# Patient Record
Sex: MALE | Race: WHITE | Employment: UNEMPLOYED | URBAN - METROPOLITAN AREA
[De-identification: names, ages, dates, MRNs, and addresses within clinical notes are randomized per-mention and may not be internally consistent; named-entity substitution may affect disease eponyms.]

---

## 2017-01-28 DIAGNOSIS — E78.5 HYPERLIPIDEMIA LDL GOAL <70: Primary | ICD-10-CM

## 2017-01-28 NOTE — TELEPHONE ENCOUNTER
Atorvastatin    80  Last Written Prescription Date: 04/04/16  Last Fill Quantity: 90, # refills: 2  Last Office Visit with [facility], [facility] or [facility] prescribing provider: 06/11/15   Next 5 appointments (look out 90 days)     Feb 14, 2017  3:30 PM   Return Visit with Seth Gurrola MD   [facility] PHYSICIAN HEART AT [facility] ([facility])    [address] [address]   [phone]                   CHOL      173   12/1/2014  HDL       42   12/1/2014  LDL      100   12/1/2014  TRIG      155   12/1/2014  CHOLHDLRATIO      4.1   12/1/2014      Thank You!  Amy Benavidez  [facility]  P: [phone] F:[phone]

## 2017-01-31 RX ORDER — ATORVASTATIN CALCIUM 80 MG/1
80 TABLET, FILM COATED ORAL DAILY
Qty: 90 TABLET | Refills: 0 | Status: SHIPPED | OUTPATIENT
Start: 2017-01-31 | End: 2017-05-01

## 2017-02-28 DIAGNOSIS — I47.10 PAROXYSMAL SUPRAVENTRICULAR TACHYCARDIA (H): ICD-10-CM

## 2017-02-28 NOTE — TELEPHONE ENCOUNTER
Metoprolol er 25mg      Last Written Prescription Date: 5/27/16  Last Fill Quantity: 90, # refills: 2    Last Office Visit with [facility] or  Health prescribing provider:  6/11/15   Future Office Visit:    Next 5 appointments (look out 90 days)     Mar 13, 2017  3:00 PM CDT   Return Visit with Alvaro Caballero MD   [facility] PHYSICIAN HEART AT [facility] ([facility])    [address] [place] [address]   [phone]                    BP Readings from Last 3 Encounters:   12/08/16 110/68   12/10/15 125/85   11/09/15 115/63       Aspirin 81mg chew      Last Written Prescription Date: 12/21/16  Last Fill Quantity: 90,  # refills: 0   Last Office Visit with [facility], [facility] or  Health prescribing provider: 6/11/15                                         Next 5 appointments (look out 90 days)     Mar 13, 2017  3:00 PM CDT   Return Visit with Alvaro Caballero MD   [facility] PHYSICIAN HEART AT [facility] ([facility])    [address] [place] [address]   [phone]                    Maria Elena Duncan [facility]

## 2017-03-01 RX ORDER — ASPIRIN 81 MG/1
81 TABLET, CHEWABLE ORAL DAILY
Qty: 30 TABLET | Refills: 0 | Status: SHIPPED | OUTPATIENT
Start: 2017-03-01 | End: 2017-03-31

## 2017-03-01 RX ORDER — METOPROLOL SUCCINATE 25 MG/1
25 TABLET, EXTENDED RELEASE ORAL DAILY
Qty: 30 TABLET | Refills: 0 | Status: SHIPPED | OUTPATIENT
Start: 2017-03-01 | End: 2017-03-31

## 2017-03-13 ENCOUNTER — OFFICE VISIT (OUTPATIENT)
Dept: CARDIOLOGY | Facility: CLINIC | Age: 62
End: 2017-03-13
Attending: INTERNAL MEDICINE
Payer: COMMERCIAL

## 2017-03-13 VITALS
BODY MASS INDEX: 26.96 KG/M2 | HEART RATE: 76 BPM | DIASTOLIC BLOOD PRESSURE: 57 MMHG | OXYGEN SATURATION: 96 % | WEIGHT: 162 LBS | SYSTOLIC BLOOD PRESSURE: 104 MMHG

## 2017-03-13 DIAGNOSIS — E78.2 MIXED HYPERLIPIDEMIA: ICD-10-CM

## 2017-03-13 DIAGNOSIS — I25.10 CORONARY ARTERY DISEASE INVOLVING NATIVE HEART WITHOUT ANGINA PECTORIS, UNSPECIFIED VESSEL OR LESION TYPE: Primary | ICD-10-CM

## 2017-03-13 LAB
ALBUMIN SERPL-MCNC: 3.6 G/DL (ref 3.4–5)
ALP SERPL-CCNC: 57 U/L (ref 40–150)
ALT SERPL W P-5'-P-CCNC: 91 U/L (ref 0–70)
ANION GAP SERPL CALCULATED.3IONS-SCNC: 4 MMOL/L (ref 3–14)
AST SERPL W P-5'-P-CCNC: 43 U/L (ref 0–45)
BILIRUB SERPL-MCNC: 0.8 MG/DL (ref 0.2–1.3)
BUN SERPL-MCNC: 20 MG/DL (ref 7–30)
CALCIUM SERPL-MCNC: 8.4 MG/DL (ref 8.5–10.1)
CHLORIDE SERPL-SCNC: 103 MMOL/L (ref 94–109)
CHOLEST SERPL-MCNC: 141 MG/DL
CO2 SERPL-SCNC: 30 MMOL/L (ref 20–32)
CREAT SERPL-MCNC: 0.84 MG/DL (ref 0.66–1.25)
ERYTHROCYTE [DISTWIDTH] IN BLOOD BY AUTOMATED COUNT: 13.2 % (ref 10–15)
GFR SERPL CREATININE-BSD FRML MDRD: ABNORMAL ML/MIN/1.7M2
GLUCOSE SERPL-MCNC: 91 MG/DL (ref 70–99)
HBA1C MFR BLD: 5.8 % (ref 4.3–6)
HCT VFR BLD AUTO: 44.4 % (ref 40–53)
HDLC SERPL-MCNC: 44 MG/DL
HGB BLD-MCNC: 15.1 G/DL (ref 13.3–17.7)
LDLC SERPL CALC-MCNC: 64 MG/DL
MCH RBC QN AUTO: 27.8 PG (ref 26.5–33)
MCHC RBC AUTO-ENTMCNC: 34 G/DL (ref 31.5–36.5)
MCV RBC AUTO: 82 FL (ref 78–100)
NONHDLC SERPL-MCNC: 97 MG/DL
PLATELET # BLD AUTO: 213 10E9/L (ref 150–450)
POTASSIUM SERPL-SCNC: 4.7 MMOL/L (ref 3.4–5.3)
PROT SERPL-MCNC: 6.9 G/DL (ref 6.8–8.8)
RBC # BLD AUTO: 5.43 10E12/L (ref 4.4–5.9)
SODIUM SERPL-SCNC: 137 MMOL/L (ref 133–144)
TRIGL SERPL-MCNC: 164 MG/DL
WBC # BLD AUTO: 7 10E9/L (ref 4–11)

## 2017-03-13 PROCEDURE — 80053 COMPREHEN METABOLIC PANEL: CPT | Performed by: INTERNAL MEDICINE

## 2017-03-13 PROCEDURE — 99214 OFFICE O/P EST MOD 30 MIN: CPT | Performed by: INTERNAL MEDICINE

## 2017-03-13 PROCEDURE — 85027 COMPLETE CBC AUTOMATED: CPT | Performed by: INTERNAL MEDICINE

## 2017-03-13 PROCEDURE — 83036 HEMOGLOBIN GLYCOSYLATED A1C: CPT | Performed by: INTERNAL MEDICINE

## 2017-03-13 PROCEDURE — 36415 COLL VENOUS BLD VENIPUNCTURE: CPT | Performed by: INTERNAL MEDICINE

## 2017-03-13 PROCEDURE — 80061 LIPID PANEL: CPT | Performed by: INTERNAL MEDICINE

## 2017-03-13 NOTE — PROGRESS NOTES
CARDIOLOGY VISIT    REASON FOR VISIT: f/u CAD, SVT    SUBJECTIVE:  61-year-old male seen for follow-up of SVT and coronary artery disease.  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  He had a postop inferior STEMI with subsequent drug-eluting stent to the RCA and then staged intervention of the OM 2.  He was also noted to have atrial tachycardia originating near the his node and no ablation was pursued.  He has been on beta blocker therapy since then.     Echocardiogram April 2014 showed ejection fraction 50-55% with mild inferior and inferolateral hypokinesis, normal right ventricle, no significant valve disease. Exercise nuclear stress test November 2015 showed 12 minutes 30 seconds on the Roderick protocol, 13 mets, 99% maximum heart rate achieved, no chest pain, small fixed inferior wall defect  consistent with infarct, no ischemia, ejection fraction 51% with mild inferior hypokinesis.     Since last visit he has been doing well.  He has rare palpitations lasting a few seconds, but nothing sustained.  He will work out on an elliptical machine for 20-30 minutes, peak heart rate in the 130s.  He denies any exertional chest pain or shortness of breath.  He thinks resting heart rate runs in the 60s to 70s.    MEDICATIONS:  Current Outpatient Prescriptions   Medication     metoprolol (TOPROL-XL) 25 MG 24 hr tablet     aspirin 81 MG chewable tablet     atorvastatin (LIPITOR) 80 MG tablet     acetaminophen (TYLENOL) 325 MG tablet     nitroglycerin (NITROSTAT) 0.4 MG SL tablet     No current facility-administered medications for this visit.        ALLERGIES:  No Known Allergies    REVIEW OF SYSTEMS:  Constitutional:  No weight loss, fever, chills, weakness or fatigue.  HEENT:  Eyes:  No visual loss, blurred vision, double vision or yellow sclerae. No hearing loss, sneezing, congestion, runny nose or sore throat.  Skin:  No rash or itching.  Cardiovascular: per HPI  Respiratory: per HPI  GI:   No anorexia, nausea, vomiting or diarrhea. No abdominal pain or blood.  :  No dysurea, hematuria  Neurologic:  No headache, dizziness, syncope, paralysis, ataxia, numbness or tingling in the extremities. No change in bowel or bladder control.  Musculoskeletal:  No muscle, back pain, joint pain or stiffness.  Hematologic:  No anemia, bleeding or bruising.  Lymphatics:  No enlarged nodes. No history of splenectomy.  Psychiatric:  No history of depression or anxiety.  Endocrine:  No reports of sweating, cold or heat intolerance. No polyuria or polydipsia.  Allergies:  No history of asthma, hives, eczema or rhinitis.    PHYSICAL EXAM:      BP: 104/57 Pulse: 76     SpO2: 96 %      Vital Signs with Ranges  Pulse:  [76] 76  BP: (104)/(57) 104/57  SpO2:  [96 %] 96 %  162 lbs 0 oz    Constitutional: awake, alert, no distress  Eyes: PERRL, sclera nonicteric  ENT: trachea midline  Respiratory: Lungs clear bilaterally  Cardiovascular: Regular rate and rhythm, no murmurs  GI: nondistended, nontender, bowel sounds present  Lymph/Hematologic: no lymphadenopathy  Skin: dry, no rash  Musculoskeletal: good muscle tone, strength 5/5 in upper and lower extremities  Neurologic: no focal deficits  Neuropsychiatric: appropriate affact    ASSESSMENT:  61-year-old male seen for follow-up of SVT and coronary artery disease.  Lately he is doing well and has no concerning symptoms with a very good functional capacity.  He is due for lab work, no labs have been done in the past one year or more.  No cardiac testing otherwise is indicated.    RECOMMENDATIONS:  1.  Coronary artery disease with previous stents  - Continue current medications    2.  History of SVT ablation  - No symptomatically recurrence, he only has very brief rare palpitations    3.  Dyslipidemia  - Recheck lipids today    Patient is also due for CMP, CBC, and A1c.    Follow-up in 6 months with physician assistant.    Alvaro Caballero MD  Cardiology - [facility]  Pager:   [phone]  Text Page  March 13, 2017

## 2017-03-13 NOTE — MR AVS SNAPSHOT
After Visit Summary   3/13/2017    Jacques Sharp    MRN: [id number]           Patient Information     Date Of Birth          1955        Visit Information        Provider Department      3/13/2017 3:00 PM Alvaro Caballero MD [facility] PHYSICIAN HEART AT [facility]        Today's Diagnoses     Coronary artery disease involving native heart without angina pectoris, unspecified vessel or lesion type    -  1    Mixed hyperlipidemia           Follow-ups after your visit        Additional Services     Follow-Up with Cardiac Advanced Practice Provider                 Your next 10 appointments already scheduled     Mar 13, 2017  3:00 PM CDT   Return Visit with Alvaro Caballero MD   [facility] PHYSICIAN HEART AT [facility] ([facility])    [address] [address] [phone]              Future tests that were ordered for you today     Open Future Orders        Priority Expected Expires Ordered    Follow-Up with Cardiac Advanced Practice Provider Routine 9/9/2017 3/13/2018 3/13/2017            Who to contact     If you have questions or need follow up information about today's clinic visit or your schedule please contact [facility] PHYSICIAN HEART [facility] directly at [phone].  Normal or non-critical lab and imaging results will be communicated to you by MyChart, letter or phone within 4 business days after the clinic has received the results. If you do not hear from us within 7 days, please contact the clinic through MyChart or phone. If you have a critical or abnormal lab result, we will notify you by phone as soon as possible.  Submit refill requests through [employer] or call your pharmacy and they will forward the refill request to us. Please allow 3 business days for your refill to be completed.          Additional Information About Your Visit        MyChart Information     [employer] lets you  "send messages to your doctor, view your test results, renew your prescriptions, schedule appointments and more. To sign up, go to [email or web address] . Click on \"Log in\" on the left side of the screen, which will take you to the Welcome page. Then click on \"Sign up Now\" on the right side of the page.     You will be asked to enter the access code listed below, as well as some personal information. Please follow the directions to create your username and password.     Your access code is: [email or web address]  Expires: 2017 11:57 AM     Your access code will  in 90 days. If you need help or a new code, please call your [facility] clinic or [phone].        Care EveryWhere ID     This is your Care EveryWhere ID. This could be used by other organizations to access your [facility] medical records  [id number]        Your Vitals Were     Pulse Pulse Oximetry BMI (Body Mass Index)             76 96% 26.96 kg/m2          Blood Pressure from Last 3 Encounters:   17 104/57   16 110/68   12/10/15 125/85    Weight from Last 3 Encounters:   17 73.5 kg (162 lb)   16 68 kg (150 lb)   12/10/15 68 kg (150 lb)              We Performed the Following     CBC with platelets     Comprehensive metabolic panel     Follow-Up with Cardiologist     Hemoglobin A1c     Lipid Profile          Today's Medication Changes          These changes are accurate as of: 3/13/17 11:57 AM.  If you have any questions, ask your nurse or doctor.               Stop taking these medicines if you haven't already. Please contact your care team if you have questions.     IBUPROFEN PO   Stopped by:  Alvaro Caballero MD                    Primary Care Provider Office Phone # Fax #    Luis Alberto Mcgowan [phone]       [facility] [address] [place] [address]        Thank you!     Thank you for choosing [facility] PHYSICIAN HEART AT [facility]  for your care. Our goal " is always to provide you with excellent care. Hearing back from our patients is one way we can continue to improve our services. Please take a few minutes to complete the written survey that you may receive in the mail after your visit with us. Thank you!             Your Updated Medication List - Protect others around you: Learn how to safely use, store and throw away your medicines at www.disposemymeds.org.          This list is accurate as of: 3/13/17 11:57 AM.  Always use your most recent med list.                   Brand Name Dispense Instructions for use    acetaminophen 325 MG tablet    TYLENOL    100 tablet    Take 1-2 tablets (325-650 mg) by mouth every 4 hours as needed       aspirin 81 MG chewable tablet     30 tablet    Take 1 tablet (81 mg) by mouth daily No further refills until seen by cardiology       atorvastatin 80 MG tablet    LIPITOR    90 tablet    Take 1 tablet (80 mg) by mouth daily       metoprolol 25 MG 24 hr tablet    TOPROL-XL    30 tablet    Take 1 tablet (25 mg) by mouth daily       nitroglycerin 0.4 MG sublingual tablet    NITROSTAT    60 tablet    Place 1 tablet (0.4 mg) under the tongue every 5 minutes as needed for chest pain

## 2017-03-13 NOTE — LETTER
3/13/2017    Luis Alberto Mcgowan MD  [facility]   [address] [address]    RE: Jacques ARNOLD Sharp       Dear Colleague,    I had the pleasure of seeing Jacques BARRETT Aron in the [facility].    CARDIOLOGY VISIT    REASON FOR VISIT: f/u CAD, SVT    SUBJECTIVE:  61-year-old male seen for follow-up of SVT and coronary artery disease.  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  He had a postop inferior STEMI with subsequent drug-eluting stent to the RCA and then staged intervention of the OM 2.  He was also noted to have atrial tachycardia originating near the his node and no ablation was pursued.  He has been on beta blocker therapy since then.     Echocardiogram April 2014 showed ejection fraction 50-55% with mild inferior and inferolateral hypokinesis, normal right ventricle, no significant valve disease. Exercise nuclear stress test November 2015 showed 12 minutes 30 seconds on the Roderick protocol, 13 mets, 99% maximum heart rate achieved, no chest pain, small fixed inferior wall defect  consistent with infarct, no ischemia, ejection fraction 51% with mild inferior hypokinesis.     Since last visit he has been doing well.  He has rare palpitations lasting a few seconds, but nothing sustained.  He will work out on an elliptical machine for 20-30 minutes, peak heart rate in the 130s.  He denies any exertional chest pain or shortness of breath.  He thinks resting heart rate runs in the 60s to 70s.    MEDICATIONS:  Current Outpatient Prescriptions   Medication     metoprolol (TOPROL-XL) 25 MG 24 hr tablet     aspirin 81 MG chewable tablet     atorvastatin (LIPITOR) 80 MG tablet     acetaminophen (TYLENOL) 325 MG tablet     nitroglycerin (NITROSTAT) 0.4 MG SL tablet     No current facility-administered medications for this visit.        ALLERGIES:  No Known Allergies    REVIEW OF SYSTEMS:  Constitutional:  No weight loss, fever, chills, weakness or  fatigue.  HEENT:  Eyes:  No visual loss, blurred vision, double vision or yellow sclerae. No hearing loss, sneezing, congestion, runny nose or sore throat.  Skin:  No rash or itching.  Cardiovascular: per HPI  Respiratory: per HPI  GI:  No anorexia, nausea, vomiting or diarrhea. No abdominal pain or blood.  :  No dysurea, hematuria  Neurologic:  No headache, dizziness, syncope, paralysis, ataxia, numbness or tingling in the extremities. No change in bowel or bladder control.  Musculoskeletal:  No muscle, back pain, joint pain or stiffness.  Hematologic:  No anemia, bleeding or bruising.  Lymphatics:  No enlarged nodes. No history of splenectomy.  Psychiatric:  No history of depression or anxiety.  Endocrine:  No reports of sweating, cold or heat intolerance. No polyuria or polydipsia.  Allergies:  No history of asthma, hives, eczema or rhinitis.    PHYSICAL EXAM:      BP: 104/57 Pulse: 76     SpO2: 96 %      Vital Signs with Ranges  Pulse:  [76] 76  BP: (104)/(57) 104/57  SpO2:  [96 %] 96 %  162 lbs 0 oz    Constitutional: awake, alert, no distress  Eyes: PERRL, sclera nonicteric  ENT: trachea midline  Respiratory: Lungs clear bilaterally  Cardiovascular: Regular rate and rhythm, no murmurs  GI: nondistended, nontender, bowel sounds present  Lymph/Hematologic: no lymphadenopathy  Skin: dry, no rash  Musculoskeletal: good muscle tone, strength 5/5 in upper and lower extremities  Neurologic: no focal deficits  Neuropsychiatric: appropriate affact    ASSESSMENT:  61-year-old male seen for follow-up of SVT and coronary artery disease.  Lately he is doing well and has no concerning symptoms with a very good functional capacity.  He is due for lab work, no labs have been done in the past one year or more.  No cardiac testing otherwise is indicated.    RECOMMENDATIONS:  1.  Coronary artery disease with previous stents  - Continue current medications    2.  History of SVT ablation  - No symptomatically recurrence, he only  has very brief rare palpitations    3.  Dyslipidemia  - Recheck lipids today    Patient is also due for CMP, CBC, and A1c.    Follow-up in 6 months with physician assistant.    Alvaro Caballero MD  Cardiology - [facility]  Pager:  [phone]  Text Page  March 13, 2017    Thank you for allowing me to participate in the care of your patient.    Sincerely,     Alvaro Caballero MD     [facility]

## 2017-03-31 DIAGNOSIS — I47.10 PAROXYSMAL SUPRAVENTRICULAR TACHYCARDIA (H): ICD-10-CM

## 2017-03-31 RX ORDER — ASPIRIN 81 MG/1
81 TABLET, CHEWABLE ORAL DAILY
Qty: 90 TABLET | Refills: 3 | Status: SHIPPED | OUTPATIENT
Start: 2017-03-31 | End: 2018-02-08

## 2017-03-31 RX ORDER — ASPIRIN 81 MG/1
81 TABLET, CHEWABLE ORAL DAILY
Qty: 30 TABLET | Refills: 0 | Status: SHIPPED | OUTPATIENT
Start: 2017-03-31 | End: 2017-03-31

## 2017-03-31 RX ORDER — METOPROLOL SUCCINATE 25 MG/1
25 TABLET, EXTENDED RELEASE ORAL DAILY
Qty: 30 TABLET | Refills: 0 | Status: SHIPPED | OUTPATIENT
Start: 2017-03-31 | End: 2017-03-31

## 2017-03-31 RX ORDER — METOPROLOL SUCCINATE 25 MG/1
25 TABLET, EXTENDED RELEASE ORAL DAILY
Qty: 90 TABLET | Refills: 3 | Status: SHIPPED | OUTPATIENT
Start: 2017-03-31 | End: 2018-01-11

## 2017-03-31 NOTE — TELEPHONE ENCOUNTER
Aspirin 81mg chew      Last Written Prescription Date:  3/1/17  Last Fill Quantity: 30,   # refills: 0  Last Office Visit with [facility], [facility] or [facility] prescribing provider: 3/13/17  Future Office visit:       Routing refill request to provider for review/approval because:  Drug not on the [facility], [facility] or [facility] refill protocol or controlled substance    Metoprolol Succ ER 25mg       Last Written Prescription Date:  3/1/17  Last Fill Quantity: 30,   # refills: 0  Last Office Visit with [facility], [facility] or [facility] prescribing provider: 3/13/17  Future Office visit:       Routing refill request to provider for review/approval because:  Drug not on the [facility], [facility] or [facility] refill protocol or controlled substance    Thank you!  Sissy Alvarado   [facility]  P: [phone] F: [phone]

## 2017-05-01 DIAGNOSIS — E78.5 HYPERLIPIDEMIA LDL GOAL <70: ICD-10-CM

## 2017-05-01 RX ORDER — ATORVASTATIN CALCIUM 80 MG/1
80 TABLET, FILM COATED ORAL DAILY
Qty: 90 TABLET | Refills: 0 | Status: SHIPPED | OUTPATIENT
Start: 2017-05-01 | End: 2017-07-31

## 2017-05-01 NOTE — TELEPHONE ENCOUNTER
Atorvastatin 80mg      Last Written Prescription Date: 1/31/17  Last Fill Quantity: 90, # refills: 0  Last Office Visit with [facility], [facility] or [facility] prescribing provider: 6/11/15       Lab Results   Component Value Date    CHOL 141 03/13/2017     Lab Results   Component Value Date    HDL 44 03/13/2017     Lab Results   Component Value Date    LDL 64 03/13/2017     Lab Results   Component Value Date    TRIG 164 03/13/2017     Lab Results   Component Value Date    CHOLHDLRATIO 4.1 12/01/2014       Thank you!  Sissy Alvarado   [facility]  P: [phone] F: [phone]

## 2017-05-02 ENCOUNTER — RADIANT APPOINTMENT (OUTPATIENT)
Dept: GENERAL RADIOLOGY | Facility: CLINIC | Age: 62
End: 2017-05-02
Attending: FAMILY MEDICINE
Payer: COMMERCIAL

## 2017-05-02 ENCOUNTER — OFFICE VISIT (OUTPATIENT)
Dept: ORTHOPEDICS | Facility: CLINIC | Age: 62
End: 2017-05-02
Payer: COMMERCIAL

## 2017-05-02 VITALS
BODY MASS INDEX: 26.99 KG/M2 | HEIGHT: 65 IN | DIASTOLIC BLOOD PRESSURE: 65 MMHG | SYSTOLIC BLOOD PRESSURE: 122 MMHG | WEIGHT: 162 LBS

## 2017-05-02 DIAGNOSIS — M25.552 LEFT HIP PAIN: ICD-10-CM

## 2017-05-02 DIAGNOSIS — M25.552 LEFT HIP PAIN: Primary | ICD-10-CM

## 2017-05-02 DIAGNOSIS — M16.12 PRIMARY OSTEOARTHRITIS OF LEFT HIP: ICD-10-CM

## 2017-05-02 PROCEDURE — 99203 OFFICE O/P NEW LOW 30 MIN: CPT | Performed by: FAMILY MEDICINE

## 2017-05-02 PROCEDURE — 73502 X-RAY EXAM HIP UNI 2-3 VIEWS: CPT

## 2017-05-02 NOTE — PROGRESS NOTES
"Jacques Sharp  :  1955  DOS: 2017  MRN: [id number]    Sports Medicine Clinic Visit    PCP: Luis Alberto Mcgowan    Jacques Sharp is a 62 year old male who is seen as a self referral presenting with left hip pain.    Injury: Gradual onset of left hip, deep buttocks pain over last 5 - 6 months.  Pain located over left deep posterior hip, radiating to posterior thigh.  Reports intermittent radiating, pain to left posterior, lateral thigh.  Additional Features:  Positive: popping, grinding and weakness.  Symptoms are better with: Rest.  Symptoms are worse with: tying shoes, lying on left side, prolonged sitting/walking, hip IR.  Other evaluation and/or treatments so far consists of: Tylenol and Rest.  Recent imaging completed: No recent imaging completed.  Prior History of related problems: none, h/o intermittent bilateral low back pain with sciatica that has not required intervention in past.    Social History: retired    Review of Systems  Musculoskeletal: as above  Remainder of review of systems is negative including constitutional, CV, pulmonary, GI, Skin and Neurologic except as noted in HPI or medical history.    Past Medical History:   Diagnosis Date     Acute MI (H) 2013     Coronary artery disease      Hyperlipidaemia      Hyperlipidaemia      Paroxysmal supraventricular tachycardia (H)      Past Surgical History:   Procedure Laterality Date     CORONARY ANGIOGRAPHY ADULT ORDER  14, 13     EP ABLATION / EP STUDIES  13     H ABLATION SVT  13     LUNG SURGERY      benign cyst removed.      VASECTOMY         Objective  /65  Ht 5' 5\" (1.651 m)  Wt 162 lb (73.5 kg)  BMI 26.96 kg/m2    General: healthy, alert and in no distress    HEENT: no scleral icterus or conjunctival erythema   Skin: no suspicious lesions or rash. No jaundice.   CV: regular rhythm by palpation, 2+ distal pulses, no pedal edema    Resp: normal respiratory effort without conversational dyspnea   Psych: " normal mood and affect    Gait: antalgic, appropriate coordination and balance   Neuro: normal light touch sensory exam of the extremities. Motor strength as noted below     Left hip exam    Inspection:        no edema or ecchymosis in hip area    ROM:       Flexion 110       internal rotation 15      external rotation 30      Range of motion limited by pain    Strength:        flexion 4/5       extension 4/5       abduction 3/5       adduction 4/5    Tender:        greater trochanter       Anterior hip joint       Mild ipsilateral SI joint TTP    Non Tender:        remainder of hip area       illiac crest       ASIS       pubis    Sensation:        grossly intact in hip and thigh    Skin:       well perfused       capillary refill brisk    Special Tests:        neg (-) PROSPER       positive (+) FADIR       positive (+) scour       neg (-) Leola      Radiology  PELVIS AND HIP LEFT ONE VIEW 5/2/2017 11:23 AM      HISTORY: Pain in left hip.     COMPARISON: None.         IMPRESSION: There are moderate degenerative changes of the left hip  with superior joint space narrowing. There is a large bump along the  femoral head and neck junction likely causing femoral acetabular  impingement. No fracture or dislocation.       Assessment:  1. Left hip pain    2. Primary osteoarthritis of left hip        Plan:  Discussed the assessment with the patient.  Follow up: 1 week for US guided CSI  Will have good diagnostic and hopefully therapeutic value  XR images independently visualized and reviewed with patient today in clinic  Moderate OA in hip  If CSI not helpful, can consider further ortho referral for TRISHA discussion  PT and HEP options reviewed  OTC pain medication use reviewed  Low impact activity strategies reviewed  Home handouts provided and supportive care reviewed  All questions were answered today  Contact us with additional questions or concerns  Signs and sx of concern reviewed      Jordan Milner DO, CAQ  Primary Care  Sports Medicine  [facility]               Disclaimer: This note consists of symbols derived from keyboarding, dictation and/or voice recognition software. As a result, there may be errors in the script that have gone undetected. Please consider this when interpreting information found in this chart.

## 2017-05-02 NOTE — NURSING NOTE
"Chief Complaint   Patient presents with     Musculoskeletal Problem     left hip pain > 6 months       Initial /65  Ht 5' 5\" (1.651 m)  Wt 162 lb (73.5 kg)  BMI 26.96 kg/m2 Estimated body mass index is 26.96 kg/(m^2) as calculated from the following:    Height as of this encounter: 5' 5\" (1.651 m).    Weight as of this encounter: 162 lb (73.5 kg).  Medication Reconciliation: complete     Rambo Adams, ATC  "

## 2017-05-11 ENCOUNTER — OFFICE VISIT (OUTPATIENT)
Dept: ORTHOPEDICS | Facility: CLINIC | Age: 62
End: 2017-05-11
Payer: COMMERCIAL

## 2017-05-11 VITALS
WEIGHT: 162 LBS | SYSTOLIC BLOOD PRESSURE: 155 MMHG | DIASTOLIC BLOOD PRESSURE: 72 MMHG | BODY MASS INDEX: 26.99 KG/M2 | HEIGHT: 65 IN

## 2017-05-11 DIAGNOSIS — M16.12 PRIMARY OSTEOARTHRITIS OF LEFT HIP: ICD-10-CM

## 2017-05-11 DIAGNOSIS — M25.552 LEFT HIP PAIN: Primary | ICD-10-CM

## 2017-05-11 PROCEDURE — 20611 DRAIN/INJ JOINT/BURSA W/US: CPT | Mod: LT | Performed by: FAMILY MEDICINE

## 2017-05-11 RX ORDER — TRIAMCINOLONE ACETONIDE 40 MG/ML
40 INJECTION, SUSPENSION INTRA-ARTICULAR; INTRAMUSCULAR ONCE
Qty: 1 ML | Refills: 0 | OUTPATIENT
Start: 2017-05-11 | End: 2017-05-11

## 2017-05-11 NOTE — PROGRESS NOTES
Jacques Sharp  :  1955  DOS: 2017  MRN: [id number]    Sports Medicine Clinic Procedure    Ultrasound Guided Left Intra-Articular Hip Injection    Clinical History: left hip and groin pain    Diagnosis:   1. Left hip pain    2. Primary osteoarthritis of left hip        Technique: The risks of the procedure were explained to the patient.  A consent was signed for the intra-articular hip injection.  The patient was evaluated with a [employer] ultrasound machine using a 12 MHz linear probe.     4 ml of 1% lidocaine was used for local anesthesia. The Left hip was prepped and draped in a sterile manner.  Ultrasound identification of the acetabulum, femoral head, and femoral neck in both long and short axis.  The location of the femoral vessels were noted and marked.  The probe was placed in a oblique-sagittal axis to the Left femoral neck.  A 3.5 inch 22 gauge needle was placed under ultrasound guidance into the anterior hip capsule.  A mixture of 2 ml's 1% lidocaine, 2 ml's 0.5% marcaine and 1 ml kenalog (40mg/ml) was injected without difficulty on oblique-sagittal axis view.  The needle was removed and there was good hemostasis without complications.  There was ultrasound documentation of needle placement and injection.  Pre-procedural pain 6/10.  Post procedural pain 2/10.    Impression:  Successful Left intra-articular hip injection.    Plan:  Follow up prn based on results  Call if no benefit in 2 weeks, and otherwise to report progress  Expectations and goals of CSI reviewed  Often 2-3 days for steroid effect, and can take up to two weeks for maximum effect  We discussed modified progressive pain-free activity as tolerated  Do not overuse in first two weeks if feeling better due to concern for vulnerability while steroid is working  Supportive care reviewed  All questions were answered today  Contact us with additional questions or concerns  Signs and sx of concern reviewed      Jordan Milner DO,  [place]  Primary Care Sports Medicine  [facility]

## 2017-05-11 NOTE — MR AVS SNAPSHOT
"              After Visit Summary   2017    Jacques Sharp    MRN: [id number]           Patient Information     Date Of Birth          1955        Visit Information        Provider Department      2017 8:00 AM Jordan Milner,  [facility]        Today's Diagnoses     Left hip pain    -  1    Primary osteoarthritis of left hip           Follow-ups after your visit        Who to contact     If you have questions or need follow up information about today's clinic visit or your schedule please contact [facility] directly at [phone].  Normal or non-critical lab and imaging results will be communicated to you by HemoSonicshart, letter or phone within 4 business days after the clinic has received the results. If you do not hear from us within 7 days, please contact the clinic through [employer]Sonicshart or phone. If you have a critical or abnormal lab result, we will notify you by phone as soon as possible.  Submit refill requests through Signic[employer] or call your pharmacy and they will forward the refill request to us. Please allow 3 business days for your refill to be completed.          Additional Information About Your Visit        MyChart Information     Signicast lets you send messages to your doctor, view your test results, renew your prescriptions, schedule appointments and more. To sign up, go to [email or web address] . Click on \"Log in\" on the left side of the screen, which will take you to the Welcome page. Then click on \"Sign up Now\" on the right side of the page.     You will be asked to enter the access code listed below, as well as some personal information. Please follow the directions to create your username and password.     Your access code is: [email or web address]  Expires: 2017 11:57 AM     Your access code will  in 90 days. If you need help or a new code, please call your [facility] clinic or [phone].        Care EveryWhere ID  " "   This is your Care EveryWhere ID. This could be used by other organizations to access your [facility] medical records  [id number]        Your Vitals Were     Height BMI (Body Mass Index)                5' 5\" (1.651 m) 26.96 kg/m2           Blood Pressure from Last 3 Encounters:   05/11/17 155/72   05/02/17 122/65   03/13/17 104/57    Weight from Last 3 Encounters:   05/11/17 162 lb (73.5 kg)   05/02/17 162 lb (73.5 kg)   03/13/17 162 lb (73.5 kg)              We Performed the Following     HC ARTHROCENTESIS ASPIR&/INJ MAJOR JT/BURSA W/US     TRIAMCINOLONE ACET INJ NOS          Today's Medication Changes          These changes are accurate as of: 5/11/17 11:59 PM.  If you have any questions, ask your nurse or doctor.               Start taking these medicines.        Dose/Directions    triamcinolone acetonide 40 MG/ML injection   Commonly known as:  KENALOG   Used for:  Left hip pain, Primary osteoarthritis of left hip        Dose:  40 mg   1 mL (40 mg) by INTRA-ARTICULAR route once for 1 dose   Quantity:  1 mL   Refills:  0            Where to get your medicines      Some of these will need a paper prescription and others can be bought over the counter.  Ask your nurse if you have questions.     You don't need a prescription for these medications     triamcinolone acetonide 40 MG/ML injection                Primary Care Provider Office Phone # Fax #    Luis Alberto Mcgowan [phone]       [address] [address]        Thank you!     Thank you for choosing [facility]  for your care. Our goal is always to provide you with excellent care. Hearing back from our patients is one way we can continue to improve our services. Please take a few minutes to complete the written survey that you may receive in the mail after your visit with us. Thank you!             Your Updated Medication List - Protect others around you: Learn how to " safely use, store and throw away your medicines at www.disposemymeds.org.          This list is accurate as of: 5/11/17 11:59 PM.  Always use your most recent med list.                   Brand Name Dispense Instructions for use    acetaminophen 325 MG tablet    TYLENOL    100 tablet    Take 1-2 tablets (325-650 mg) by mouth every 4 hours as needed       aspirin 81 MG chewable tablet     90 tablet    Take 1 tablet (81 mg) by mouth daily No further refills until seen by cardiology       atorvastatin 80 MG tablet    LIPITOR    90 tablet    Take 1 tablet (80 mg) by mouth daily       metoprolol 25 MG 24 hr tablet    TOPROL-XL    90 tablet    Take 1 tablet (25 mg) by mouth daily       nitroglycerin 0.4 MG sublingual tablet    NITROSTAT    60 tablet    Place 1 tablet (0.4 mg) under the tongue every 5 minutes as needed for chest pain       triamcinolone acetonide 40 MG/ML injection    KENALOG    1 mL    1 mL (40 mg) by INTRA-ARTICULAR route once for 1 dose

## 2017-07-31 DIAGNOSIS — E78.5 HYPERLIPIDEMIA LDL GOAL <70: ICD-10-CM

## 2017-07-31 NOTE — TELEPHONE ENCOUNTER
Atorvastatin 80mg      Last Written Prescription Date: 5/1/17  Last Fill Quantity: 90, # refills: 0  Last Office Visit with G, P or [facility] prescribing provider: 6/11/15       Lab Results   Component Value Date    CHOL 141 03/13/2017     Lab Results   Component Value Date    HDL 44 03/13/2017     Lab Results   Component Value Date    LDL 64 03/13/2017     Lab Results   Component Value Date    TRIG 164 03/13/2017     Lab Results   Component Value Date    CHOLHDLRATIO 4.1 12/01/2014       Thank you!  Sissy Alvarado   [facility]  P: [phone] F: [phone]

## 2017-08-01 RX ORDER — ATORVASTATIN CALCIUM 80 MG/1
80 TABLET, FILM COATED ORAL DAILY
Qty: 90 TABLET | Refills: 0 | Status: SHIPPED | OUTPATIENT
Start: 2017-08-01 | End: 2017-11-02

## 2017-08-23 ENCOUNTER — OFFICE VISIT (OUTPATIENT)
Dept: FAMILY MEDICINE | Facility: CLINIC | Age: 62
End: 2017-08-23
Payer: COMMERCIAL

## 2017-08-23 ENCOUNTER — RADIANT APPOINTMENT (OUTPATIENT)
Dept: GENERAL RADIOLOGY | Facility: CLINIC | Age: 62
End: 2017-08-23
Attending: NURSE PRACTITIONER
Payer: COMMERCIAL

## 2017-08-23 VITALS
SYSTOLIC BLOOD PRESSURE: 126 MMHG | TEMPERATURE: 98 F | DIASTOLIC BLOOD PRESSURE: 70 MMHG | BODY MASS INDEX: 26.16 KG/M2 | HEART RATE: 84 BPM | HEIGHT: 65 IN | WEIGHT: 157 LBS

## 2017-08-23 DIAGNOSIS — S46.912A SHOULDER STRAIN, LEFT, INITIAL ENCOUNTER: ICD-10-CM

## 2017-08-23 DIAGNOSIS — M25.512 ACUTE PAIN OF LEFT SHOULDER: ICD-10-CM

## 2017-08-23 DIAGNOSIS — Z01.818 PREOP GENERAL PHYSICAL EXAM: Primary | ICD-10-CM

## 2017-08-23 LAB
BASOPHILS # BLD AUTO: 0 10E9/L (ref 0–0.2)
BASOPHILS NFR BLD AUTO: 0.3 %
DIFFERENTIAL METHOD BLD: NORMAL
EOSINOPHIL # BLD AUTO: 0.1 10E9/L (ref 0–0.7)
EOSINOPHIL NFR BLD AUTO: 1.7 %
ERYTHROCYTE [DISTWIDTH] IN BLOOD BY AUTOMATED COUNT: 13.3 % (ref 10–15)
HCT VFR BLD AUTO: 44.1 % (ref 40–53)
HGB BLD-MCNC: 14.9 G/DL (ref 13.3–17.7)
LYMPHOCYTES # BLD AUTO: 1.1 10E9/L (ref 0.8–5.3)
LYMPHOCYTES NFR BLD AUTO: 14.5 %
MCH RBC QN AUTO: 27.2 PG (ref 26.5–33)
MCHC RBC AUTO-ENTMCNC: 33.8 G/DL (ref 31.5–36.5)
MCV RBC AUTO: 81 FL (ref 78–100)
MONOCYTES # BLD AUTO: 1.1 10E9/L (ref 0–1.3)
MONOCYTES NFR BLD AUTO: 13.9 %
NEUTROPHILS # BLD AUTO: 5.4 10E9/L (ref 1.6–8.3)
NEUTROPHILS NFR BLD AUTO: 69.6 %
PLATELET # BLD AUTO: 219 10E9/L (ref 150–450)
RBC # BLD AUTO: 5.47 10E12/L (ref 4.4–5.9)
WBC # BLD AUTO: 7.7 10E9/L (ref 4–11)

## 2017-08-23 PROCEDURE — 85025 COMPLETE CBC W/AUTO DIFF WBC: CPT | Performed by: NURSE PRACTITIONER

## 2017-08-23 PROCEDURE — 73030 X-RAY EXAM OF SHOULDER: CPT | Mod: LT

## 2017-08-23 PROCEDURE — 80048 BASIC METABOLIC PNL TOTAL CA: CPT | Performed by: NURSE PRACTITIONER

## 2017-08-23 PROCEDURE — 93000 ELECTROCARDIOGRAM COMPLETE: CPT | Performed by: NURSE PRACTITIONER

## 2017-08-23 PROCEDURE — 36415 COLL VENOUS BLD VENIPUNCTURE: CPT | Performed by: NURSE PRACTITIONER

## 2017-08-23 PROCEDURE — 99214 OFFICE O/P EST MOD 30 MIN: CPT | Performed by: NURSE PRACTITIONER

## 2017-08-23 RX ORDER — HYDROCODONE BITARTRATE AND ACETAMINOPHEN 5; 325 MG/1; MG/1
1 TABLET ORAL EVERY 6 HOURS PRN
Qty: 12 TABLET | Refills: 0 | Status: SHIPPED | OUTPATIENT
Start: 2017-08-23 | End: 2017-11-09

## 2017-08-23 NOTE — PROGRESS NOTES
"SUBJECTIVE:  Jacques Sharp is a 62 year old male  who is seen for  left shoulder injury that occurred  1 days ago.   Onset of injury: Following acute injury.  Mechanism of injury:  Fall.  Immediate symptoms: delayed pain, immediate swelling, was able to use arm directly after injury, no deformity was noted by the patient.  4/10  Relieving Factors:  Ice   Symptoms have been gradual since that time.  Prior history of related problems: no prior problems with this area in the past.    Past Medical History:   Diagnosis Date     Acute MI (H) 12/24/2013     Coronary artery disease      Hyperlipidaemia      Hyperlipidaemia      Paroxysmal supraventricular tachycardia (H)        Social History   Substance Use Topics     Smoking status: Former Smoker     Smokeless tobacco: Never Used      Comment: quit 30+ years ago     Alcohol use No         ROS:  CONSTITUTIONAL:NEGATIVE for fever, chills, change in weight  INTEGUMENTARY/SKIN: NEGATIVE for worrisome rashes, moles or lesions  EYES: NEGATIVE for vision changes or irritation  ENT/MOUTH: NEGATIVE for ear, mouth and throat problems  RESP:NEGATIVE for significant cough or SOB  CV: NEGATIVE for chest pain, palpitations or peripheral edema  MUSCULOSKELETAL: See above   NEURO: NEGATIVE for weakness, dizziness or paresthesias    OBJECTIVE:  Blood pressure 126/70, pulse 84, temperature 98  F (36.7  C), temperature source Tympanic, height 5' 5\" (1.651 m), weight 157 lb (71.2 kg).   GENERAL: healthy, alert and no distress  SKIN: no suspicious lesions or rashes  NEURO: Normal strength and tone, mentation intact and speech normal    Cardiovascular: negative, PMI normal. No lifts, heaves, or thrills. RRR. No murmurs, clicks gallops or rub  Respiratory: negative, Percussion normal. Good diaphragmatic excursion. Lungs clear    Inspection: no swelling, bruising, discoloration, or obvious deformity or asymmetry  Tender: proximal-mid clavicle, mid-distal clavicle, AC joint and " acromion  Non-tender: proximal bicep tendon, greater tuberosity, proximal humerus, supraspinatus , infraspinatus, upper trapezius muscle and rhomboids  Range of Motion  Active:forward flexion 80 degrees, abduction  80 degrees, external rotation  80 degrees, internal rotation  normal  Passive: all normal  Strength: rotator cuff strength full  Special tests:  Positive: Taisha      X-RAY INTERPRETATION  XR SHOULDER LT G/E 3 VW 8/23/2017 12:06 PM     HISTORY: Pain.     COMPARISON: None.         IMPRESSION: 4 views of the left shoulder show no significant osseous  or joint abnormality.      ARSH LAWRENCE MD    ASSESSMENT    ICD-10-CM    1. Preop general physical exam Z01.818 EKG 12-lead complete w/read - Clinics     CBC with platelets differential     Basic metabolic panel   2. Acute pain of left shoulder M25.512 XR Shoulder Left G/E 3 Views     OFFICE/OUTPT VISIT,EST,LEVL III   3. Shoulder strain, left, initial encounter S46.912A HYDROcodone-acetaminophen (NORCO) 5-325 MG per tablet     OFFICE/OUTPT VISIT,EST,LEVL III           PLAN:   Patient Instructions     Before Your Surgery      Call your surgeon if there is any change in your health. This includes signs of a cold or flu (such as a sore throat, runny nose, cough, rash or fever).    Do not smoke, drink alcohol or take over the counter medicine (unless your surgeon or primary care doctor tells you to) for the 24 hours before and after surgery.    If you take prescribed drugs: Follow your doctor s orders about which medicines to take and which to stop until after surgery.    Eating and drinking prior to surgery: follow the instructions from your surgeon    Take a shower or bath the night before surgery. Use the soap your surgeon gave you to gently clean your skin. If you do not have soap from your surgeon, use your regular soap. Do not shave or scrub the surgery site.  Wear clean pajamas and have clean sheets on your bed.       Shoulder Sprain  A sprain is a stretching  or tearing of the ligaments that hold a joint together. A sprain may take up to 8 weeks to fully heal, depending on how severe it is. Moderate to severe shoulder sprains are treated with a sling or shoulder immobilizer. Minor sprains can be treated without any special support.  Home care  The following guidelines will help you care for your injury at home:    If a sling was given to you, leave it in place for the time advised by your healthcare provider. If you aren t sure how long to wear it, ask for advice. If the sling becomes loose, adjust it so that your forearm is level with the ground. Your shoulder should feel well supported.    Put an ice pack on the injured area for 20 minutes every 1 to 2 hours the first day. You can make your own ice pack by putting ice cubes in a plastic bag. A bag of frozen peas or something similar works well too. Wrap the bag in a thin towel. Continue with ice packs 3 to 4 times a day for the next 2 to 3 days. Then use the pack as needed to ease pain and swelling.    You may use acetaminophen or ibuprofen to control pain, unless another pain medicine was prescribed. If you have chronic liver or kidney disease, talk with your healthcare provider before using these medicines. Also talk with your provider if you ve had a stomach ulcer or gastrointestinal bleeding.    Shoulder joints become stiff if left in a sling for too long. You should start range of motion exercises about 7 to 10 days after the injury. Talk with your provider to find out what type of exercises to do and how soon to start.  Follow-up care  Follow up with your healthcare provider, or as advised.  Any X-rays you had today don t show any broken bones, breaks, or fractures. Sometimes fractures don t show up on the first X-ray. Bruises and sprains can sometimes hurt as much as a fracture. These injuries can take time to heal completely. If your symptoms don t improve or they get worse, talk with your provider. You may  need a repeat X-ray or other treatments.  When to seek medical advice  Call your healthcare provider right away if any of these occur:    Shoulder pain or swelling in your arm that gets worse    Fingers become cold, blue, numb, or tingly    Large amount of bruising of the shoulder or upper arm    Fever or chills  Date Last Reviewed: 8/1/2016 2000-2017 [employer]. [address] [place] [address]. All rights reserved. This information is not intended as a substitute for professional medical care. Always follow your healthcare professional's instructions.                JOSE JUAN Velasquez CNP

## 2017-08-23 NOTE — PATIENT INSTRUCTIONS
Before Your Surgery      Call your surgeon if there is any change in your health. This includes signs of a cold or flu (such as a sore throat, runny nose, cough, rash or fever).    Do not smoke, drink alcohol or take over the counter medicine (unless your surgeon or primary care doctor tells you to) for the 24 hours before and after surgery.    If you take prescribed drugs: Follow your doctor s orders about which medicines to take and which to stop until after surgery.    Eating and drinking prior to surgery: follow the instructions from your surgeon    Take a shower or bath the night before surgery. Use the soap your surgeon gave you to gently clean your skin. If you do not have soap from your surgeon, use your regular soap. Do not shave or scrub the surgery site.  Wear clean pajamas and have clean sheets on your bed.       Shoulder Sprain  A sprain is a stretching or tearing of the ligaments that hold a joint together. A sprain may take up to 8 weeks to fully heal, depending on how severe it is. Moderate to severe shoulder sprains are treated with a sling or shoulder immobilizer. Minor sprains can be treated without any special support.  Home care  The following guidelines will help you care for your injury at home:    If a sling was given to you, leave it in place for the time advised by your healthcare provider. If you aren t sure how long to wear it, ask for advice. If the sling becomes loose, adjust it so that your forearm is level with the ground. Your shoulder should feel well supported.    Put an ice pack on the injured area for 20 minutes every 1 to 2 hours the first day. You can make your own ice pack by putting ice cubes in a plastic bag. A bag of frozen peas or something similar works well too. Wrap the bag in a thin towel. Continue with ice packs 3 to 4 times a day for the next 2 to 3 days. Then use the pack as needed to ease pain and swelling.    You may use acetaminophen or ibuprofen to control  pain, unless another pain medicine was prescribed. If you have chronic liver or kidney disease, talk with your healthcare provider before using these medicines. Also talk with your provider if you ve had a stomach ulcer or gastrointestinal bleeding.    Shoulder joints become stiff if left in a sling for too long. You should start range of motion exercises about 7 to 10 days after the injury. Talk with your provider to find out what type of exercises to do and how soon to start.  Follow-up care  Follow up with your healthcare provider, or as advised.  Any X-rays you had today don t show any broken bones, breaks, or fractures. Sometimes fractures don t show up on the first X-ray. Bruises and sprains can sometimes hurt as much as a fracture. These injuries can take time to heal completely. If your symptoms don t improve or they get worse, talk with your provider. You may need a repeat X-ray or other treatments.  When to seek medical advice  Call your healthcare provider right away if any of these occur:    Shoulder pain or swelling in your arm that gets worse    Fingers become cold, blue, numb, or tingly    Large amount of bruising of the shoulder or upper arm    Fever or chills  Date Last Reviewed: 8/1/2016 2000-2017 [employer]. [address] [place] [address], [place] [address]. All rights reserved. This information is not intended as a substitute for professional medical care. Always follow your healthcare professional's instructions.

## 2017-08-23 NOTE — MR AVS SNAPSHOT
After Visit Summary   8/23/2017    Jacques Sharp    MRN: [id number]           Patient Information     Date Of Birth          1955        Visit Information        Provider Department      8/23/2017 11:00 AM Barbara Mulligan APRN CNP [facility]        Today's Diagnoses     Preop general physical exam    -  1    Acute pain of left shoulder        Shoulder strain, left, initial encounter          Care Instructions      Before Your Surgery      Call your surgeon if there is any change in your health. This includes signs of a cold or flu (such as a sore throat, runny nose, cough, rash or fever).    Do not smoke, drink alcohol or take over the counter medicine (unless your surgeon or primary care doctor tells you to) for the 24 hours before and after surgery.    If you take prescribed drugs: Follow your doctor s orders about which medicines to take and which to stop until after surgery.    Eating and drinking prior to surgery: follow the instructions from your surgeon    Take a shower or bath the night before surgery. Use the soap your surgeon gave you to gently clean your skin. If you do not have soap from your surgeon, use your regular soap. Do not shave or scrub the surgery site.  Wear clean pajamas and have clean sheets on your bed.       Shoulder Sprain  A sprain is a stretching or tearing of the ligaments that hold a joint together. A sprain may take up to 8 weeks to fully heal, depending on how severe it is. Moderate to severe shoulder sprains are treated with a sling or shoulder immobilizer. Minor sprains can be treated without any special support.  Home care  The following guidelines will help you care for your injury at home:    If a sling was given to you, leave it in place for the time advised by your healthcare provider. If you aren t sure how long to wear it, ask for advice. If the sling becomes loose, adjust it so that your forearm is level with the ground. Your shoulder  should feel well supported.    Put an ice pack on the injured area for 20 minutes every 1 to 2 hours the first day. You can make your own ice pack by putting ice cubes in a plastic bag. A bag of frozen peas or something similar works well too. Wrap the bag in a thin towel. Continue with ice packs 3 to 4 times a day for the next 2 to 3 days. Then use the pack as needed to ease pain and swelling.    You may use acetaminophen or ibuprofen to control pain, unless another pain medicine was prescribed. If you have chronic liver or kidney disease, talk with your healthcare provider before using these medicines. Also talk with your provider if you ve had a stomach ulcer or gastrointestinal bleeding.    Shoulder joints become stiff if left in a sling for too long. You should start range of motion exercises about 7 to 10 days after the injury. Talk with your provider to find out what type of exercises to do and how soon to start.  Follow-up care  Follow up with your healthcare provider, or as advised.  Any X-rays you had today don t show any broken bones, breaks, or fractures. Sometimes fractures don t show up on the first X-ray. Bruises and sprains can sometimes hurt as much as a fracture. These injuries can take time to heal completely. If your symptoms don t improve or they get worse, talk with your provider. You may need a repeat X-ray or other treatments.  When to seek medical advice  Call your healthcare provider right away if any of these occur:    Shoulder pain or swelling in your arm that gets worse    Fingers become cold, blue, numb, or tingly    Large amount of bruising of the shoulder or upper arm    Fever or chills  Date Last Reviewed: 8/1/2016 2000-2017 [employer]. [address] [place] [address]. All rights reserved. This information is not intended as a substitute for professional medical care. Always follow your healthcare professional's instructions.                Follow-ups  "after your visit        Who to contact     If you have questions or need follow up information about today's clinic visit or your schedule please contact [facility] directly at [phone].  Normal or non-critical lab and imaging results will be communicated to you by MyChart, letter or phone within 4 business days after the clinic has received the results. If you do not hear from us within 7 days, please contact the clinic through MyChart or phone. If you have a critical or abnormal lab result, we will notify you by phone as soon as possible.  Submit refill requests through [employer] or call your pharmacy and they will forward the refill request to us. Please allow 3 business days for your refill to be completed.          Additional Information About Your Visit        [facility] Information     [employer] lets you send messages to your doctor, view your test results, renew your prescriptions, schedule appointments and more. To sign up, go to [email or web address] . Click on \"Log in\" on the left side of the screen, which will take you to the Welcome page. Then click on \"Sign up Now\" on the right side of the page.     You will be asked to enter the access code listed below, as well as some personal information. Please follow the directions to create your username and password.     Your access code is: [email or web address]  Expires: 2017 11:38 AM     Your access code will  in 90 days. If you need help or a new code, please call your [facility] clinic or [phone].        Care EveryWhere ID     This is your Care EveryWhere ID. This could be used by other organizations to access your [facility] medical records  [id number]        Your Vitals Were     Pulse Temperature Height BMI (Body Mass Index)          84 98  F (36.7  C) (Tympanic) 5' 5\" (1.651 m) 26.13 kg/m2         Blood Pressure from Last 3 Encounters:   17 126/70   17 155/72   17 122/65    Weight from Last 3 Encounters: "   08/23/17 157 lb (71.2 kg)   05/11/17 162 lb (73.5 kg)   05/02/17 162 lb (73.5 kg)              We Performed the Following     Basic metabolic panel     CBC with platelets differential     EKG 12-lead complete w/read - Clinics          Today's Medication Changes          These changes are accurate as of: 8/23/17 12:10 PM.  If you have any questions, ask your nurse or doctor.               Start taking these medicines.        Dose/Directions    HYDROcodone-acetaminophen 5-325 MG per tablet   Commonly known as:  NORCO   Used for:  Shoulder strain, left, initial encounter   Started by:  Barbara Mulligan APRN CNP        Dose:  1 tablet   Take 1 tablet by mouth every 6 hours as needed for moderate to severe pain   Quantity:  12 tablet   Refills:  0            Where to get your medicines      Some of these will need a paper prescription and others can be bought over the counter.  Ask your nurse if you have questions.     Bring a paper prescription for each of these medications     HYDROcodone-acetaminophen 5-325 MG per tablet                Primary Care Provider Office Phone # Fax #    Luis Alberto Mcgowan [phone] [address] [place], [place] [address]        Equal Access to Services     [facility] : Hadii javed sotoo Soruthy, waaxda luqadaha, qaybta kaalmada renzo, sudhakar astudillo . So [facility] [phone].    ATENCIÓN: Si habla español, tiene a nuñez disposición servicios gratuitos de asistencia lingüística. Llame al [phone].    We comply with applicable federal civil rights laws and [place] laws. We do not discriminate on the basis of race, color, national origin, age, disability sex, sexual orientation or gender identity.            Thank you!     Thank you for choosing [facility]  for your care. Our goal is always to provide you with excellent care. Hearing back from our patients is one way we can continue to improve our services. Please  take a few minutes to complete the written survey that you may receive in the mail after your visit with us. Thank you!             Your Updated Medication List - Protect others around you: Learn how to safely use, store and throw away your medicines at www.disposemymeds.org.          This list is accurate as of: 8/23/17 12:10 PM.  Always use your most recent med list.                   Brand Name Dispense Instructions for use Diagnosis    acetaminophen 325 MG tablet    TYLENOL    100 tablet    Take 1-2 tablets (325-650 mg) by mouth every 4 hours as needed    S/P coronary angiogram       aspirin 81 MG chewable tablet     90 tablet    Take 1 tablet (81 mg) by mouth daily No further refills until seen by cardiology    Paroxysmal supraventricular tachycardia (H)       atorvastatin 80 MG tablet    LIPITOR    90 tablet    Take 1 tablet (80 mg) by mouth daily    Hyperlipidemia LDL goal <70       HYDROcodone-acetaminophen 5-325 MG per tablet    NORCO    12 tablet    Take 1 tablet by mouth every 6 hours as needed for moderate to severe pain    Shoulder strain, left, initial encounter       metoprolol 25 MG 24 hr tablet    TOPROL-XL    90 tablet    Take 1 tablet (25 mg) by mouth daily    Paroxysmal supraventricular tachycardia (H)       nitroGLYcerin 0.4 MG sublingual tablet    NITROSTAT    60 tablet    Place 1 tablet (0.4 mg) under the tongue every 5 minutes as needed for chest pain    Acute MI (H)

## 2017-08-23 NOTE — PROGRESS NOTES
[facility]  [address] [place] [address]  [phone]  Dept: [phone]    PRE-OP EVALUATION:  Today's date: 2017    Jacques Sharp (: 1955) presents for pre-operative evaluation assessment as requested by Dr. Yost.  He requires evaluation and anesthesia risk assessment prior to undergoing surgery/procedure for treatment of left hip .  Proposed procedure: left hip replacement    Date of Surgery/ Procedure: 17  Time of Surgery/ Procedure: [facility]/Surgical Facility: [facility]  Fax number for surgical facility: [phone]  Primary Physician: Luis Alberto Mcgowan  Type of Anesthesia Anticipated: Spinal    Patient has a Health Care Directive or Living Will:  NO    1. YES, had stents placed in  - Do you have a history of heart attack, stroke, stent, bypass or surgery on an artery in the head, neck, heart or legs?  2. NO - Do you ever have any pain or discomfort in your chest?  3. NO - Do you have a history of  Heart Failure?  4. NO - Are you troubled by shortness of breath when: walking on the level, up a slight hill or at night?  5. NO - Do you currently have a cold, bronchitis or other respiratory infection?  6. NO - Do you have a cough, shortness of breath or wheezing?  7. NO - Do you sometimes get pains in the calves of your legs when you walk?  8. NO - Do you or anyone in your family have previous history of blood clots?  9. NO - Do you or does anyone in your family have a serious bleeding problem such as prolonged bleeding following surgeries or cuts?  10. NO - Have you ever had problems with anemia or been told to take iron pills?  11. NO - Have you had any abnormal blood loss such as black, tarry or bloody stools, or abnormal vaginal bleeding?  12. NO - Have you ever had a blood transfusion?  13. NO - Have you or any of your relatives ever had problems with anesthesia?  14. NO - Do you have sleep apnea, excessive snoring or daytime  drowsiness?  15. NO - Do you have any prosthetic heart valves?  16. NO - Do you have prosthetic joints?  17. NO - Is there any chance that you may be pregnant?        HPI:                                                      Brief HPI related to upcoming procedure: left hip replacement       See problem list for active medical problems.  Problems all longstanding and stable, except as noted/documented.  See ROS for pertinent symptoms related to these conditions.                                                                                                  .    MEDICAL HISTORY:                                                    Patient Active Problem List    Diagnosis Date Noted     Paroxysmal supraventricular tachycardia (H) 02/19/2014     Priority: Medium     12/26/13:SVT RFA performed. Two foci were noted during his EP study, however only one was ablated as the second site was very close to the HIS bundle.  Complicated by MI after the procedure.          Acute MI (H) 12/24/2013     Priority: Medium     12/26/13:admitted initially for SVT ablation. He was kept overnight due to recurrent SVT and subsequently suffered an IWMI. He was taken emergently to the cath lab where he underwent thrombectomy and stent placement of the RCA. He has residual disease of the Cx system in need of staged intervention in about 4 weeks  1/30/14:Another angiogram with another stent placed.        Hyperlipidemia LDL goal <70 02/26/2013     Priority: Medium      Past Medical History:   Diagnosis Date     Acute MI (H) 12/24/2013     Coronary artery disease      Hyperlipidaemia      Hyperlipidaemia      Paroxysmal supraventricular tachycardia (H)      Past Surgical History:   Procedure Laterality Date     CORONARY ANGIOGRAPHY ADULT ORDER  1/30/14, 12/24/13     EP ABLATION / EP STUDIES  12/23/13     H ABLATION SVT  12/23/13     LUNG SURGERY      benign cyst removed.      VASECTOMY       Current Outpatient Prescriptions   Medication Sig  "Dispense Refill     atorvastatin (LIPITOR) 80 MG tablet Take 1 tablet (80 mg) by mouth daily 90 tablet 0     aspirin 81 MG chewable tablet Take 1 tablet (81 mg) by mouth daily No further refills until seen by cardiology 90 tablet 3     metoprolol (TOPROL-XL) 25 MG 24 hr tablet Take 1 tablet (25 mg) by mouth daily 90 tablet 3     acetaminophen (TYLENOL) 325 MG tablet Take 1-2 tablets (325-650 mg) by mouth every 4 hours as needed 100 tablet      nitroglycerin (NITROSTAT) 0.4 MG SL tablet Place 1 tablet (0.4 mg) under the tongue every 5 minutes as needed for chest pain (Patient not taking: Reported on 8/23/2017) 60 tablet 3     OTC products: None, except as noted above    No Known Allergies   Latex Allergy: NO    Social History   Substance Use Topics     Smoking status: Former Smoker     Smokeless tobacco: Never Used      Comment: quit 30+ years ago     Alcohol use No     History   Drug Use No       REVIEW OF SYSTEMS:                                                    C: NEGATIVE for fever, chills, change in weight  I: NEGATIVE for worrisome rashes, moles or lesions  E: NEGATIVE for vision changes or irritation  E/M: NEGATIVE for ear, mouth and throat problems  R: NEGATIVE for significant cough or SOB  B: NEGATIVE for masses, tenderness or discharge  CV: NEGATIVE for chest pain, palpitations or peripheral edema  GI: NEGATIVE for nausea, abdominal pain, heartburn, or change in bowel habits  : NEGATIVE for frequency, dysuria, or hematuria  M: NEGATIVE for significant arthralgias or myalgia  N: NEGATIVE for weakness, dizziness or paresthesias  E: NEGATIVE for temperature intolerance, skin/hair changes  H: NEGATIVE for bleeding problems  P: NEGATIVE for changes in mood or affect    EXAM:                                                    /70 (BP Location: Right arm, Cuff Size: Adult Large)  Pulse 84  Temp 98  F (36.7  C) (Tympanic)  Ht 5' 5\" (1.651 m)  Wt 157 lb (71.2 kg)  BMI 26.13 kg/m2    GENERAL " APPEARANCE: healthy, alert and no distress     EYES: EOMI,  PERRL     HENT: ear canals and TM's normal and nose and mouth without ulcers or lesions     NECK: no adenopathy, no asymmetry, masses, or scars and thyroid normal to palpation     RESP: lungs clear to auscultation - no rales, rhonchi or wheezes     CV: regular rates and rhythm, normal S1 S2, no S3 or S4 and no murmur, click or rub     ABDOMEN:  soft, nontender, no HSM or masses and bowel sounds normal     MS: extremities normal- no gross deformities noted, no evidence of inflammation in joints, FROM in all extremities.     SKIN: no suspicious lesions or rashes     NEURO: Normal strength and tone, sensory exam grossly normal, mentation intact and speech normal     PSYCH: mentation appears normal. and affect normal/bright     LYMPHATICS: No axillary, cervical, or supraclavicular nodes    DIAGNOSTICS:                                                        ICD-10-CM    1. Preop general physical exam Z01.818 EKG 12-lead complete w/read - Clinics     CBC with platelets differential     Basic metabolic panel   2. Acute pain of left shoulder M25.512 XR Shoulder Left G/E 3 Views     OFFICE/OUTPT VISIT,EST,LEVL III   3. Shoulder strain, left, initial encounter S46.912A HYDROcodone-acetaminophen (NORCO) 5-325 MG per tablet     OFFICE/OUTPT VISIT,EST,LEVL III         Recent Labs   Lab Test  03/13/17   1141  01/30/14   0725   08/14/13 2000   HGB  15.1  13.3   < >  14.9   PLT  213  180   < >  260   INR   --   1.11   --   0.96   NA  137  139   < >  140   POTASSIUM  4.7  4.4   < >  4.2   CR  0.84  0.89   < >  1.18   A1C  5.8   --    --    --     < > = values in this interval not displayed.      Results for orders placed or performed in visit on 08/23/17   CBC with platelets differential   Result Value Ref Range    WBC 7.7 4.0 - 11.0 10e9/L    RBC Count 5.47 4.4 - 5.9 10e12/L    Hemoglobin 14.9 13.3 - 17.7 g/dL    Hematocrit 44.1 40.0 - 53.0 %    MCV 81 78 - 100 fl    MCH  27.2 26.5 - 33.0 pg    MCHC 33.8 31.5 - 36.5 g/dL    RDW 13.3 10.0 - 15.0 %    Platelet Count 219 150 - 450 10e9/L    Diff Method Automated Method     % Neutrophils 69.6 %    % Lymphocytes 14.5 %    % Monocytes 13.9 %    % Eosinophils 1.7 %    % Basophils 0.3 %    Absolute Neutrophil 5.4 1.6 - 8.3 10e9/L    Absolute Lymphocytes 1.1 0.8 - 5.3 10e9/L    Absolute Monocytes 1.1 0.0 - 1.3 10e9/L    Absolute Eosinophils 0.1 0.0 - 0.7 10e9/L    Absolute Basophils 0.0 0.0 - 0.2 10e9/L   Basic metabolic panel   Result Value Ref Range    Sodium 136 133 - 144 mmol/L    Potassium 4.1 3.4 - 5.3 mmol/L    Chloride 102 94 - 109 mmol/L    Carbon Dioxide 30 20 - 32 mmol/L    Anion Gap 4 3 - 14 mmol/L    Glucose 92 70 - 99 mg/dL    Urea Nitrogen 16 7 - 30 mg/dL    Creatinine 0.75 0.66 - 1.25 mg/dL    GFR Estimate >90 >60 mL/min/1.7m2    GFR Estimate If Black >90 >60 mL/min/1.7m2    Calcium 8.8 8.5 - 10.1 mg/dL     EKG: Sinus with Right Bundle Branch Block.  Unchanged from 2013.    IMPRESSION:                                                    Reason for surgery/procedure: hip replacement.     The proposed surgical procedure is considered INTERMEDIATE risk.    REVISED CARDIAC RISK INDEX  The patient has the following serious cardiovascular risks for perioperative complications such as (MI, PE, VFib and 3  AV Block):  No serious cardiac risks  INTERPRETATION: 1 risks: Class II (low risk - 0.9% complication rate) History of dent placement. No chest pain or nitroglycerin use since 2014.    The patient has the following additional risks for perioperative complications:  No identified additional risks      ICD-10-CM    1. Preop general physical exam Z01.818 EKG 12-lead complete w/read - Clinics     CBC with platelets differential     Basic metabolic panel       RECOMMENDATIONS:                                                      --Consult hospital rounder / IM to assist post-op medical management    --Patient is to take all scheduled  medications on the day of surgery EXCEPT for modifications listed below.    APPROVAL GIVEN to proceed with proposed procedure, without further diagnostic evaluation       Signed Electronically by: JOSE JUAN Velasquez CNP    Copy of this evaluation report is provided to requesting physician.    [facility] Preop Guidelines

## 2017-08-24 LAB
ANION GAP SERPL CALCULATED.3IONS-SCNC: 4 MMOL/L (ref 3–14)
BUN SERPL-MCNC: 16 MG/DL (ref 7–30)
CALCIUM SERPL-MCNC: 8.8 MG/DL (ref 8.5–10.1)
CHLORIDE SERPL-SCNC: 102 MMOL/L (ref 94–109)
CO2 SERPL-SCNC: 30 MMOL/L (ref 20–32)
CREAT SERPL-MCNC: 0.75 MG/DL (ref 0.66–1.25)
GFR SERPL CREATININE-BSD FRML MDRD: >90 ML/MIN/1.7M2
GLUCOSE SERPL-MCNC: 92 MG/DL (ref 70–99)
POTASSIUM SERPL-SCNC: 4.1 MMOL/L (ref 3.4–5.3)
SODIUM SERPL-SCNC: 136 MMOL/L (ref 133–144)

## 2017-09-11 ENCOUNTER — HOSPITAL ENCOUNTER (OUTPATIENT)
Dept: PHYSICAL THERAPY | Facility: CLINIC | Age: 62
Setting detail: THERAPIES SERIES
End: 2017-09-11
Attending: ORTHOPAEDIC SURGERY
Payer: COMMERCIAL

## 2017-09-11 PROCEDURE — 97161 PT EVAL LOW COMPLEX 20 MIN: CPT | Mod: GP | Performed by: PHYSICAL THERAPIST

## 2017-09-11 PROCEDURE — 40000718 ZZHC STATISTIC PT DEPARTMENT ORTHO VISIT: Performed by: PHYSICAL THERAPIST

## 2017-09-11 PROCEDURE — 97110 THERAPEUTIC EXERCISES: CPT | Mod: GP | Performed by: PHYSICAL THERAPIST

## 2017-09-11 NOTE — PROGRESS NOTES
09/11/17 1300   General Information   Type of Visit Initial OP Ortho PT Evaluation   Start of Care Date 09/11/17   Referring Physician Cal Quezada PA-C   Patient/Family Goals Statement To get back to hunting; get back as a /painting   Orders Evaluate and Treat   Date of Order 08/31/17   Insurance Type Blue Cross   Insurance Comments/Visits Authorized Blue Plus >40 visits requires pre auth   Medical Diagnosis Total hip joint replacement (left)   Surgical/Medical history reviewed Yes   Precautions/Limitations no known precautions/limitations       Present No   Body Part(s)   Body Part(s) Hip   Presentation and Etiology   Pertinent history of current problem (include personal factors and/or comorbidities that impact the POC) Patient reports: had left hip replacement on 8/29/17 due to arthritis.  Pain is well controlled with Tylenol.  Able to drive now.  Does report a history of low back pain in the past 2-3 years and heart attach ~4 years ago.    Impairments A. Pain;B. Decreased WB tolerance;D. Decreased ROM;H. Impaired gait   Functional Limitations perform activities of daily living;perform desired leisure / sports activities   Symptom Location Left lateral hip   How/Where did it occur From Degenerative Joint Disease   Onset date of current episode/exacerbation 08/29/17   Chronicity New   Pain rating (0-10 point scale) Best (/10);Worst (/10)   Best (/10) 2   Worst (/10) 5   Pain quality B. Dull;C. Aching   Frequency of pain/symptoms C. With activity   Pain/symptoms are: The same all the time   Pain/symptoms exacerbated by B. Walking;G. Certain positions   Pain/symptoms eased by H. Cold;C. Rest   Progression of symptoms since onset: Improved   Prior Level of Function   Prior Level of Function-Mobility Independent   Prior Level of Function-ADLs Independent   Current Level of Function   Current Community Support Family/friend caregiver   Patient role/employment history F. Retired    [facility]   Home/community accessibility No stairs at home   Current equipment-Gait/Locomotion Standard cane   Fall Risk Screen   Fall screen completed by PT   Per patient - Fall 2 or more times in past year? No   Per patient - Fall with injury in past year? No   Is patient a fall risk? No   Functional Scales   Functional Scales Other   Other Scales  LEFS   Hip Objective Findings   Side (if bilateral, select both right and left) Left   Integumentary  Surgical bandage still on; did not remove.  No weeping through bandage; no signs of infection   Gait/Locomotion With standard cane right UE   Balance/Proprioception (Single Leg Stance) Right=5 sec; Left=1 sec   Left Hip Flexion Strength Right=5/5; Left=4+/5   Left Hip Abduction Strength Right=4/5; Left=3+/5   Left Hip Extension Strength Right=5/5; Left=4/5   Left Knee Flexion Strength Right=5/5; Left=5-/5   Left Knee Extension Strength Deep=5/5   Planned Therapy Interventions   Planned Therapy Interventions ADL retraining;balance training;gait training;manual therapy;motor coordination training;neuromuscular re-education;ROM;strengthening;stretching   Planned Modality Interventions   Planned Modality Interventions Cryotherapy   Clinical Impression   Criteria for Skilled Therapeutic Interventions Met yes, treatment indicated   PT Diagnosis s/p left TRISHA 8/29/17   Influenced by the following impairments Pain; weakness; impaired gait; impaired ROM; impaired proprioception   Functional limitations due to impairments Pain and difficulty walking any distance, climbing stairs, sitting, and sleeping.  Unable to work at this time   Clinical Presentation Stable/Uncomplicated   Clinical Presentation Rationale (+) minimal pain currently; good health/no comorbidities; good baseline strength   Clinical Decision Making (Complexity) Low complexity   Therapy Frequency 2 times/Week  (2x/week for 3 weeks; 1x/week for 5 weeks)   Predicted Duration of Therapy  Intervention (days/wks) 8 weeks   Risk & Benefits of therapy have been explained Yes   Patient, Family & other staff in agreement with plan of care Yes   Clinical Impression Comments Jacques is a pleasant 63 yo male who presents today 2 weeks s/p left TRISHA (posterolateral approach) performed by Saw Chery MD.  He will benefit from skilled PT to address impairments and functional limitations.   Education Assessment   Preferred Learning Style Listening;Demonstration;Pictures/video   Barriers to Learning No barriers   ORTHO GOALS   PT Ortho Eval Goals 1;2;3   Ortho Goal 1   Goal Description Following PT interventions, patient will be able to walk >=2 blocks without an assistive device to allow for normalized community mobility.   Target Date 10/23/17   Ortho Goal 2   Goal Description Following PT interventions, patient will be able to walk in the woods for ~100 yards without difficulty to allow for pt to hunt.   Target Date 11/06/17   Ortho Goal 3   Goal Description Following PT interventions, patient will be independent with his HEP to reduce future occurrence of pain and disability.   Target Date 09/25/17   Total Evaluation Time   Total Evaluation Time 15 min       Nargis Covington PT, DPT  Doctor of Physical Therapy #[id number]  [facility]  [phone]  [email or web address]

## 2017-09-13 ENCOUNTER — HOSPITAL ENCOUNTER (OUTPATIENT)
Dept: PHYSICAL THERAPY | Facility: CLINIC | Age: 62
Setting detail: THERAPIES SERIES
End: 2017-09-13
Attending: ORTHOPAEDIC SURGERY
Payer: COMMERCIAL

## 2017-09-13 PROCEDURE — 97110 THERAPEUTIC EXERCISES: CPT | Mod: GP | Performed by: PHYSICAL THERAPIST

## 2017-09-13 PROCEDURE — 40000718 ZZHC STATISTIC PT DEPARTMENT ORTHO VISIT: Performed by: PHYSICAL THERAPIST

## 2017-09-18 ENCOUNTER — HOSPITAL ENCOUNTER (OUTPATIENT)
Dept: PHYSICAL THERAPY | Facility: CLINIC | Age: 62
Setting detail: THERAPIES SERIES
End: 2017-09-18
Attending: ORTHOPAEDIC SURGERY
Payer: COMMERCIAL

## 2017-09-18 PROCEDURE — 97110 THERAPEUTIC EXERCISES: CPT | Mod: GP | Performed by: PHYSICAL THERAPIST

## 2017-09-18 PROCEDURE — 40000718 ZZHC STATISTIC PT DEPARTMENT ORTHO VISIT: Performed by: PHYSICAL THERAPIST

## 2017-09-20 ENCOUNTER — HOSPITAL ENCOUNTER (OUTPATIENT)
Dept: PHYSICAL THERAPY | Facility: CLINIC | Age: 62
Setting detail: THERAPIES SERIES
End: 2017-09-20
Attending: ORTHOPAEDIC SURGERY
Payer: COMMERCIAL

## 2017-09-20 PROCEDURE — 97110 THERAPEUTIC EXERCISES: CPT | Mod: GP | Performed by: PHYSICAL THERAPIST

## 2017-09-20 PROCEDURE — 40000718 ZZHC STATISTIC PT DEPARTMENT ORTHO VISIT: Performed by: PHYSICAL THERAPIST

## 2017-09-25 ENCOUNTER — HOSPITAL ENCOUNTER (OUTPATIENT)
Dept: PHYSICAL THERAPY | Facility: CLINIC | Age: 62
Setting detail: THERAPIES SERIES
End: 2017-09-25
Attending: ORTHOPAEDIC SURGERY
Payer: COMMERCIAL

## 2017-09-25 PROCEDURE — 40000718 ZZHC STATISTIC PT DEPARTMENT ORTHO VISIT: Performed by: PHYSICAL THERAPIST

## 2017-09-25 PROCEDURE — 97112 NEUROMUSCULAR REEDUCATION: CPT | Mod: GP | Performed by: PHYSICAL THERAPIST

## 2017-09-25 PROCEDURE — 97110 THERAPEUTIC EXERCISES: CPT | Mod: GP | Performed by: PHYSICAL THERAPIST

## 2017-10-09 ENCOUNTER — HOSPITAL ENCOUNTER (OUTPATIENT)
Dept: PHYSICAL THERAPY | Facility: CLINIC | Age: 62
Setting detail: THERAPIES SERIES
End: 2017-10-09
Attending: ORTHOPAEDIC SURGERY
Payer: COMMERCIAL

## 2017-10-09 PROCEDURE — 40000718 ZZHC STATISTIC PT DEPARTMENT ORTHO VISIT: Performed by: PHYSICAL THERAPIST

## 2017-10-09 PROCEDURE — 97110 THERAPEUTIC EXERCISES: CPT | Mod: GP | Performed by: PHYSICAL THERAPIST

## 2017-10-09 PROCEDURE — 97112 NEUROMUSCULAR REEDUCATION: CPT | Mod: GP | Performed by: PHYSICAL THERAPIST

## 2017-10-09 NOTE — PROGRESS NOTES
"Outpatient Physical Therapy Discharge Note     Patient: Jacques Sharp  : 1955    Beginning/End Dates of Reporting Period:  17 to 10/9/2017    Referring Provider: Cal Quezada PA-C    Therapy Diagnosis: Pain; weakness; impaired gait; impaired ROM; impaired proprioception s/p left TKA performed 17 by Dr. Miri MD.     Client Self Report: The hip is feeling really good.  Patient reports he does not feel pain or difficulty with ADL's.  Some soreness in the glutes after the strengthening exercises.  Able to ice on and off for pain control.      Objective Measurements:  Objective Measure: LEFS  Details: 56/80 (30% disability) (60% improvement from initial evaluation)  Objective Measure: Step up height  Details: 12\" without difficulty  Objective Measure: Gait  Details: No gross deviations        Goals:  Goal Identifier     Goal Description Following PT interventions, patient will be able to walk >=2 blocks without an assistive device to allow for normalized community mobility.   Target Date 10/23/17   Date Met  17   Progress:     Goal Identifier     Goal Description Following PT interventions, patient will be able to walk in the woods for ~100 yards without difficulty to allow for pt to hunt.   Target Date 17   Date Met  10/09/17   Progress:     Goal Identifier     Goal Description Following PT interventions, patient will be independent with his HEP to reduce future occurrence of pain and disability.   Target Date 17   Date Met  17   Progress:     Progress Toward Goals:   Progress this reporting period: Jacques attended 6 physical therapy sessions to s/p his     Plan:  Discharge from therapy.    Discharge:    Reason for Discharge: Patient has met all goals.    Equipment Issued: Theraband    Discharge Plan: Patient to continue home program.    Nargis Covington, PT, DPT  Doctor of Physical Therapy #[id number]  [facility] " [facility]  [phone]  [email or web address]

## 2017-11-02 DIAGNOSIS — E78.5 HYPERLIPIDEMIA LDL GOAL <70: ICD-10-CM

## 2017-11-02 RX ORDER — ATORVASTATIN CALCIUM 80 MG/1
80 TABLET, FILM COATED ORAL DAILY
Qty: 90 TABLET | Refills: 0 | Status: SHIPPED | OUTPATIENT
Start: 2017-11-02 | End: 2018-01-11

## 2017-11-09 ENCOUNTER — OFFICE VISIT (OUTPATIENT)
Dept: FAMILY MEDICINE | Facility: CLINIC | Age: 62
End: 2017-11-09
Payer: COMMERCIAL

## 2017-11-09 VITALS
SYSTOLIC BLOOD PRESSURE: 139 MMHG | DIASTOLIC BLOOD PRESSURE: 76 MMHG | WEIGHT: 165 LBS | HEART RATE: 67 BPM | TEMPERATURE: 97.5 F | BODY MASS INDEX: 27.46 KG/M2

## 2017-11-09 DIAGNOSIS — Z12.11 SPECIAL SCREENING FOR MALIGNANT NEOPLASMS, COLON: Primary | ICD-10-CM

## 2017-11-09 DIAGNOSIS — N45.1 EPIDIDYMITIS: ICD-10-CM

## 2017-11-09 PROCEDURE — 99213 OFFICE O/P EST LOW 20 MIN: CPT | Performed by: PHYSICIAN ASSISTANT

## 2017-11-09 ASSESSMENT — ENCOUNTER SYMPTOMS
EYE PAIN: 0
FREQUENCY: 0
SHORTNESS OF BREATH: 0
WHEEZING: 0
NAUSEA: 0
HEADACHES: 0
WEAKNESS: 0
SEIZURES: 0
PALPITATIONS: 0
EYE REDNESS: 0
DIZZINESS: 0
WEIGHT LOSS: 0
ABDOMINAL PAIN: 0
HEARTBURN: 0
NECK PAIN: 0
DOUBLE VISION: 0
TINGLING: 0
EYE DISCHARGE: 0
VOMITING: 0
FOCAL WEAKNESS: 0
MYALGIAS: 0
SENSORY CHANGE: 0
BLURRED VISION: 0
ORTHOPNEA: 0
SPUTUM PRODUCTION: 0
NERVOUS/ANXIOUS: 0
DIARRHEA: 0
DEPRESSION: 0
BLOOD IN STOOL: 0
PHOTOPHOBIA: 0
INSOMNIA: 0
HALLUCINATIONS: 0
DYSURIA: 0
COUGH: 0
HEMOPTYSIS: 0
LOSS OF CONSCIOUSNESS: 0
SORE THROAT: 0
CONSTIPATION: 0
DIAPHORESIS: 0
BACK PAIN: 0
NEUROLOGICAL NEGATIVE: 1
FEVER: 0

## 2017-11-09 ASSESSMENT — LIFESTYLE VARIABLES: SUBSTANCE_ABUSE: 0

## 2017-11-09 NOTE — MR AVS SNAPSHOT
After Visit Summary   11/9/2017    Jacques Sharp    MRN: [id number]           Patient Information     Date Of Birth          1955        Visit Information        Provider Department      11/9/2017 4:00 PM Jasper Garcia PA-C [facility]        Today's Diagnoses     Special screening for malignant neoplasms, colon    -  1    Epididymitis          Care Instructions      Epididymitis  Inflammation of the epididymis can cause pain and swelling in your scrotum. The epididymis is a small tube next to the testicle that stores sperm. Epididymitis is usually caused by an infection. In sexually active men, it is often caused by a sexually transmitted disease (STD) such as chlamydia or gonorrhea. In boys and in men over 40, it can be from bacteria from other parts of the urinary tract (not an STD infection).  Symptoms may begin with pain in the lower belly (abdomen) or low back. The pain then spreads down into the scrotum. Usually only one side is affected. The testicle and scrotum swell and become very painful and red. You may have fever and a burning when passing urine. Sometimes you may have a discharge from the penis.  Treatment is with antibiotics, and anti-inflammatory and pain medicines. The condition should get better over the first few days of treatment. But it will take several weeks for all the swelling and discomfort to go away. If your healthcare provider suspects that an STD is the cause, your sexual partners may need to be treated.  Home care  The following will help you care for yourself at home:    Support the scrotum. When lying down, place a rolled towel under the scrotum. When walking, use an athletic supporter or 2 pairs of jockey-style underwear.    To relieve pain, put ice packs on the inflamed area. You can make your own ice pack by putting ice cubes in a sealed plastic bag wrapped in a thin towel.    You may use over-the-counter medicines to control pain, unless  another medicine was given. If you have chronic liver or kidney disease, talk with your healthcare provider before taking these medicines. Also talk with your provider if you've ever had a stomach ulcer or GI bleeding.    Rest in bed for the first few days until the fever, pain, and swelling get better. It may take several weeks for all of the swelling to go away.    Constipation can make you strain. This makes the pain worse. Avoid constipation by eating natural laxatives such as prunes, fresh fruits, and whole-grain cereals. If necessary, use a mild over-the-counter laxative for constipation. Mineral oil can be used to keep the stools soft.    Do not have sex until you have finished all treatment and all symptoms have cleared.    Take all medicine as directed. Do not miss any doses and do not stop early even if you feel better.  Follow-up care  Follow up with your healthcare provider, or as advised, to be sure you are responding properly to treatment. If a culture was taken, you may call for the result as directed. A culture test can ensure that you are on the correct antibiotic.   When to seek medical advice  Call your healthcare provider right away if any of these occur:    Fever of 100.4 F (38 C), or as directed by your healthcare provider    Increasing pain or swelling of the testicle after starting treatment    Pressure or pain in your bladder that gets worse    Unable to pass urine for 8 hours  Date Last Reviewed: 9/1/2016 2000-2017 [employer]. [address] [place] [address]. All rights reserved. This information is not intended as a substitute for professional medical care. Always follow your healthcare professional's instructions.                Follow-ups after your visit        Additional Services     GASTROENTEROLOGY ADULT REF PROCEDURE ONLY       Last Lab Result: Creatinine (mg/dL)       Date                     Value                 08/23/2017               0.75              ----------  Body mass index is 27.46 kg/(m^2).     Needed:  No  Language:  English    Patient will be contacted to schedule procedure.     Please be aware that coverage of these services is subject to the terms and limitations of your health insurance plan.  Call member services at your health plan with any benefit or coverage questions.  Any procedures must be performed at a [place] facility OR coordinated by your clinic's referral office.    Please bring the following with you to your appointment:    (1) Any X-Rays, CTs or MRIs which have been performed.  Contact the facility where they were done to arrange for  prior to your scheduled appointment.    (2) List of current medications   (3) This referral request   (4) Any documents/labs given to you for this referral                  Your next 10 appointments already scheduled     Dec 07, 2017  9:00 AM CST   Return Visit with Niurka Frederick PA-C   [facility] ([facility])    [address] [address] [phone]              Future tests that were ordered for you today     Open Future Orders        Priority Expected Expires Ordered    US Testicular and Scrotum Routine  11/9/2018 11/9/2017            Who to contact     If you have questions or need follow up information about today's clinic visit or your schedule please contact [facility] directly at [phone].  Normal or non-critical lab and imaging results will be communicated to you by MyChart, letter or phone within 4 business days after the clinic has received the results. If you do not hear from us within 7 days, please contact the clinic through MyChart or phone. If you have a critical or abnormal lab result, we will notify you by phone as soon as possible.  Submit refill requests through [employer] or call your pharmacy and they will forward the refill request to us. Please allow 3 business  "days for your refill to be completed.          Additional Information About Your Visit        Functional Neuromodulationhart Information     [employer] lets you send messages to your doctor, view your test results, renew your prescriptions, schedule appointments and more. To sign up, go to [email or web address] . Click on \"Log in\" on the left side of the screen, which will take you to the Welcome page. Then click on \"Sign up Now\" on the right side of the page.     You will be asked to enter the access code listed below, as well as some personal information. Please follow the directions to create your username and password.     Your access code is: [email or web address]  Expires: 2017 10:38 AM     Your access code will  in 90 days. If you need help or a new code, please call your [place] clinic or [phone].        Care EveryWhere ID     This is your Care EveryWhere ID. This could be used by other organizations to access your [place] medical records  [id number]        Your Vitals Were     Pulse Temperature BMI (Body Mass Index)             67 97.5  F (36.4  C) (Tympanic) 27.46 kg/m2          Blood Pressure from Last 3 Encounters:   17 139/76   17 126/70   17 155/72    Weight from Last 3 Encounters:   17 165 lb (74.8 kg)   17 157 lb (71.2 kg)   17 162 lb (73.5 kg)              We Performed the Following     GASTROENTEROLOGY ADULT REF PROCEDURE ONLY        Primary Care Provider Office Phone # Fax #    Luis Alberto Mcgowan [phone] [address] [place] [address]        Equal Access to Services     [facility] : Hadii javed [place]ston, douglas izquierdo, qagomez pereiraalsudhakar mo. So [facility] [phone].    ATENCIÓN: Si habla español, tiene a nuñez disposición servicios gratuitos de asistencia lingüística. Llame al [phone].    We comply with applicable federal civil rights laws and [place] laws. We do not discriminate on " the basis of race, color, national origin, age, disability, sex, sexual orientation, or gender identity.            Thank you!     Thank you for choosing [facility]  for your care. Our goal is always to provide you with excellent care. Hearing back from our patients is one way we can continue to improve our services. Please take a few minutes to complete the written survey that you may receive in the mail after your visit with us. Thank you!             Your Updated Medication List - Protect others around you: Learn how to safely use, store and throw away your medicines at www.disposemymeds.org.          This list is accurate as of: 11/9/17  4:26 PM.  Always use your most recent med list.                   Brand Name Dispense Instructions for use Diagnosis    acetaminophen 325 MG tablet    TYLENOL    100 tablet    Take 1-2 tablets (325-650 mg) by mouth every 4 hours as needed    S/P coronary angiogram       aspirin 81 MG chewable tablet     90 tablet    Take 1 tablet (81 mg) by mouth daily No further refills until seen by cardiology    Paroxysmal supraventricular tachycardia (H)       atorvastatin 80 MG tablet    LIPITOR    90 tablet    Take 1 tablet (80 mg) by mouth daily    Hyperlipidemia LDL goal <70       metoprolol 25 MG 24 hr tablet    TOPROL-XL    90 tablet    Take 1 tablet (25 mg) by mouth daily    Paroxysmal supraventricular tachycardia (H)       nitroGLYcerin 0.4 MG sublingual tablet    NITROSTAT    60 tablet    Place 1 tablet (0.4 mg) under the tongue every 5 minutes as needed for chest pain    Acute MI

## 2017-11-09 NOTE — NURSING NOTE
"Chief Complaint   Patient presents with     Penis/Scrotum Problem       Initial /76 (BP Location: Right arm, Patient Position: Chair, Cuff Size: Adult Large)  Pulse 67  Temp 97.5  F (36.4  C) (Tympanic)  Wt 165 lb (74.8 kg)  BMI 27.46 kg/m2 Estimated body mass index is 27.46 kg/(m^2) as calculated from the following:    Height as of 8/23/17: 5' 5\" (1.651 m).    Weight as of this encounter: 165 lb (74.8 kg).  Medication Reconciliation: complete    Health Maintenance that is potentially due pending provider review:  Colonoscopy/FIT    Gave pt phone number/pended order to schedule mammo and/or colonoscopy(or FIT)    Is there anyone who you would like to be able to receive your results? No  If yes have patient fill out AYAKA    Sweetie HANNA CMA    "

## 2017-11-09 NOTE — PATIENT INSTRUCTIONS
Epididymitis  Inflammation of the epididymis can cause pain and swelling in your scrotum. The epididymis is a small tube next to the testicle that stores sperm. Epididymitis is usually caused by an infection. In sexually active men, it is often caused by a sexually transmitted disease (STD) such as chlamydia or gonorrhea. In boys and in men over 40, it can be from bacteria from other parts of the urinary tract (not an STD infection).  Symptoms may begin with pain in the lower belly (abdomen) or low back. The pain then spreads down into the scrotum. Usually only one side is affected. The testicle and scrotum swell and become very painful and red. You may have fever and a burning when passing urine. Sometimes you may have a discharge from the penis.  Treatment is with antibiotics, and anti-inflammatory and pain medicines. The condition should get better over the first few days of treatment. But it will take several weeks for all the swelling and discomfort to go away. If your healthcare provider suspects that an STD is the cause, your sexual partners may need to be treated.  Home care  The following will help you care for yourself at home:    Support the scrotum. When lying down, place a rolled towel under the scrotum. When walking, use an athletic supporter or 2 pairs of jockey-style underwear.    To relieve pain, put ice packs on the inflamed area. You can make your own ice pack by putting ice cubes in a sealed plastic bag wrapped in a thin towel.    You may use over-the-counter medicines to control pain, unless another medicine was given. If you have chronic liver or kidney disease, talk with your healthcare provider before taking these medicines. Also talk with your provider if you've ever had a stomach ulcer or GI bleeding.    Rest in bed for the first few days until the fever, pain, and swelling get better. It may take several weeks for all of the swelling to go away.    Constipation can make you strain. This  makes the pain worse. Avoid constipation by eating natural laxatives such as prunes, fresh fruits, and whole-grain cereals. If necessary, use a mild over-the-counter laxative for constipation. Mineral oil can be used to keep the stools soft.    Do not have sex until you have finished all treatment and all symptoms have cleared.    Take all medicine as directed. Do not miss any doses and do not stop early even if you feel better.  Follow-up care  Follow up with your healthcare provider, or as advised, to be sure you are responding properly to treatment. If a culture was taken, you may call for the result as directed. A culture test can ensure that you are on the correct antibiotic.   When to seek medical advice  Call your healthcare provider right away if any of these occur:    Fever of 100.4 F (38 C), or as directed by your healthcare provider    Increasing pain or swelling of the testicle after starting treatment    Pressure or pain in your bladder that gets worse    Unable to pass urine for 8 hours  Date Last Reviewed: 9/1/2016 2000-2017 [employer]. [address] [place] [address], [place] [address]. All rights reserved. This information is not intended as a substitute for professional medical care. Always follow your healthcare professional's instructions.

## 2017-11-09 NOTE — PROGRESS NOTES
HPI    SUBJECTIVE:   Jacques Sharp is a 62 year old male who presents to clinic today for a lump in the right scrotal area. He states this is tender. He's had symptoms of tenderness for the past couple of months but noticed lump last week. He has had a previous vasectomy.          Testicle issue       Duration: Couple months     Description (location/character/radiation): Patient states that a couple months ago he noticed some irritation. Last week he noticed a lump     Intensity:  Mild-moderate    Accompanying signs and symptoms: pain     History (similar episodes/previous evaluation): None    Precipitating or alleviating factors: None    Therapies tried and outcome: None     Problem list and histories reviewed & adjusted, as indicated.  Additional history: as documented    Patient Active Problem List   Diagnosis     Hyperlipidemia LDL goal <70     Acute MI     Paroxysmal supraventricular tachycardia (H)     Past Surgical History:   Procedure Laterality Date     CORONARY ANGIOGRAPHY ADULT ORDER  1/30/14, 12/24/13     EP ABLATION / EP STUDIES  12/23/13     H ABLATION SVT  12/23/13     LUNG SURGERY      benign cyst removed.      VASECTOMY         Social History   Substance Use Topics     Smoking status: Former Smoker     Smokeless tobacco: Never Used      Comment: quit 30+ years ago     Alcohol use No     Family History   Problem Relation Age of Onset     Hypertension Mother      CANCER Father      lung     Cancer - colorectal No family hx of      Prostate Cancer No family hx of          Current Outpatient Prescriptions   Medication Sig Dispense Refill     atorvastatin (LIPITOR) 80 MG tablet Take 1 tablet (80 mg) by mouth daily 90 tablet 0     aspirin 81 MG chewable tablet Take 1 tablet (81 mg) by mouth daily No further refills until seen by cardiology 90 tablet 3     metoprolol (TOPROL-XL) 25 MG 24 hr tablet Take 1 tablet (25 mg) by mouth daily 90 tablet 3     acetaminophen (TYLENOL) 325 MG tablet Take 1-2 tablets  (325-650 mg) by mouth every 4 hours as needed 100 tablet      nitroglycerin (NITROSTAT) 0.4 MG SL tablet Place 1 tablet (0.4 mg) under the tongue every 5 minutes as needed for chest pain 60 tablet 3     No Known Allergies  Labs reviewed in EPIC      Reviewed and updated as needed this visit by clinical staff     Reviewed and updated as needed this visit by Provider       Review of Systems   Constitutional: Negative for diaphoresis, fever, malaise/fatigue and weight loss.   HENT: Negative for congestion, ear discharge, ear pain, hearing loss, nosebleeds and sore throat.    Eyes: Negative for blurred vision, double vision, photophobia, pain, discharge and redness.   Respiratory: Negative for cough, hemoptysis, sputum production, shortness of breath and wheezing.    Cardiovascular: Negative for chest pain, palpitations, orthopnea and leg swelling.   Gastrointestinal: Negative for abdominal pain, blood in stool, constipation, diarrhea, heartburn, melena, nausea and vomiting.   Genitourinary: Negative.  Negative for dysuria, frequency and urgency.   Musculoskeletal: Negative for back pain, joint pain, myalgias and neck pain.   Skin: Negative for itching and rash.   Neurological: Negative.  Negative for dizziness, tingling, sensory change, focal weakness, seizures, loss of consciousness, weakness and headaches.   Endo/Heme/Allergies: Negative.    Psychiatric/Behavioral: Negative for depression, hallucinations, substance abuse and suicidal ideas. The patient is not nervous/anxious and does not have insomnia.          Physical Exam   Constitutional: He is oriented to person, place, and time and well-developed, well-nourished, and in no distress.   HENT:   Head: Normocephalic and atraumatic.   Right Ear: External ear normal.   Left Ear: External ear normal.   Nose: Nose normal.   Mouth/Throat: Oropharynx is clear and moist.   Eyes: Conjunctivae and EOM are normal. Pupils are equal, round, and reactive to light. Right eye  exhibits no discharge. Left eye exhibits no discharge. No scleral icterus.   Neck: Normal range of motion. Neck supple. No thyromegaly present.   Cardiovascular: Normal rate, regular rhythm, normal heart sounds and intact distal pulses.  Exam reveals no gallop and no friction rub.    No murmur heard.  Pulmonary/Chest: Effort normal and breath sounds normal. No respiratory distress. He has no wheezes. He has no rales. He exhibits no tenderness.   Abdominal: Soft. Bowel sounds are normal. He exhibits no distension and no mass. There is no tenderness. There is no rebound and no guarding.   Genitourinary: He exhibits abnormal scrotal mass, scrotal tenderness and epididymal tenderness.   Genitourinary Comments: There is a small lump above the right testicle and I do not believe this is attached to the epididymis.   Musculoskeletal: Normal range of motion. He exhibits no edema or tenderness.   Lymphadenopathy:     He has no cervical adenopathy.   Neurological: He is alert and oriented to person, place, and time. He has normal reflexes. No cranial nerve deficit. He exhibits normal muscle tone. Gait normal. Coordination normal.   Skin: Skin is warm and dry. No rash noted. No erythema.   Psychiatric: Mood, memory, affect and judgment normal.         (Z12.11) Special screening for malignant neoplasms, colon  (primary encounter diagnosis)  Comment:   Plan: GASTROENTEROLOGY ADULT REF PROCEDURE ONLY           (N45.1) Epididymitis  Comment:   Plan: US Testicular and Scrotum          Ultrasound was obtained to further evaluate this mass

## 2017-11-14 ENCOUNTER — HOSPITAL ENCOUNTER (OUTPATIENT)
Dept: ULTRASOUND IMAGING | Facility: CLINIC | Age: 62
Discharge: HOME | End: 2017-11-14
Attending: PHYSICIAN ASSISTANT | Admitting: PHYSICIAN ASSISTANT
Payer: COMMERCIAL

## 2017-11-14 DIAGNOSIS — N45.1 EPIDIDYMITIS: ICD-10-CM

## 2017-11-14 PROCEDURE — 93976 VASCULAR STUDY: CPT

## 2017-11-14 RX ORDER — LEVOFLOXACIN 500 MG/1
500 TABLET, FILM COATED ORAL DAILY
Qty: 10 TABLET | Refills: 0 | Status: SHIPPED | OUTPATIENT
Start: 2017-11-14 | End: 2018-01-08

## 2017-12-07 ENCOUNTER — OFFICE VISIT (OUTPATIENT)
Dept: ORTHOPEDICS | Facility: CLINIC | Age: 62
End: 2017-12-07
Payer: COMMERCIAL

## 2017-12-07 VITALS — DIASTOLIC BLOOD PRESSURE: 66 MMHG | SYSTOLIC BLOOD PRESSURE: 109 MMHG

## 2017-12-07 DIAGNOSIS — M19.041 PRIMARY OSTEOARTHRITIS OF RIGHT HAND: ICD-10-CM

## 2017-12-07 DIAGNOSIS — M25.541 ARTHRALGIA OF METACARPOPHALANGEAL JOINT, RIGHT: Primary | ICD-10-CM

## 2017-12-07 PROCEDURE — 20606 DRAIN/INJ JOINT/BURSA W/US: CPT | Mod: RT | Performed by: FAMILY MEDICINE

## 2017-12-07 PROCEDURE — 99213 OFFICE O/P EST LOW 20 MIN: CPT | Mod: 25 | Performed by: FAMILY MEDICINE

## 2017-12-07 RX ORDER — TRIAMCINOLONE ACETONIDE 40 MG/ML
40 INJECTION, SUSPENSION INTRA-ARTICULAR; INTRAMUSCULAR ONCE
Qty: 1 ML | Refills: 0 | OUTPATIENT
Start: 2017-12-07 | End: 2017-12-07

## 2017-12-07 NOTE — PROGRESS NOTES
Jacques Sharp  :  1955  DOS: 2017  MRN: [id number]    Sports Medicine Clinic Visit    PCP: Luis Alberto Mcgowan    Jacques Sharp is a 60 year old Right hand dominant male who is seen in consultation at the request of  Celina Kemp NP presenting with right hand pain.    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  Pain located over right 3rd MCP joint, nonradiating.  Additional Features:  Positive: swelling, grinding and pain in other joints.  Symptoms are better with Ibuprofen and Rest.  Symptoms are worse with: repetitive grasping/gripping activities.  Other evaluation and/or treatments so far consists of: Ice, Ibuprofen and Rest.  Recent imaging completed: X-rays completed 6/11/15.  Prior History of related problems: none     Social History: currently unemployed, former     Interim History - 2016  Since last visit on 12/10/15 patient has moderate right hand pain over last 5 months.  Right 3rd MCP joint steroid injection completed on 12/10/15 provided excellent relief for ~ 6 - 7 months.  Pain is worse with grasping and finger opposition motions.  No new injury in the interim.    Interim History - 2017  Since last visit on 16 patient has mild-moderate right third MCP joint pain.  Right hand 3rd MCP joint injection completed on  provided good relief for ~ 9 months.  Patient reports worsening pain over the last ~ 3 months mostly with grasping/gripping.  No new injury in the interim.      Review of Systems  Musculoskeletal: as above  Remainder of review of systems is negative including constitutional, CV, pulmonary, GI, Skin and Neurologic except as noted in HPI or medical history.    Past Medical History:   Diagnosis Date     Acute MI 2013     Coronary artery disease      Hyperlipidaemia      Hyperlipidaemia      Paroxysmal supraventricular tachycardia  "(H)      Past Surgical History:   Procedure Laterality Date     CORONARY ANGIOGRAPHY ADULT ORDER  1/30/14, 12/24/13     EP ABLATION / EP STUDIES  12/23/13     H ABLATION SVT  12/23/13     LUNG SURGERY      benign cyst removed.      VASECTOMY       Objective  /66  Ht (P) 5' 5\" (1.651 m)  Wt (P) 165 lb (74.8 kg)  BMI (P) 27.46 kg/m2    GENERAL APPEARANCE: healthy, alert and no distress   GAIT: NORMAL  SKIN: no suspicious lesions or rashes  NEURO: Normal strength and tone, mentation intact and speech normal  PSYCH:  mentation appears normal and affect normal/bright    Right Wrist and Hand exam    Inspection:       No swelling, bruising      Mild prominence of the third MCP joint      Bilateral ulnar deviation/angulation of the third digit at the MCP joint, right much greater than left    Tender:  Third MCP joint    Non Tender:       Remainder of the Wrist and Hand right    ROM:       Full and symmetric active and passive range of motion of the forearm, wrist and digits bilateral exception of terminal flexion and extension of the third MCP, which is limited somewhat by pain    Strength:       5/5 strength in the muscles of the hand, wrist and forearm bilateral    Neurovascular:       2+ radial pulses bilaterally with brisk capillary refill and      normal sensation to light touch in the radial, median and ulnar nerve distributions    Procedure: Ultrasound Guided right 3rd MCP Joint Injection  Consent given, and signed on chart.  Sterile prepping.    Ultrasound identification of the right side 3rd MCP Joint on both long and short axis.    The probe was placed in long axis view to the right side 3rd MCP joint.  A 1 inch 25 gauge needle was placed under ultrasound guidance inside the joint capsule. A mixture of 1.0 ml 1% lidocaine and 1.0 ml kenalog (40mg/ml) was injected without difficulty on the long axis view.    Good hemostasis and no complications.  Ultrasound documentation of needle placement and " injection.  Pre-procedure pain: 6/10.  Post-procedure pain: 2/10.      Radiology:  HAND RIGHT THREE OR MORE VIEWS June 11, 2015 8:57 AM   HISTORY: Swelling, pain, decreased range of motion of middle finger MP  joint. Effusion of hand joint.  COMPARISON: None.  IMPRESSION  IMPRESSION: No acute appearing abnormalities. There is joint space  narrowing at the third metacarpophalangeal joint. There is also  minimal degenerative-type marginal bony spurring related to the third  metacarpal head. No erosions are seen and the findings most likely are  related to simple degenerative change alone. No other bony  abnormalities.    Assessment:  1. Arthralgia of metacarpophalangeal joint, right    2. Primary osteoarthritis of right hand        Plan:  Discussed the assessment with the patient.  Follow up: prn  Repeat cortisone injection today  Consider surgical referral for consideration of arthroplasty or other procedure based on clinical response  Expectations and goals of CSI reviewed  Often 2-3 days for steroid effect, and can take up to two weeks for maximum effect  We discussed modified progressive pain-free activity as tolerated  Do not overuse in first two weeks if feeling better due to concern for vulnerability while steroid is working  Supportive care reviewed  All questions were answered today  Contact us with additional questions or concerns  Signs and sx of concern reviewed      Jordan Milner DO, KATIE  Primary Care Sports Medicine  [facility]       Disclaimer: This note consists of symbols derived from keyboarding, dictation and/or voice recognition software. As a result, there may be errors in the script that have gone undetected. Please consider this when interpreting information found in this chart.

## 2017-12-07 NOTE — Clinical Note
2017         RE: Jacques Sharp  [address] [address] [facility] [place] [address]        Dear Colleague,    Thank you for referring your patient, Jacques Sharp, to the [facility]. Please see a copy of my visit note below.    Jacques Sharp  :  1955  DOS: 2017  MRN: [id number]    Sports Medicine Clinic Visit    PCP: Luis Alberto Mcgowan    Jacques Sharp is a 60 year old Right hand dominant male who is seen in consultation at the request of  Celina Kemp NP presenting with right hand pain.    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  Pain located over right 3rd MCP joint, nonradiating.  Additional Features:  Positive: swelling, grinding and pain in other joints.  Symptoms are better with Ibuprofen and Rest.  Symptoms are worse with: repetitive grasping/gripping activities.  Other evaluation and/or treatments so far consists of: Ice, Ibuprofen and Rest.  Recent imaging completed: X-rays completed 6/11/15.  Prior History of related problems: none     Social History: currently unemployed, former     Interim History - 2016  Since last visit on 12/10/15 patient has moderate right hand pain over last 5 months.  Right 3rd MCP joint steroid injection completed on 12/10/15 provided excellent relief for ~ 6 - 7 months.  Pain is worse with grasping and finger opposition motions.  No new injury in the interim.    Interim History - 2017  Since last visit on 16 patient has mild-moderate right third MCP joint pain.  Right hand 3rd MCP joint injection completed on  provided good relief for ~ 9 months.  Patient reports worsening pain over the last ~ 3 months mostly with grasping/gripping.  No new injury in the interim.      Review of Systems  Musculoskeletal: as above  Remainder of review of systems is negative including constitutional, CV,  "pulmonary, GI, Skin and Neurologic except as noted in HPI or medical history.    Past Medical History:   Diagnosis Date     Acute MI 12/24/2013     Coronary artery disease      Hyperlipidaemia      Hyperlipidaemia      Paroxysmal supraventricular tachycardia (H)      Past Surgical History:   Procedure Laterality Date     CORONARY ANGIOGRAPHY ADULT ORDER  1/30/14, 12/24/13     EP ABLATION / EP STUDIES  12/23/13     H ABLATION SVT  12/23/13     LUNG SURGERY      benign cyst removed.      VASECTOMY       Objective  /66  Ht (P) 5' 5\" (1.651 m)  Wt (P) 165 lb (74.8 kg)  BMI (P) 27.46 kg/m2    GENERAL APPEARANCE: healthy, alert and no distress   GAIT: NORMAL  SKIN: no suspicious lesions or rashes  NEURO: Normal strength and tone, mentation intact and speech normal  PSYCH:  mentation appears normal and affect normal/bright    Right Wrist and Hand exam    Inspection:       No swelling, bruising      Mild prominence of the third MCP joint      Bilateral ulnar deviation/angulation of the third digit at the MCP joint, right much greater than left    Tender:  Third MCP joint    Non Tender:       Remainder of the Wrist and Hand right    ROM:       Full and symmetric active and passive range of motion of the forearm, wrist and digits bilateral exception of terminal flexion and extension of the third MCP, which is limited somewhat by pain    Strength:       5/5 strength in the muscles of the hand, wrist and forearm bilateral    Neurovascular:       2+ radial pulses bilaterally with brisk capillary refill and      normal sensation to light touch in the radial, median and ulnar nerve distributions    Procedure: Ultrasound Guided right 3rd MCP Joint Injection  Consent given, and signed on chart.  Sterile prepping.    Ultrasound identification of the right side 3rd MCP Joint on both long and short axis.    The probe was placed in long axis view to the right side 3rd MCP joint.  A 1 inch 25 gauge needle was placed under " ultrasound guidance inside the joint capsule. A mixture of 1.0 ml 1% lidocaine and 1.0 ml kenalog (40mg/ml) was injected without difficulty on the long axis view.    Good hemostasis and no complications.  Ultrasound documentation of needle placement and injection.  Pre-procedure pain: 6/10.  Post-procedure pain: 2/10.      Radiology:  HAND RIGHT THREE OR MORE VIEWS June 11, 2015 8:57 AM   HISTORY: Swelling, pain, decreased range of motion of middle finger MP  joint. Effusion of hand joint.  COMPARISON: None.  IMPRESSION  IMPRESSION: No acute appearing abnormalities. There is joint space  narrowing at the third metacarpophalangeal joint. There is also  minimal degenerative-type marginal bony spurring related to the third  metacarpal head. No erosions are seen and the findings most likely are  related to simple degenerative change alone. No other bony  abnormalities.    Assessment:  1. Arthralgia of metacarpophalangeal joint, right    2. Primary osteoarthritis of right hand        Plan:  Discussed the assessment with the patient.  Follow up: prn  Repeat cortisone injection today  Consider surgical referral for consideration of arthroplasty or other procedure based on clinical response  Expectations and goals of CSI reviewed  Often 2-3 days for steroid effect, and can take up to two weeks for maximum effect  We discussed modified progressive pain-free activity as tolerated  Do not overuse in first two weeks if feeling better due to concern for vulnerability while steroid is working  Supportive care reviewed  All questions were answered today  Contact us with additional questions or concerns  Signs and sx of concern reviewed      Jordan Milner DO, KATIE  Primary Care Sports Medicine  [facility]       Disclaimer: This note consists of symbols derived from keyboarding, dictation and/or voice recognition software. As a result, there may be errors in the script that have gone undetected. Please consider  this when interpreting information found in this chart.      Again, thank you for allowing me to participate in the care of your patient.        Sincerely,        Jordan Milner, DO

## 2017-12-07 NOTE — MR AVS SNAPSHOT
"              After Visit Summary   2017    Jacques Sharp    MRN: [id number]           Patient Information     Date Of Birth          1955        Visit Information        Provider Department      2017 1:40 PM Jordan Milner,  [facility]        Today's Diagnoses     Arthralgia of metacarpophalangeal joint, right    -  1    Primary osteoarthritis of right hand           Follow-ups after your visit        Who to contact     If you have questions or need follow up information about today's clinic visit or your schedule please contact [facility] directly at [phone].  Normal or non-critical lab and imaging results will be communicated to you by [employer], letter or phone within 4 business days after the clinic has received the results. If you do not hear from us within 7 days, please contact the clinic through [employer] or phone. If you have a critical or abnormal lab result, we will notify you by phone as soon as possible.  Submit refill requests through [facility] or call your pharmacy and they will forward the refill request to us. Please allow 3 business days for your refill to be completed.          Additional Information About Your Visit        MyChart Information     [facility] lets you send messages to your doctor, view your test results, renew your prescriptions, schedule appointments and more. To sign up, go to [email or web address] . Click on \"Log in\" on the left side of the screen, which will take you to the Welcome page. Then click on \"Sign up Now\" on the right side of the page.     You will be asked to enter the access code listed below, as well as some personal information. Please follow the directions to create your username and password.     Your access code is: [email or web address]  Expires: 3/7/2018  2:14 PM     Your access code will  in 90 days. If you need help or a new code, please call your [place] clinic or " [phone].        Care EveryWhere ID     This is your Care EveryWhere ID. This could be used by other organizations to access your [facility] medical records  [id number]         Blood Pressure from Last 3 Encounters:   12/07/17 109/66   11/09/17 139/76   08/23/17 126/70    Weight from Last 3 Encounters:   12/07/17 (P) 165 lb (74.8 kg)   11/09/17 165 lb (74.8 kg)   08/23/17 157 lb (71.2 kg)              We Performed the Following     HC ARTHROCENTESIS ASPIR&/INJ INTERM JT/BURS W/US     TRIAMCINOLONE ACET INJ NOS          Today's Medication Changes          These changes are accurate as of: 12/7/17  2:14 PM.  If you have any questions, ask your nurse or doctor.               Start taking these medicines.        Dose/Directions    triamcinolone acetonide 40 MG/ML injection   Commonly known as:  KENALOG-40   Used for:  Arthralgia of metacarpophalangeal joint, right, Primary osteoarthritis of right hand   Started by:  Jordan Milner DO        Dose:  40 mg   1 mL (40 mg) by INTRA-ARTICULAR route once for 1 dose   Quantity:  1 mL   Refills:  0            Where to get your medicines      Some of these will need a paper prescription and others can be bought over the counter.  Ask your nurse if you have questions.     You don't need a prescription for these medications     triamcinolone acetonide 40 MG/ML injection                Primary Care Provider Office Phone # Fax #    Luis Alberto Mcgowan [phone]       [address] [place] [address]        Equal Access to Services     NOE PEÑA : Hadii javed sotoo Soruthy, waaxda luqadaha, qaybta kaalmaelizabeth denisluis alberto [facility] swift. So [facility] [phone].    ATENCIÓN: Si habla español, tiene a nuñez disposición servicios gratuitos de asistencia lingüística. Llame al [phone].    We comply with applicable federal civil rights laws and [place] laws. We do not discriminate on the basis of race, color, national origin,  age, disability, sex, sexual orientation, or gender identity.            Thank you!     Thank you for choosing [facility]  for your care. Our goal is always to provide you with excellent care. Hearing back from our patients is one way we can continue to improve our services. Please take a few minutes to complete the written survey that you may receive in the mail after your visit with us. Thank you!             Your Updated Medication List - Protect others around you: Learn how to safely use, store and throw away your medicines at www.disposemymeds.org.          This list is accurate as of: 12/7/17  2:14 PM.  Always use your most recent med list.                   Brand Name Dispense Instructions for use Diagnosis    acetaminophen 325 MG tablet    TYLENOL    100 tablet    Take 1-2 tablets (325-650 mg) by mouth every 4 hours as needed    S/P coronary angiogram       aspirin 81 MG chewable tablet     90 tablet    Take 1 tablet (81 mg) by mouth daily No further refills until seen by cardiology    Paroxysmal supraventricular tachycardia (H)       atorvastatin 80 MG tablet    LIPITOR    90 tablet    Take 1 tablet (80 mg) by mouth daily    Hyperlipidemia LDL goal <70       levofloxacin 500 MG tablet    LEVAQUIN    10 tablet    Take 1 tablet (500 mg) by mouth daily    Epididymitis       metoprolol 25 MG 24 hr tablet    TOPROL-XL    90 tablet    Take 1 tablet (25 mg) by mouth daily    Paroxysmal supraventricular tachycardia (H)       nitroGLYcerin 0.4 MG sublingual tablet    NITROSTAT    60 tablet    Place 1 tablet (0.4 mg) under the tongue every 5 minutes as needed for chest pain    Acute MI       triamcinolone acetonide 40 MG/ML injection    KENALOG-40    1 mL    1 mL (40 mg) by INTRA-ARTICULAR route once for 1 dose    Arthralgia of metacarpophalangeal joint, right, Primary osteoarthritis of right hand

## 2018-01-08 ENCOUNTER — OFFICE VISIT (OUTPATIENT)
Dept: FAMILY MEDICINE | Facility: CLINIC | Age: 63
End: 2018-01-08
Payer: COMMERCIAL

## 2018-01-08 VITALS
HEIGHT: 65 IN | TEMPERATURE: 98.3 F | WEIGHT: 165 LBS | HEART RATE: 79 BPM | DIASTOLIC BLOOD PRESSURE: 63 MMHG | BODY MASS INDEX: 27.49 KG/M2 | SYSTOLIC BLOOD PRESSURE: 109 MMHG

## 2018-01-08 DIAGNOSIS — L57.0 ACTINIC KERATOSIS: ICD-10-CM
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
## 2018-01-08 PROCEDURE — 99213 OFFICE O/P EST LOW 20 MIN: CPT | Mod: 25 | Performed by: PHYSICIAN ASSISTANT

## 2018-01-08 PROCEDURE — 17000 DESTRUCT PREMALG LESION: CPT | Performed by: PHYSICIAN ASSISTANT

## 2018-01-08 NOTE — PROGRESS NOTES
"  SUBJECTIVE:   Jacques Sharp is a 62 year old male who presents to clinic today for the following health issues:      Musculoskeletal problem/pain      Duration: 4 days    Description  Location: Left wrist/forearm    Intensity:  Moderate, 8-9/10 when it is really bothering him    Accompanying signs and symptoms: radiation of pain to elbow, sometimes some throbbing.  Sometimes when he holds his arm still or in a certain position, pain will subside    History  Previous similar problem: no   Previous evaluation:  none    Precipitating or alleviating factors:  Trauma or overuse: YES- Thinks it may be from the exercise machine at [employer]  Aggravating factors include: lifting, exercise and overuse    Therapies tried and outcome: ice, tylenol    Started bothering on Fri, then hard to sleep last 2 nights.  R handed.    Sore on R thigh near knee.  X 1 yr.  Doesn't hurt.    Problem list and histories reviewed & adjusted, as indicated.  Additional history: as documented    Reviewed and updated as needed this visit by clinical staffTobacco  Allergies  Meds  Problems  Med Hx  Surg Hx  Fam Hx  Soc Hx        Reviewed and updated as needed this visit by Provider  Tobacco  Allergies  Meds  Problems  Med Hx  Surg Hx  Fam Hx  Soc Hx          ROS:  Constitutional, musculoskeletal, skin systems are negative, except as otherwise noted.    OBJECTIVE:   /63 (BP Location: Right arm, Patient Position: Chair, Cuff Size: Adult Large)  Pulse 79  Temp 98.3  F (36.8  C) (Tympanic)  Ht 5' 5\" (1.651 m)  Wt 165 lb (74.8 kg)  BMI 27.46 kg/m2  Body mass index is 27.46 kg/(m^2).  GENERAL: healthy, alert and no distress  SKIN: R dorsal distal thigh with 0.8 cm dry rough pale lesion  MS: L wrist with normal ROM, no swelling or atrophy, less pain with opposed flexion than opposed extension, minimal tenderness to palpation        ASSESSMENT/PLAN:       ICD-10-CM    1. Overuse injury wrist T14.8XXA PHYSICAL THERAPY REFERRAL     " order for DME   2. Actinic keratosis L57.0 DESTRUCT PREMALIGNANT LESION, FIRST   AK frozen with LN x 2    Patient Instructions   Stop doing the arm exercises at [facility]   Try wrist splint   If not improving, Physical Therapy      tylenol, max 1000 mg (3 tabs normal strength or 2 extra strength), can repeat in 4 hrs but max 4 doses within a day        Celia Lu PA-C  [facility]

## 2018-01-08 NOTE — MR AVS SNAPSHOT
"              After Visit Summary   1/8/2018    Jacques Sharp    MRN: [id number]           Patient Information     Date Of Birth          1955        Visit Information        Provider Department      1/8/2018 3:00 PM Celia Lu PA-C [facility]        Today's Diagnoses     Overuse injury wrist    -  1      Care Instructions    Stop doing the arm exercises at [facility]   Try wrist splint   If not improving, Physical Therapy      tylenol, max 1000 mg (3 tabs normal strength or 2 extra strength), can repeat in 4 hrs but max 4 doses within a day            Follow-ups after your visit        Additional Services     PHYSICAL THERAPY REFERRAL       *This therapy referral will be filtered to a centralized scheduling office at [facility] and the patient will receive a call to schedule an appointment at a [facility] location most convenient for them. *     [facility] provides Physical Therapy evaluation and treatment and many specialty services across the [facility] system.  If requesting a specialty program, please choose from the list below.    If you have not heard from the scheduling office within 2 business days, please call [phone] for all locations, with the exception of [place], please call [phone].  Treatment: Evaluation & Treatment  Special Instructions/Modalities: eval and treat  Special Programs: None    Please be aware that coverage of these services is subject to the terms and limitations of your health insurance plan.  Call member services at your health plan with any benefit or coverage questions.      **Note to Provider:  If you are referring outside of [facility] for the therapy appointment, please list the name of the location in the \"special instructions\" above, print the referral and give to the patient to schedule the appointment.                  Your next 10 appointments already scheduled     Jan 11, 2018  9:00 AM CST   Return " "Visit with Niurak Frederick PA-C   [facility] ([facility])    [address] [address] [phone]              Who to contact     If you have questions or need follow up information about today's clinic visit or your schedule please contact [facility] directly at [phone].  Normal or non-critical lab and imaging results will be communicated to you by InferXhart, letter or phone within 4 business days after the clinic has received the results. If you do not hear from us within 7 days, please contact the clinic through InferXhart or phone. If you have a critical or abnormal lab result, we will notify you by phone as soon as possible.  Submit refill requests through [facility] or call your pharmacy and they will forward the refill request to us. Please allow 3 business days for your refill to be completed.          Additional Information About Your Visit        MyChart Information     [facility] lets you send messages to your doctor, view your test results, renew your prescriptions, schedule appointments and more. To sign up, go to [email or web address] . Click on \"Log in\" on the left side of the screen, which will take you to the Welcome page. Then click on \"Sign up Now\" on the right side of the page.     You will be asked to enter the access code listed below, as well as some personal information. Please follow the directions to create your username and password.     Your access code is: [email or web address]  Expires: 3/7/2018  2:14 PM     Your access code will  in 90 days. If you need help or a new code, please call your [place] clinic or [phone].        Care EveryWhere ID     This is your Care EveryWhere ID. This could be used by other organizations to access your [place] medical records  [id number]        Your Vitals Were     Pulse Temperature Height BMI (Body Mass Index)          79 98.3  F (36.8  C) (Tympanic) 5' 5\" " (1.651 m) 27.46 kg/m2         Blood Pressure from Last 3 Encounters:   01/08/18 109/63   12/07/17 109/66   11/09/17 139/76    Weight from Last 3 Encounters:   01/08/18 165 lb (74.8 kg)   12/07/17 (P) 165 lb (74.8 kg)   11/09/17 165 lb (74.8 kg)              We Performed the Following     PHYSICAL THERAPY REFERRAL          Today's Medication Changes          These changes are accurate as of: 1/8/18  3:30 PM.  If you have any questions, ask your nurse or doctor.               Stop taking these medicines if you haven't already. Please contact your care team if you have questions.     levofloxacin 500 MG tablet   Commonly known as:  LEVAQUIN   Stopped by:  Celia Lu PA-C                    Primary Care Provider Office Phone # Fax #    Luis Alberto Frank Mcgowan [phone] [address] [place] [address]        Equal Access to Services     [facility]: Hadii javed horta hadasho Soomaali, waaxda luqadaha, qaybta kaalmada adeegyada, waxay trevor astudillo . So [facility] [phone].    ATENCIÓN: Si habla español, tiene a nuñez disposición servicios gratuitos de asistencia lingüística. Llame al [phone].    We comply with applicable federal civil rights laws and [place] laws. We do not discriminate on the basis of race, color, national origin, age, disability, sex, sexual orientation, or gender identity.            Thank you!     Thank you for choosing [facility]  for your care. Our goal is always to provide you with excellent care. Hearing back from our patients is one way we can continue to improve our services. Please take a few minutes to complete the written survey that you may receive in the mail after your visit with us. Thank you!             Your Updated Medication List - Protect others around you: Learn how to safely use, store and throw away your medicines at www.disposemymeds.org.          This list is accurate as of: 1/8/18  3:30 PM.  Always use  your most recent med list.                   Brand Name Dispense Instructions for use Diagnosis    acetaminophen 325 MG tablet    TYLENOL    100 tablet    Take 1-2 tablets (325-650 mg) by mouth every 4 hours as needed    S/P coronary angiogram       aspirin 81 MG chewable tablet     90 tablet    Take 1 tablet (81 mg) by mouth daily No further refills until seen by cardiology    Paroxysmal supraventricular tachycardia (H)       atorvastatin 80 MG tablet    LIPITOR    90 tablet    Take 1 tablet (80 mg) by mouth daily    Hyperlipidemia LDL goal <70       metoprolol 25 MG 24 hr tablet    TOPROL-XL    90 tablet    Take 1 tablet (25 mg) by mouth daily    Paroxysmal supraventricular tachycardia (H)       nitroGLYcerin 0.4 MG sublingual tablet    NITROSTAT    60 tablet    Place 1 tablet (0.4 mg) under the tongue every 5 minutes as needed for chest pain    Acute MI

## 2018-01-08 NOTE — PATIENT INSTRUCTIONS
Stop doing the arm exercises at [facility]   Try wrist splint   If not improving, Physical Therapy      tylenol, max 1000 mg (3 tabs normal strength or 2 extra strength), can repeat in 4 hrs but max 4 doses within a day

## 2018-01-08 NOTE — NURSING NOTE
"Chief Complaint   Patient presents with     Musculoskeletal Problem       Initial /63 (BP Location: Right arm, Patient Position: Chair, Cuff Size: Adult Large)  Pulse 79  Temp 98.3  F (36.8  C) (Tympanic)  Ht 5' 5\" (1.651 m)  Wt 165 lb (74.8 kg)  BMI 27.46 kg/m2 Estimated body mass index is 27.46 kg/(m^2) as calculated from the following:    Height as of this encounter: 5' 5\" (1.651 m).    Weight as of this encounter: 165 lb (74.8 kg).  Medication Reconciliation: complete    Health Maintenance that is potentially due pending provider review:  Colonoscopy/FIT    Gave pt phone number/pended order to schedule mammo and/or colonoscopy(or FIT)    Is there anyone who you would like to be able to receive your results? No  If yes have patient fill out AYAKA      "

## 2018-01-11 ENCOUNTER — OFFICE VISIT (OUTPATIENT)
Dept: CARDIOLOGY | Facility: CLINIC | Age: 63
End: 2018-01-11
Attending: INTERNAL MEDICINE
Payer: COMMERCIAL

## 2018-01-11 VITALS
OXYGEN SATURATION: 97 % | BODY MASS INDEX: 28.12 KG/M2 | HEART RATE: 65 BPM | WEIGHT: 169 LBS | DIASTOLIC BLOOD PRESSURE: 69 MMHG | SYSTOLIC BLOOD PRESSURE: 121 MMHG

## 2018-01-11 DIAGNOSIS — E78.5 HYPERLIPIDEMIA LDL GOAL <70: ICD-10-CM

## 2018-01-11 DIAGNOSIS — I47.10 PAROXYSMAL SUPRAVENTRICULAR TACHYCARDIA (H): ICD-10-CM

## 2018-01-11 DIAGNOSIS — R53.83 FATIGUE, UNSPECIFIED TYPE: ICD-10-CM

## 2018-01-11 DIAGNOSIS — I25.10 CORONARY ARTERY DISEASE INVOLVING NATIVE CORONARY ARTERY OF NATIVE HEART WITHOUT ANGINA PECTORIS: Primary | ICD-10-CM

## 2018-01-11 PROCEDURE — 99214 OFFICE O/P EST MOD 30 MIN: CPT | Mod: 24 | Performed by: PHYSICIAN ASSISTANT

## 2018-01-11 RX ORDER — ATORVASTATIN CALCIUM 80 MG/1
80 TABLET, FILM COATED ORAL DAILY
Qty: 90 TABLET | Refills: 3 | Status: SHIPPED | OUTPATIENT
Start: 2018-01-11 | End: 2019-01-21

## 2018-01-11 RX ORDER — METOPROLOL SUCCINATE 25 MG/1
25 TABLET, EXTENDED RELEASE ORAL DAILY
Qty: 90 TABLET | Refills: 3 | Status: SHIPPED | OUTPATIENT
Start: 2018-01-11 | End: 2019-05-08

## 2018-01-11 NOTE — LETTER
"1/11/2018      Luis Alberto Mcgowan MD  [address] [place] [address]      RE: Jacques Sharp       Dear Colleague,    [facility] the [facility]    HISTORY OF PRESENT ILLNESS:  Mr. Sharp is a pleasant 62-year-old gentleman who presents to Cardiology office today for routine followup.      His cardiac history includes SVT and coronary artery disease.  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  Therefore, it was not able to be ablated.  Unfortunately, later that night he developed the sudden onset of severe substernal chest discomfort and was found to have a STEMI.  He underwent coronary angiography and was found to have a thrombotic lesion in the distal RCA.  The RPDA and RPLP did have collaterals from the left system.  He underwent stent placement to the distal RCA at that time.  He was also noted to have a high-grade OM2 lesion.  He did undergo staged PCI with a drug-eluting stent in 01/2014 to this lesion.  Overall, he has done quite well since that time.  He did have a nuclear stress test in 11/2015, which showed a small fixed inferior wall defect.  Gated images showed mild inferior wall hypokinesis.  His EF was calculated at 51% on that test.      Overall, the patient states he has been doing well.  He has been exercising intermittently at [employer] and has not had any cardiovascular limitations.  He does occasionally get short-lasting palpitations but nothing like he previously experienced.  These are quite rare.  The patient does state that when he has occasionally missed his medications, he notices that he is not quite as fatigued during the day.  He does state that he has been known to snore in the past.  His prior significant other did state that he snored frequently and occasionally would \"hold his breath while " "sleeping.\"  He does note that he does not feel particularly rested when he wakes up.  Several years ago an order for a sleep study was placed, but the patient never followed through on this.      CURRENT CARDIAC MEDICATIONS:   1.  Toprol-XL 25 mg daily.   2.  Atorvastatin 80 mg daily.   3.  Aspirin 81 mg daily.   4.  Sublingual nitro p.r.n.      The remainder of his medications, allergies and review of systems were reviewed and as are documented separately.      PHYSICAL EXAMINATION:   GENERAL:  The patient is a pleasant 62-year-old gentleman who appears his stated age.  He is in no apparent distress.   VITAL SIGNS:  His blood pressure is 121/69, pulse 65, weight is 169 pounds.   LUNGS:  Clear to auscultation bilaterally.   CARDIAC:  Reveals a regular rate and rhythm, no murmurs appreciated.   ABDOMEN:  Soft, nontender, nondistended.   EXTREMITIES:  Lower extremities show no evidence of edema.   NEUROLOGIC:  Alert and oriented.      ASSESSMENT AND PLAN:  The patient is a pleasant 62-year-old gentleman with a history of SVT with prior AVNRT ablation and known atrial tachycardia that was not able to be ablated.  He also has coronary artery disease with a prior inferior STEMI with stenting to the RCA and subsequent stenting to an OM2 branch of the circumflex.  He overall appears to be doing well from a cardiac standpoint.  He does have some fatigue and as I mentioned above noted that if he forgets to take his morning medications, he thinks he has felt somewhat better.  I did discuss that he could change his metoprolol and atorvastatin to nighttime and see if this helps his symptoms.  Additionally, he mentions that he does not wake up feeling refreshed and has been a heavy snorer with possible apnea in the past.  I did suggest that he be evaluated for sleep apnea and replaced a sleep medicine evaluation order.      I provided refills of his Toprol-XL and atorvastatin to his pharmacy.  Again, he will continue on aspirin " 81 mg lifelong.  It has been almost a year since his last lipid profile and I did suggest that he repeat a fasting lipid profile in the next 1-2 months.  We will recheck an ALT at that time as well.      Thank you for allowing us to participate in the care of this pleasant patient.       Outpatient Encounter Prescriptions as of 1/11/2018   Medication Sig Dispense Refill     metoprolol (TOPROL-XL) 25 MG 24 hr tablet Take 1 tablet (25 mg) by mouth daily 90 tablet 3     atorvastatin (LIPITOR) 80 MG tablet Take 1 tablet (80 mg) by mouth daily 90 tablet 3     aspirin 81 MG chewable tablet Take 1 tablet (81 mg) by mouth daily No further refills until seen by cardiology 90 tablet 3     acetaminophen (TYLENOL) 325 MG tablet Take 1-2 tablets (325-650 mg) by mouth every 4 hours as needed 100 tablet      nitroglycerin (NITROSTAT) 0.4 MG SL tablet Place 1 tablet (0.4 mg) under the tongue every 5 minutes as needed for chest pain 60 tablet 3     [DISCONTINUED] order for DME Equipment being ordered: Wrist Splint 1 Device 0     [DISCONTINUED] atorvastatin (LIPITOR) 80 MG tablet Take 1 tablet (80 mg) by mouth daily 90 tablet 0     [DISCONTINUED] metoprolol (TOPROL-XL) 25 MG 24 hr tablet Take 1 tablet (25 mg) by mouth daily 90 tablet 3     No facility-administered encounter medications on file as of 1/11/2018.      Again, thank you for allowing me to participate in the care of your patient.      Sincerely,    Niurka Frederick PA-C     [facility]

## 2018-01-11 NOTE — MR AVS SNAPSHOT
After Visit Summary   1/11/2018    Jacques Sharp    MRN: [id number]           Patient Information     Date Of Birth          1955        Visit Information        Provider Department      1/11/2018 9:00 AM Niurka Frederick PA-C [facility]        Today's Diagnoses     Fatigue, unspecified type    -  1    Coronary artery disease involving native heart without angina pectoris, unspecified vessel or lesion type        Paroxysmal supraventricular tachycardia (H)        Hyperlipidemia LDL goal <70          Care Instructions    Thank you for your  Heart Care visit today. Your provider has recommended the following:  Medication Changes:  No changes. I sent refills to your pharmacy, but told them to hold them until you need refills.  Recommendations:  Please have fasting labs (fast for ~10 hours prior) at your local [facility] lab at your convenience in the next 1-2 months  I did place an order for a sleep study.   Follow-up:  See Dr Caballero for cardiology follow up in 1 years.  We kindly ask that you call cardiology scheduling at [phone] three months prior to requested revisit date to schedule future cardiology appointments.  Reminder:  1. Please bring in your current medication list or your medication, over the counter supplements and vitamin bottles as we will review these at each office visit.               [facility]~[address]~[place], [place]~[address]  Questions about your visit today?  Call your Cardiology Clinic RN's-Danna Galindo and/or Coral Wallace at [phone].                Follow-ups after your visit        Additional Services     SLEEP EVALUATION & MANAGEMENT REFERRAL - [facility]  [phone] (Age 2 and up)       Please be aware that coverage of these services is subject to the terms and limitations of your health insurance plan.  Call member services  at your health plan with any benefit or coverage questions.      Please bring the following to your appointment:    >>   List of current medications   >>   This referral request   >>   Any documents/labs given to you for this referral                Follow-Up with Cardiologist                 Your next 10 appointments already scheduled     Yogesh 15, 2018  8:00 AM CST   Ortho Eval with Martha Gallego, PT   [facility] ([facility])    [address] [place] [id number]   [phone]              Future tests that were ordered for you today     Open Future Orders        Priority Expected Expires Ordered    Lipid Profile Routine 1/11/2019 1/11/2019 1/11/2018    ALT Routine 1/11/2019 1/11/2019 1/11/2018    Follow-Up with Cardiologist Routine 1/11/2019 1/12/2019 1/11/2018    SLEEP EVALUATION & MANAGEMENT REFERRAL - ADULT -[facility]  [phone] (Age 2 and up) Routine 1/11/2018 1/11/2020 1/11/2018    Lipid panel reflex to direct LDL Fasting Routine 1/11/2018 1/11/2019 1/11/2018    ALT Routine 1/11/2018 1/11/2019 1/11/2018            Who to contact     If you have questions or need follow up information about today's clinic visit or your schedule please contact [facility] directly at [phone].  Normal or non-critical lab and imaging results will be communicated to you by MyChart, letter or phone within 4 business days after the clinic has received the results. If you do not hear from us within 7 days, please contact the clinic through MyChart or phone. If you have a critical or abnormal lab result, we will notify you by phone as soon as possible.  Submit refill requests through Callision or call your pharmacy and they will forward the refill request to us. Please allow 3 business days for your refill to be completed.          Additional Information About Your Visit        MyChart Information     [facility] lets  "you send messages to your doctor, view your test results, renew your prescriptions, schedule appointments and more. To sign up, go to [email or web address] . Click on \"Log in\" on the left side of the screen, which will take you to the Welcome page. Then click on \"Sign up Now\" on the right side of the page.     You will be asked to enter the access code listed below, as well as some personal information. Please follow the directions to create your username and password.     Your access code is: [email or web address]  Expires: 3/7/2018  2:14 PM     Your access code will  in 90 days. If you need help or a new code, please call your [facility] clinic or [phone].        Care EveryWhere ID     This is your Care EveryWhere ID. This could be used by other organizations to access your [facility] medical records  [id number]        Your Vitals Were     Pulse Pulse Oximetry BMI (Body Mass Index)             65 97% 28.12 kg/m2          Blood Pressure from Last 3 Encounters:   18 121/69   18 109/63   17 109/66    Weight from Last 3 Encounters:   18 76.7 kg (169 lb)   18 74.8 kg (165 lb)   17 (P) 74.8 kg (165 lb)              We Performed the Following     Follow-Up with Cardiac Advanced Practice Provider          Today's Medication Changes          These changes are accurate as of: 18  9:41 AM.  If you have any questions, ask your nurse or doctor.               Stop taking these medicines if you haven't already. Please contact your care team if you have questions.     order for DME   Stopped by:  Niurka Frederick PA-C                Where to get your medicines      These medications were sent to [facility] [address] [address]     Phone:  [phone]     atorvastatin 80 MG tablet    metoprolol 25 MG 24 hr tablet                Primary Care Provider Office Phone # Fax #    Luis Alberto Mcgowan MD " [phone]       [address] [facility] [address]        Equal Access to Services     NOE PEÑA : Hadii aad ku hadnicollephyllis Hardyali, lizethda yameljoseha, carimango pereirahilariagloria greenshannangloria, sudhakar sophiein hayaajonn greenalina sands laJose Luismajor swift. So [facility] [phone].    ATENCIÓN: Si habla español, tiene a nuñez disposición servicios gratuitos de asistencia lingüística. Timothy al [phone].    We comply with applicable federal civil rights laws and [place] laws. We do not discriminate on the basis of race, color, national origin, age, disability, sex, sexual orientation, or gender identity.            Thank you!     Thank you for choosing [facility]  for your care. Our goal is always to provide you with excellent care. Hearing back from our patients is one way we can continue to improve our services. Please take a few minutes to complete the written survey that you may receive in the mail after your visit with us. Thank you!             Your Updated Medication List - Protect others around you: Learn how to safely use, store and throw away your medicines at www.disposemymeds.org.          This list is accurate as of: 1/11/18  9:41 AM.  Always use your most recent med list.                   Brand Name Dispense Instructions for use Diagnosis    acetaminophen 325 MG tablet    TYLENOL    100 tablet    Take 1-2 tablets (325-650 mg) by mouth every 4 hours as needed    S/P coronary angiogram       aspirin 81 MG chewable tablet     90 tablet    Take 1 tablet (81 mg) by mouth daily No further refills until seen by cardiology    Paroxysmal supraventricular tachycardia (H)       atorvastatin 80 MG tablet    LIPITOR    90 tablet    Take 1 tablet (80 mg) by mouth daily    Hyperlipidemia LDL goal <70       metoprolol 25 MG 24 hr tablet    TOPROL-XL    90 tablet    Take 1 tablet (25 mg) by mouth daily    Paroxysmal supraventricular tachycardia (H)       nitroGLYcerin 0.4 MG sublingual tablet     NITROSTAT    60 tablet    Place 1 tablet (0.4 mg) under the tongue every 5 minutes as needed for chest pain    Acute MI

## 2018-01-11 NOTE — PROGRESS NOTES
Please see separate dictation for HPI, PHYSICAL EXAM AND IMPRESSION/PLAN.    CURRENT MEDICATIONS:  Current Outpatient Prescriptions   Medication Sig Dispense Refill     atorvastatin (LIPITOR) 80 MG tablet Take 1 tablet (80 mg) by mouth daily 90 tablet 0     aspirin 81 MG chewable tablet Take 1 tablet (81 mg) by mouth daily No further refills until seen by cardiology 90 tablet 3     metoprolol (TOPROL-XL) 25 MG 24 hr tablet Take 1 tablet (25 mg) by mouth daily 90 tablet 3     acetaminophen (TYLENOL) 325 MG tablet Take 1-2 tablets (325-650 mg) by mouth every 4 hours as needed 100 tablet      nitroglycerin (NITROSTAT) 0.4 MG SL tablet Place 1 tablet (0.4 mg) under the tongue every 5 minutes as needed for chest pain 60 tablet 3       ALLERGIES:   No Known Allergies    PAST MEDICAL HISTORY:  Past Medical History:   Diagnosis Date     Acute MI 12/24/2013     Coronary artery disease      Hyperlipidaemia      Hyperlipidaemia      Paroxysmal supraventricular tachycardia (H)        PAST SURGICAL HISTORY:  Past Surgical History:   Procedure Laterality Date     CORONARY ANGIOGRAPHY ADULT ORDER  1/30/14, 12/24/13     EP ABLATION / EP STUDIES  12/23/13     H ABLATION SVT  12/23/13     LUNG SURGERY      benign cyst removed.      VASECTOMY         SOCIAL HISTORY:  Social History     Social History     Marital status: Single     Spouse name: N/A     Number of children: N/A     Years of education: N/A     Social History Main Topics     Smoking status: Former Smoker     Smokeless tobacco: Never Used      Comment: quit 30+ years ago     Alcohol use No     Drug use: No     Sexual activity: Yes     Partners: Female     Other Topics Concern     None     Social History Narrative       FAMILY HISTORY:  Family History   Problem Relation Age of Onset     Hypertension Mother      CANCER Father      lung     Cancer - colorectal No family hx of      Prostate Cancer No family hx of        Review of Systems:  Skin:  Negative bruising     Eyes:   Positive for glasses    ENT:  Negative      Respiratory:  Negative for shortness of breath;cough     Cardiovascular:  chest pain;Negative for;palpitations;edema;syncope or near-syncope;exercise intolerance fatigue;Positive for;lightheadedness;dizziness    Gastroenterology: Negative for nausea;vomiting;heartburn    Genitourinary:  Negative      Musculoskeletal:  Positive for joint pain    Neurologic:  Negative for headaches;memory problems;stroke;seizures;numbness or tingling of hands;numbness or tingling of feet    Psychiatric:  Negative for anxiety;depression    Heme/Lymph/Imm:  Positive for easy bruising    Endocrine:  Negative for thyroid disorder;diabetes       Reviewed. Remainder of the note dictated.    Niurka Frederick PA-C

## 2018-01-11 NOTE — PROGRESS NOTES
"HISTORY OF PRESENT ILLNESS:  Mr. Sharp is a pleasant 62-year-old gentleman who presents to Cardiology office today for routine followup.      His cardiac history includes SVT and coronary artery disease.  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  Therefore, it was not able to be ablated.  Unfortunately, later that night he developed the sudden onset of severe substernal chest discomfort and was found to have a STEMI.  He underwent coronary angiography and was found to have a thrombotic lesion in the distal RCA.  The RPDA and RPLP did have collaterals from the left system.  He underwent stent placement to the distal RCA at that time.  He was also noted to have a high-grade OM2 lesion.  He did undergo staged PCI with a drug-eluting stent in 01/2014 to this lesion.  Overall, he has done quite well since that time.  He did have a nuclear stress test in 11/2015, which showed a small fixed inferior wall defect.  Gated images showed mild inferior wall hypokinesis.  His EF was calculated at 51% on that test.      Overall, the patient states he has been doing well.  He has been exercising intermittently at [employer] and has not had any cardiovascular limitations.  He does occasionally get short-lasting palpitations but nothing like he previously experienced.  These are quite rare.  The patient does state that when he has occasionally missed his medications, he notices that he is not quite as fatigued during the day.  He does state that he has been known to snore in the past.  His prior significant other did state that he snored frequently and occasionally would \"hold his breath while sleeping.\"  He does note that he does not feel particularly rested when he wakes up.  Several years ago an order for a sleep study was placed, but the patient never followed through on this.      CURRENT CARDIAC " MEDICATIONS:   1.  Toprol-XL 25 mg daily.   2.  Atorvastatin 80 mg daily.   3.  Aspirin 81 mg daily.   4.  Sublingual nitro p.r.n.      The remainder of his medications, allergies and review of systems were reviewed and as are documented separately.      PHYSICAL EXAMINATION:   GENERAL:  The patient is a pleasant 62-year-old gentleman who appears his stated age.  He is in no apparent distress.   VITAL SIGNS:  His blood pressure is 121/69, pulse 65, weight is 169 pounds.   LUNGS:  Clear to auscultation bilaterally.   CARDIAC:  Reveals a regular rate and rhythm, no murmurs appreciated.   ABDOMEN:  Soft, nontender, nondistended.   EXTREMITIES:  Lower extremities show no evidence of edema.   NEUROLOGIC:  Alert and oriented.      ASSESSMENT AND PLAN:  The patient is a pleasant 62-year-old gentleman with a history of SVT with prior AVNRT ablation and known atrial tachycardia that was not able to be ablated.  He also has coronary artery disease with a prior inferior STEMI with stenting to the RCA and subsequent stenting to an OM2 branch of the circumflex.  He overall appears to be doing well from a cardiac standpoint.  He does have some fatigue and as I mentioned above noted that if he forgets to take his morning medications, he thinks he has felt somewhat better.  I did discuss that he could change his metoprolol and atorvastatin to nighttime and see if this helps his symptoms.  Additionally, he mentions that he does not wake up feeling refreshed and has been a heavy snorer with possible apnea in the past.  I did suggest that he be evaluated for sleep apnea and replaced a sleep medicine evaluation order.      I provided refills of his Toprol-XL and atorvastatin to his pharmacy.  Again, he will continue on aspirin 81 mg lifelong.  It has been almost a year since his last lipid profile and I did suggest that he repeat a fasting lipid profile in the next 1-2 months.  We will recheck an ALT at that time as well.      Thank  you for allowing us to participate in the care of this pleasant patient.         GEORGE ASCENCIO PA-C             D: 2018 10:20   T: 2018 11:10   MT: al      Name:     FARHAT HALL   MRN:      [id number]        Account:      [id number]   :      1955           Service Date: 2018      Document: [id number]

## 2018-01-11 NOTE — PATIENT INSTRUCTIONS
Thank you for your [facility] visit today. Your provider has recommended the following:  Medication Changes:  No changes. I sent refills to your pharmacy, but told them to hold them until you need refills.  Recommendations:  Please have fasting labs (fast for ~10 hours prior) at your local [facility] lab at your convenience in the next 1-2 months  I did place an order for a sleep study.   Follow-up:  See Dr Caballero for cardiology follow up in 1 years.  We kindly ask that you call cardiology scheduling at [phone] three months prior to requested revisit date to schedule future cardiology appointments.  Reminder:  1. Please bring in your current medication list or your medication, over the counter supplements and vitamin bottles as we will review these at each office visit.               [facility]  [facility]~[address]~[place], [place]~[address]  Questions about your visit today?  Call your Cardiology Clinic RN's-Danna Galindo and/or Coral Wallace at [phone].

## 2018-01-11 NOTE — LETTER
[facility]  [address] [address]  [phone]        January 17, 2019    Jacques Sharp  [address] [place] [address]              Dear Jacques Sharp    This is to remind you that your Fasting Lipid profile and Liver Enzyme is due.    You may call our office at [phone] to schedule an appointment.    Please disregard this notice if you have already had your labs drawn or made an appointment.        Sincerely,        Niurka Frederick PA-C

## 2018-01-16 ENCOUNTER — TELEPHONE (OUTPATIENT)
Dept: FAMILY MEDICINE | Facility: CLINIC | Age: 63
End: 2018-01-16

## 2018-01-16 DIAGNOSIS — M25.532 LEFT WRIST PAIN: Primary | ICD-10-CM

## 2018-01-16 NOTE — TELEPHONE ENCOUNTER
Received call from PT that the order needs to be changed to OT and needs to be signed by Celia Calle RN

## 2018-01-22 ENCOUNTER — HOSPITAL ENCOUNTER (OUTPATIENT)
Dept: OCCUPATIONAL THERAPY | Facility: CLINIC | Age: 63
Setting detail: THERAPIES SERIES
End: 2018-01-22
Attending: PHYSICIAN ASSISTANT
Payer: COMMERCIAL

## 2018-01-22 PROCEDURE — 40000839 ZZH STATISTIC HAND THERAPY VISIT: Performed by: OCCUPATIONAL THERAPIST

## 2018-01-22 PROCEDURE — 97535 SELF CARE MNGMENT TRAINING: CPT | Mod: GO | Performed by: OCCUPATIONAL THERAPIST

## 2018-01-22 PROCEDURE — 97165 OT EVAL LOW COMPLEX 30 MIN: CPT | Mod: GO | Performed by: OCCUPATIONAL THERAPIST

## 2018-01-22 NOTE — PROGRESS NOTES
01/22/18   Hand Therapy Evaluation   Quick Adds   Quick Adds Fall Risk Screen   Splint Fabrication   Minutes of treatment 15 min for self care home management to educate in findings and activity modification relating to - sleeping, using at gym and what activities to avoid. Patient to wear splint full time for 3  weeks and then gradually wean pain free for the next week and gradually add in activity as tolerated.   General Information/History   Start Of Care Date 01/22/18   Referring Physician Celia Lu PA-C   Orders Evaluate And Treat As Indicated   Orders Date 01/17/18   Medical Diagnosis Left Wrist Pain   Additional Occupational Profile Info/Pertinent history of current problem Patient relates that his wrist  has been bothering him since her was lifting with a machine at [employer] and a few days later he has a lot of ulnar sides wrist pain. He had pain at night. He tired ice but that did not help. He says the pain has gotten better. 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   Fall Risk Screen   Fall screen completed by OT   Have you fallen 2 or more times in the past year? No   Have you fallen and had an injury in the past year? No   Is patient a fall risk? No   Pain   Pain Primary Pain Report   Primary Pain Report    Location ulnar wrist   Pain Quality Sharp;Shooting;Aching   Frequency Intermittent   Scale 2/10;8/10   Pain Is Worse At Night   Pain Is Exacerbated By Twisting , Pulling   Pain Is Relieved By Nothing   Progression Since Onset Gradually Improving   Edema   Edema Distal Wrist Crease   Edema Comments no apparent edema   Distal Wrist Crease (measured in cm)   Distal Wrist Crease - - Left 17   Distal Wrist Crease - - Right 17   ROM   ROM AROM   AROM   Comments WNL- pain with supination , wrist flexion and ulnar deviation   Sensation Findings   Sensation Findings Other (see comments)   Sensation Comments No sensory complaints   Strength   Strength Strength;Other (see comments)    Avg - Left 50#- no pain    Avg - Right 58#   Strength Comments No pain with MMT - all planes of wrist and forearm 5/5   Education Assessment   Preferred Learning Style Listening;Reading;Demonstration;Pictures/video   Barriers to Learning No barriers   Therapy Interventions   Planned Therapy Interventions Splinting;Home Program   Clinical Impression   Criteria for Skilled Therapeutic Interventions Met yes   OT Diagnosis Decreased ADL's   Influenced by the following impairments Pain   Assessment of Occupational Performance 1-3 Performance Deficits   Identified Performance Deficits shoveling snow, working out   Clinical Decision Making (Complexity) Low complexity   Therapy Frequency 1x   Predicted Duration of Therapy Intervention (days/wks) 1 week   Risks and Benefits of Treatment have been explained. Yes   Patient, Family & other staff in agreement with plan of care Yes   Clinical Impression Comments Patient fairly asymptomatic today and could not reproduce symptoms on exam. Anticipate patient will benefit from full time splinting, contrast baths and activity modification. This has all been explained in detail to patient . Plan to hold chart open if he is still symptomatic in 3 weeks and reassess at that time.   Hand Goals   Hand Goals  1x/Visit   1x/Visit   Current Functional Task Managing symptoms   Current Performance Level Limited   Goal Target Task Decrease pain to allow increased function  (be independent with home program.)   Goal Target Performance Level No difficulty   Due Date 01/22/18   Date Goal Met 01/22/18   Total Evaluation Time   Kayla Ortega OTR/L CHT  Occupational Therapist, Certified Hand Therapist

## 2018-02-08 DIAGNOSIS — I47.10 PAROXYSMAL SUPRAVENTRICULAR TACHYCARDIA (H): ICD-10-CM

## 2018-02-08 RX ORDER — ASPIRIN 81 MG/1
81 TABLET, CHEWABLE ORAL DAILY
Qty: 90 TABLET | Refills: 3 | Status: SHIPPED | OUTPATIENT
Start: 2018-02-08 | End: 2019-03-27

## 2018-06-26 ENCOUNTER — TELEPHONE (OUTPATIENT)
Dept: FAMILY MEDICINE | Facility: CLINIC | Age: 63
End: 2018-06-26

## 2019-01-19 DIAGNOSIS — E78.5 HYPERLIPIDEMIA LDL GOAL <70: ICD-10-CM

## 2019-01-20 NOTE — TELEPHONE ENCOUNTER
"Requested Prescriptions   Pending Prescriptions Disp Refills     atorvastatin (LIPITOR) 80 MG tablet  Last Written Prescription Date:  1/11/18  Last Fill Quantity: 90,  # refills: 3   Last office visit: 1/11/2018 with prescribing provider:  ABDIRAHMAN Frederick   Future Office Visit:     90 tablet 3     Sig: Take 1 tablet (80 mg) by mouth daily    Statins Protocol Failed - 1/19/2019 11:10 AM       Failed - LDL on file in past 12 months    Recent Labs   Lab Test 03/13/17  1141   LDL 64            Failed - Recent (12 mo) or future (30 days) visit within the authorizing provider's specialty    Patient had office visit in the last 12 months or has a visit in the next 30 days with authorizing provider or within the authorizing provider's specialty.  See \"Patient Info\" tab in inbasket, or \"Choose Columns\" in Meds & Orders section of the refill encounter.             Passed - No abnormal creatine kinase in past 12 months    No lab results found.            Passed - Medication is active on med list       Passed - Patient is age 18 or older          "

## 2019-01-21 RX ORDER — ATORVASTATIN CALCIUM 80 MG/1
80 TABLET, FILM COATED ORAL DAILY
Qty: 90 TABLET | Refills: 0 | Status: SHIPPED | OUTPATIENT
Start: 2019-01-21 | End: 2019-04-23

## 2019-03-27 DIAGNOSIS — I47.10 PAROXYSMAL SUPRAVENTRICULAR TACHYCARDIA (H): ICD-10-CM

## 2019-03-27 RX ORDER — ASPIRIN 81 MG/1
81 TABLET, CHEWABLE ORAL DAILY
Qty: 90 TABLET | Refills: 0 | Status: SHIPPED | OUTPATIENT
Start: 2019-03-27 | End: 2019-07-03

## 2019-04-22 DIAGNOSIS — E78.5 HYPERLIPIDEMIA LDL GOAL <70: ICD-10-CM

## 2019-04-22 NOTE — TELEPHONE ENCOUNTER
"Requested Prescriptions   Pending Prescriptions Disp Refills     atorvastatin (LIPITOR) 80 MG tablet 90 tablet 0     Sig: Take 1 tablet (80 mg) by mouth daily       Statins Protocol Failed - 4/22/2019  3:39 PM        Failed - LDL on file in past 12 months     Recent Labs   Lab Test 03/13/17  1141   LDL 64             Failed - Recent (12 mo) or future (30 days) visit within the authorizing provider's specialty     Patient had office visit in the last 12 months or has a visit in the next 30 days with authorizing provider or within the authorizing provider's specialty.  See \"Patient Info\" tab in inbasket, or \"Choose Columns\" in Meds & Orders section of the refill encounter.              Passed - No abnormal creatine kinase in past 12 months     No lab results found.             Passed - Medication is active on med list        Passed - Patient is age 18 or older        Last Written Prescription Date:  01/21/19  Last Fill Quantity: 90,  # refills: 0   Last office visit: 1/8/2018 with prescribing provider:  01/08/18   Future Office Visit:   Next 5 appointments (look out 90 days)    May 08, 2019  2:00 PM CDT  Return Visit with Alvaro Caballero MD  [facility] ([facility]) [address] [place] [id number] [phone]           "

## 2019-04-23 RX ORDER — ATORVASTATIN CALCIUM 80 MG/1
80 TABLET, FILM COATED ORAL DAILY
Qty: 30 TABLET | Refills: 0 | Status: SHIPPED | OUTPATIENT
Start: 2019-04-23 | End: 2019-05-31

## 2019-05-06 DIAGNOSIS — I25.10 CORONARY ARTERY DISEASE INVOLVING NATIVE CORONARY ARTERY OF NATIVE HEART WITHOUT ANGINA PECTORIS: ICD-10-CM

## 2019-05-06 LAB
ALT SERPL W P-5'-P-CCNC: 33 U/L (ref 0–70)
CHOLEST SERPL-MCNC: 150 MG/DL
HDLC SERPL-MCNC: 47 MG/DL
LDLC SERPL CALC-MCNC: 84 MG/DL
NONHDLC SERPL-MCNC: 103 MG/DL
TRIGL SERPL-MCNC: 94 MG/DL

## 2019-05-06 PROCEDURE — 84460 ALANINE AMINO (ALT) (SGPT): CPT | Performed by: PHYSICIAN ASSISTANT

## 2019-05-06 PROCEDURE — 80061 LIPID PANEL: CPT | Performed by: PHYSICIAN ASSISTANT

## 2019-05-06 PROCEDURE — 36415 COLL VENOUS BLD VENIPUNCTURE: CPT | Performed by: PHYSICIAN ASSISTANT

## 2019-05-08 ENCOUNTER — OFFICE VISIT (OUTPATIENT)
Dept: CARDIOLOGY | Facility: CLINIC | Age: 64
End: 2019-05-08
Payer: COMMERCIAL

## 2019-05-08 ENCOUNTER — HOSPITAL ENCOUNTER (OUTPATIENT)
Dept: CARDIOLOGY | Facility: CLINIC | Age: 64
Discharge: HOME | End: 2019-05-08
Attending: INTERNAL MEDICINE | Admitting: INTERNAL MEDICINE
Payer: COMMERCIAL

## 2019-05-08 VITALS
BODY MASS INDEX: 28.46 KG/M2 | WEIGHT: 171 LBS | HEART RATE: 64 BPM | DIASTOLIC BLOOD PRESSURE: 73 MMHG | SYSTOLIC BLOOD PRESSURE: 113 MMHG | OXYGEN SATURATION: 97 %

## 2019-05-08 DIAGNOSIS — I21.9 ACUTE MI (H): ICD-10-CM

## 2019-05-08 DIAGNOSIS — N52.9 ERECTILE DYSFUNCTION, UNSPECIFIED ERECTILE DYSFUNCTION TYPE: ICD-10-CM

## 2019-05-08 DIAGNOSIS — E78.5 HYPERLIPIDEMIA LDL GOAL <70: Primary | ICD-10-CM

## 2019-05-08 DIAGNOSIS — I47.10 PAROXYSMAL SUPRAVENTRICULAR TACHYCARDIA (H): ICD-10-CM

## 2019-05-08 DIAGNOSIS — I25.10 CORONARY ARTERY DISEASE INVOLVING NATIVE CORONARY ARTERY OF NATIVE HEART WITHOUT ANGINA PECTORIS: Primary | ICD-10-CM

## 2019-05-08 DIAGNOSIS — E78.5 HYPERLIPIDEMIA LDL GOAL <70: ICD-10-CM

## 2019-05-08 PROCEDURE — 93005 ELECTROCARDIOGRAM TRACING: CPT

## 2019-05-08 PROCEDURE — 99214 OFFICE O/P EST MOD 30 MIN: CPT | Performed by: INTERNAL MEDICINE

## 2019-05-08 PROCEDURE — 93010 ELECTROCARDIOGRAM REPORT: CPT | Performed by: INTERNAL MEDICINE

## 2019-05-08 RX ORDER — NEBIVOLOL 2.5 MG/1
2.5 TABLET ORAL DAILY
Qty: 30 TABLET | Refills: 6 | Status: SHIPPED | OUTPATIENT
Start: 2019-05-08 | End: 2019-05-21

## 2019-05-08 RX ORDER — SILDENAFIL 50 MG/1
50 TABLET, FILM COATED ORAL DAILY PRN
Qty: 30 TABLET | Refills: 1 | Status: SHIPPED | OUTPATIENT
Start: 2019-05-08 | End: 2019-05-26 | Stop reason: DRUGHIGH

## 2019-05-08 NOTE — PROGRESS NOTES
CARDIOLOGY VISIT    REASON FOR VISIT: SVT, CAD    SUBJECTIVE:  61-year-old male seen for follow-up of SVT and coronary artery disease.  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  He had a postop inferior STEMI with subsequent drug-eluting stent to the RCA and then staged intervention of the OM 2.  He was also noted to have atrial tachycardia originating near the his node and no ablation was pursued.  He has been on beta blocker therapy since then.      Echocardiogram April 2014 showed ejection fraction 50-55% with mild inferior and inferolateral hypokinesis, normal right ventricle, no significant valve disease. Exercise nuclear stress test November 2015 showed 12 minutes 30 seconds on the Roderick protocol, 13 mets, 99% maximum heart rate achieved, no chest pain, small fixed inferior wall defect consistent with infarct, no ischemia, ejection fraction 51% with mild inferior hypokinesis.     Overall he has been doing well recently.  He has not been exercising as much, but denies any exertional shortness of breath or chest pain.  He has not really checked his blood pressure much at home.  He has questions about erectile dysfunction.    MEDICATIONS:  Current Outpatient Medications   Medication     acetaminophen (TYLENOL) 325 MG tablet     aspirin (ASA) 81 MG chewable tablet     atorvastatin (LIPITOR) 80 MG tablet     metoprolol (TOPROL-XL) 25 MG 24 hr tablet     nitroglycerin (NITROSTAT) 0.4 MG SL tablet     No current facility-administered medications for this visit.        ALLERGIES:  No Known Allergies    REVIEW OF SYSTEMS:  Constitutional:  No weight loss, fever, chills, weakness or fatigue.  HEENT:  Eyes:  No visual loss, blurred vision, double vision or yellow sclerae. No hearing loss, sneezing, congestion, runny nose or sore throat.  Skin:  No rash or itching.  Cardiovascular: per HPI  Respiratory: per HPI  GI:  No anorexia, nausea, vomiting or diarrhea. No abdominal pain or blood.  :   No dysurea, hematuria  Neurologic:  No headache, dizziness, syncope, paralysis, ataxia, numbness or tingling in the extremities. No change in bowel or bladder control.  Musculoskeletal:  No muscle, back pain, joint pain or stiffness.  Hematologic:  No anemia, bleeding or bruising.  Lymphatics:  No enlarged nodes. No history of splenectomy.  Psychiatric:  No history of depression or anxiety.  Endocrine:  No reports of sweating, cold or heat intolerance. No polyuria or polydipsia.  Allergies:  No history of asthma, hives, eczema or rhinitis.    PHYSICAL EXAM:  /73   Pulse 64   Wt 77.6 kg (171 lb)   SpO2 97%   BMI 28.46 kg/m     Constitutional: awake, alert, no distress  Eyes: PERRL, sclera nonicteric  ENT: trachea midline  Respiratory: Lungs clear  Cardiovascular: Regular rate and rhythm, no murmurs  GI: nondistended, nontender, bowel sounds present  Lymph/Hematologic: no lymphadenopathy  Skin: dry, no rash  Musculoskeletal: good muscle tone, strength 5/5 in upper and lower extremities  Neurologic: no focal deficits  Neuropsychiatric: appropriate affact    DATA:  Lab: May 2019: Cholesterol 150, HDL 47, LDL 84, 2 glycerides 94     EKG: May 8, 2019 sinus rhythm, rate 65, right bundle branch block    ASSESSMENT:  64-year-old male seen for follow-up of SVT and CAD.  He has no recurrent SVT symptoms.  He has no anginal type symptoms.  Lipids and blood pressure are well controlled.    He could be having some erectile dysfunction from the metoprolol.  He will try changing to Bystolic for a few months to see if this makes a difference.  He was also given a prescription for Viagra.  He was told this should not be used within 24 hours of using nitroglycerin.    RECOMMENDATIONS:  1.  History of SVT  -No symptomatic recurrence  -Discontinue metoprolol, start Bystolic 2.5 mg daily    2.  Coronary artery disease  -Continue aspirin statin, change beta-blocker as above  -Encouraged regular exercise  -Lipids at goal    3.   Erectile dysfunction  -Change to Bystolic as above  -Viagra 50 mg as needed    Follow-up in 1 year with FLORENCIA.    Alvaro Caballero MD  Cardiology - [facility]  Pager:  [phone]  Text Page  May 8, 2019

## 2019-05-08 NOTE — LETTER
5/8/2019    Luis Alberto Mcgowan MD  [address] [facility] [address]    RE: Jacques BARRETT Aron       Dear Colleague,    I had the pleasure of seeing Jacques ARNOLD Aron in the [facility].    CARDIOLOGY VISIT    REASON FOR VISIT: SVT, CAD    SUBJECTIVE:  61-year-old male seen for follow-up of SVT and coronary artery disease.  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  He had a postop inferior STEMI with subsequent drug-eluting stent to the RCA and then staged intervention of the OM 2.  He was also noted to have atrial tachycardia originating near the his node and no ablation was pursued.  He has been on beta blocker therapy since then.      Echocardiogram April 2014 showed ejection fraction 50-55% with mild inferior and inferolateral hypokinesis, normal right ventricle, no significant valve disease. Exercise nuclear stress test November 2015 showed 12 minutes 30 seconds on the Roderick protocol, 13 mets, 99% maximum heart rate achieved, no chest pain, small fixed inferior wall defect consistent with infarct, no ischemia, ejection fraction 51% with mild inferior hypokinesis.     Overall he has been doing well recently.  He has not been exercising as much, but denies any exertional shortness of breath or chest pain.  He has not really checked his blood pressure much at home.  He has questions about erectile dysfunction.    MEDICATIONS:  Current Outpatient Medications   Medication     acetaminophen (TYLENOL) 325 MG tablet     aspirin (ASA) 81 MG chewable tablet     atorvastatin (LIPITOR) 80 MG tablet     metoprolol (TOPROL-XL) 25 MG 24 hr tablet     nitroglycerin (NITROSTAT) 0.4 MG SL tablet     No current facility-administered medications for this visit.        ALLERGIES:  No Known Allergies    REVIEW OF SYSTEMS:  Constitutional:  No weight loss, fever, chills, weakness or fatigue.  HEENT:  Eyes:  No visual loss, blurred vision, double vision or yellow sclerae. No hearing  loss, sneezing, congestion, runny nose or sore throat.  Skin:  No rash or itching.  Cardiovascular: per HPI  Respiratory: per HPI  GI:  No anorexia, nausea, vomiting or diarrhea. No abdominal pain or blood.  :  No dysurea, hematuria  Neurologic:  No headache, dizziness, syncope, paralysis, ataxia, numbness or tingling in the extremities. No change in bowel or bladder control.  Musculoskeletal:  No muscle, back pain, joint pain or stiffness.  Hematologic:  No anemia, bleeding or bruising.  Lymphatics:  No enlarged nodes. No history of splenectomy.  Psychiatric:  No history of depression or anxiety.  Endocrine:  No reports of sweating, cold or heat intolerance. No polyuria or polydipsia.  Allergies:  No history of asthma, hives, eczema or rhinitis.    PHYSICAL EXAM:  /73   Pulse 64   Wt 77.6 kg (171 lb)   SpO2 97%   BMI 28.46 kg/m      Constitutional: awake, alert, no distress  Eyes: PERRL, sclera nonicteric  ENT: trachea midline  Respiratory: Lungs clear  Cardiovascular: Regular rate and rhythm, no murmurs  GI: nondistended, nontender, bowel sounds present  Lymph/Hematologic: no lymphadenopathy  Skin: dry, no rash  Musculoskeletal: good muscle tone, strength 5/5 in upper and lower extremities  Neurologic: no focal deficits  Neuropsychiatric: appropriate affact    DATA:  Lab: May 2019: Cholesterol 150, HDL 47, LDL 84, 2 glycerides 94     EKG: May 8, 2019 sinus rhythm, rate 65, right bundle branch block    ASSESSMENT:  64-year-old male seen for follow-up of SVT and CAD.  He has no recurrent SVT symptoms.  He has no anginal type symptoms.  Lipids and blood pressure are well controlled.    He could be having some erectile dysfunction from the metoprolol.  He will try changing to Bystolic for a few months to see if this makes a difference.  He was also given a prescription for Viagra.  He was told this should not be used within 24 hours of using nitroglycerin.    RECOMMENDATIONS:  1.  History of SVT  -No  symptomatic recurrence  -Discontinue metoprolol, start Bystolic 2.5 mg daily    2.  Coronary artery disease  -Continue aspirin statin, change beta-blocker as above  -Encouraged regular exercise  -Lipids at goal    3.  Erectile dysfunction  -Change to Bystolic as above  -Viagra 50 mg as needed    Follow-up in 1 year with FLORENCIA.    Alvaro Caballero MD  Cardiology - [facility]  Pager:  [phone]  Text Page  May 8, 2019      Thank you for allowing me to participate in the care of your patient.    Sincerely,     Alvaro Caballero MD     [facility]

## 2019-05-08 NOTE — PATIENT INSTRUCTIONS
"1. Stop metoprolol    2. Start Bystolic 5mg once daily.    3. Ordered sildenafil (Viagra) 50mg tabs to be taken hour hour before needed.  One refill sent to pharmacy.    [facility]  [facility]~[address]~[place], [place]~[address]  Thank you for your [facility] visit today. If you have questions regarding your visit, please contact your cardiology RN's, Danna Galindo or Coral Wallace, at [phone].      To schedule a future appointment, we kindly ask that you call cardiology scheduling at [phone] three months prior to requested revisit date.  [facility] cardiology clinic is staffed with \"Advance Practice Providers\". These are our cardiology Physician Assistants and Nurse Practitioners.   Please call cardiology scheduling if you feel you need clinical evaluation with them at any time for any cardiac reason.   Reminder:  For your safety, we ask that you bring in your current medication(s) or an updated list of your medications with you to EACH office visit. Include the medication name, dose of pill on bottle and how you are taking it. Include over-the-counter medications or supplements. Your provider will review this at each visit and plan your care based on your current information.   ~~~~~~~~~~~~~~~~~~~~~~~~~~~~~~~~~~~~~~~  \"[facility]\" campus telephone numbers for reference:  Cardiology Scheduling~[phone]  Diagnostic Imaging Scheduling~[phone]  Lab Scheduling~[phone]  Anticoagulation Clinic~[phone]  Cardiac Rehabilitation~[phone]  CORE Clinic RN's~[phone] (at [facility])  [facility] Team [phone]  Cardiology Clinic RN's~[phone] (Coral Wallace, RN & Danna Galindo, RN)  ~~~~~~~~~~~~~~~~~~~~~~~~~~~~~~~~~~~~~~~~        "

## 2019-05-09 ENCOUNTER — TELEPHONE (OUTPATIENT)
Dept: CARDIOLOGY | Facility: CLINIC | Age: 64
End: 2019-05-09

## 2019-05-09 DIAGNOSIS — I47.10 PAROXYSMAL SUPRAVENTRICULAR TACHYCARDIA (H): Primary | ICD-10-CM

## 2019-05-09 NOTE — TELEPHONE ENCOUNTER
Prior Authorization Retail Medication Request    Medication/Dose: Bystolic 2.5mg  ICD code (if different than what is on RX):  -  Previously Tried and Failed:  -  Rationale:  -    Insurance Name:  eulalio briones  Insurance ID:  [id number] ([phone])      Pharmacy Information (if different than what is on RX)  Name:  [facility] [place]  Phone:  [phone]    ASSESSMENT:  b

## 2019-05-10 NOTE — TELEPHONE ENCOUNTER
PA Initiation    Medication: Bystolic 2.5mg -   Insurance Company: [employer] - Phone [phone] Fax [phone]  Pharmacy Filling the Rx: [place], [place] - [address] [place] [address]  Filling Pharmacy Phone: [phone]  Filling Pharmacy Fax: [phone]  Start Date: 5/10/2019

## 2019-05-13 NOTE — TELEPHONE ENCOUNTER
ADDENDUM: Will discuss with Dr Caballero for recommendations. Coral Wallace RN Cardiology May 14, 2019, 11:07 AM      PRIOR AUTHORIZATION DENIED    Medication: Bystolic 2.5mg - DENIED    Denial Date: 5/11/2019    Denial Rational:     Patient must try/fail 3 medications covered by their plan for their condition. Patient has only tried 1, unless patient has a contraindication/health reasons, 2 more must be tried.  Formulary Alternatives:  Acebutolol Capsules  Atenolol Tablets  Betaxolol Tablets  Bisoprolol Tablets  Nadolol Tablets  Propranolol IR Tablets and Propranolol ER Capsules  Sotalol Tablets and Sotalol AF Tablets    Appeal Information:

## 2019-05-14 NOTE — TELEPHONE ENCOUNTER
I would like him to try carvedilol 6.25 mg twice daily for 1 to 2 months.  He can call back with update on his symptoms.    Alvaro Caballero MD  Cardiology - [facility]  Pager: [phone]  Text Page  May 14, 2019    ADDENDUM: LM for patient to call back to discuss. Coral Wallace RN Cardiology May 14, 2019, 11:44 AM   ADDENDUM: Disc recommendation to try carvedilol for 1-2 months and call with an update on his symptoms. Pt is willing to try. Script sent to pharmacy of choice. Coral Wallace RN Cardiology May 21, 2019, 2:29 PM

## 2019-05-21 RX ORDER — CARVEDILOL 6.25 MG/1
6.25 TABLET ORAL 2 TIMES DAILY WITH MEALS
Qty: 60 TABLET | Refills: 3 | Status: SHIPPED | OUTPATIENT
Start: 2019-05-21 | End: 2019-08-12 | Stop reason: ALTCHOICE

## 2019-05-24 ENCOUNTER — TELEPHONE (OUTPATIENT)
Dept: FAMILY MEDICINE | Facility: CLINIC | Age: 64
End: 2019-05-24

## 2019-05-24 DIAGNOSIS — N52.9 ERECTILE DYSFUNCTION, UNSPECIFIED ERECTILE DYSFUNCTION TYPE: ICD-10-CM

## 2019-05-24 RX ORDER — SILDENAFIL 50 MG/1
50 TABLET, FILM COATED ORAL DAILY PRN
Qty: 30 TABLET | Refills: 1 | Status: CANCELLED | OUTPATIENT
Start: 2019-05-24

## 2019-05-24 NOTE — TELEPHONE ENCOUNTER
Cost is to high for the 50mg tablet, please send over script for 20mg sildenafil tablets.    Thank You!  Amy Benavidez  [facility]  P: [phone] F:[phone]

## 2019-05-26 RX ORDER — SILDENAFIL CITRATE 20 MG/1
20 TABLET ORAL DAILY PRN
Qty: 25 TABLET | Refills: 3 | Status: SHIPPED | OUTPATIENT
Start: 2019-05-26 | End: 2021-01-15

## 2019-05-26 NOTE — TELEPHONE ENCOUNTER
OK, will send script for the 20mg tablets.    Alvaro Caballero MD  Cardiology - [facility]  Pager: [phone]  Text Page  May 26, 2019

## 2019-05-31 DIAGNOSIS — E78.5 HYPERLIPIDEMIA LDL GOAL <70: ICD-10-CM

## 2019-05-31 RX ORDER — ATORVASTATIN CALCIUM 80 MG/1
80 TABLET, FILM COATED ORAL DAILY
Qty: 30 TABLET | Refills: 11 | Status: SHIPPED | OUTPATIENT
Start: 2019-05-31 | End: 2019-11-20

## 2019-05-31 NOTE — TELEPHONE ENCOUNTER
"Requested Prescriptions   Pending Prescriptions Disp Refills     atorvastatin (LIPITOR) 80 MG tablet 30 tablet 0     Sig: Take 1 tablet (80 mg) by mouth daily       Statins Protocol Failed - 5/31/2019 11:50 AM        Failed - Recent (12 mo) or future (30 days) visit within the authorizing provider's specialty     Patient had office visit in the last 12 months or has a visit in the next 30 days with authorizing provider or within the authorizing provider's specialty.  See \"Patient Info\" tab in inbasket, or \"Choose Columns\" in Meds & Orders section of the refill encounter.              Passed - LDL on file in past 12 months     Recent Labs   Lab Test 05/06/19  0853   LDL 84             Passed - No abnormal creatine kinase in past 12 months     No lab results found.             Passed - Medication is active on med list        Passed - Patient is age 18 or older      atorvastatin (LIPITOR) 80 MG tablet  Last Written Prescription Date:  04/23/2019  Last Fill Quantity: 30 tablet,  # refills: 0   Last office visit: 1/8/2018 with prescribing provider:  SHAE Lu   Future Office Visit:      Chiquis Dior RT (R) (M)      "

## 2019-05-31 NOTE — TELEPHONE ENCOUNTER
Routing refill request to provider for review/approval because:  Patient needs to be seen because it has been more than 1 year since last office visit.  Lipids were drawn and addressed by cardiology May 6, 2019.    Rx last written by Dr. Mcgowan.  Who should continue ordering this?    Thanks,     Loan WAGNER RN

## 2019-07-03 DIAGNOSIS — I47.10 PAROXYSMAL SUPRAVENTRICULAR TACHYCARDIA (H): ICD-10-CM

## 2019-07-03 RX ORDER — ASPIRIN 81 MG/1
81 TABLET, CHEWABLE ORAL DAILY
Qty: 90 TABLET | Refills: 3 | Status: SHIPPED | OUTPATIENT
Start: 2019-07-03 | End: 2019-12-10

## 2019-07-03 NOTE — TELEPHONE ENCOUNTER
Patient needs refill on asprin and metoprolol 25mg      Thank You!  Amy Benavidez  [facility]  P: [phone] F:[phone]    ADDENDUM: Refill for ASA sent. Pt not on metoprolol, is on carvedilol and has refills of that on file. Coral Wallace RN Cardiology July 3, 2019, 1:51 PM

## 2019-07-11 ENCOUNTER — TELEPHONE (OUTPATIENT)
Dept: CARDIOLOGY | Facility: CLINIC | Age: 64
End: 2019-07-11

## 2019-07-11 NOTE — TELEPHONE ENCOUNTER
Patient wants a refill of Metoprolol er 25mg tablet.    Thank You!  Amy Benavidez  [facility]  P: [phone] F:[phone]    ADDENDUM:  Pt is not on Metoprolol. Refer to Dr. Caballero dictation 5/8/19 and TE's in chart on 5/9/19; 7/3/19 and 7/5/19. Gladis Galindo, RN Cardiology at [facility] July 11, 2019, 12:51 PM

## 2019-08-12 ENCOUNTER — TELEPHONE (OUTPATIENT)
Dept: CARDIOLOGY | Facility: CLINIC | Age: 64
End: 2019-08-12

## 2019-08-12 DIAGNOSIS — I47.10 PAROXYSMAL SUPRAVENTRICULAR TACHYCARDIA (H): Primary | ICD-10-CM

## 2019-08-12 RX ORDER — METOPROLOL SUCCINATE 25 MG/1
25 TABLET, EXTENDED RELEASE ORAL DAILY
Qty: 90 TABLET | Refills: 3 | Status: SHIPPED | OUTPATIENT
Start: 2019-08-12 | End: 2019-09-23

## 2019-08-12 NOTE — TELEPHONE ENCOUNTER
OK to change back to Toprol 25mg qday.    Alvaro Caballero MD  Cardiology - [facility]  Pager: [phone]  Text Page  August 12, 2019    ADDENDUM: Pt notified of ok to switch back to Toprol and stop carvedilol. Script sent to pharmacy. Coral Wallace RN Cardiology August 12, 2019, 3:55 PM

## 2019-08-12 NOTE — TELEPHONE ENCOUNTER
Pt calls in today to state that Dr Caballero had switched him from Toprol to carvedilol (as insurance wouldn't cover bystolic). Notes that it hasn't made the ED any better and he felt better on metoprolol. Would like to switch back to previous dose of 25 mg every day. Dr Caballero off for one week. Pt states he has enough supply to get through to next week. Will discuss with Dr Caballero for recommendations. Coral Wallace RN Cardiology August 12, 2019, 9:45 AM

## 2019-09-17 ENCOUNTER — HOSPITAL ENCOUNTER (INPATIENT)
Facility: CLINIC | Age: 64
LOS: 2 days | Discharge: HOME | End: 2019-09-19
Admitting: INTERNAL MEDICINE
Payer: COMMERCIAL

## 2019-09-17 ENCOUNTER — ALLIED HEALTH/NURSE VISIT (OUTPATIENT)
Dept: FAMILY MEDICINE | Facility: CLINIC | Age: 64
End: 2019-09-17
Payer: COMMERCIAL

## 2019-09-17 VITALS — HEART RATE: 70 BPM | SYSTOLIC BLOOD PRESSURE: 147 MMHG | DIASTOLIC BLOOD PRESSURE: 70 MMHG

## 2019-09-17 DIAGNOSIS — I21.11 ST ELEVATION MYOCARDIAL INFARCTION INVOLVING RIGHT CORONARY ARTERY (H): Primary | ICD-10-CM

## 2019-09-17 DIAGNOSIS — I21.02 ST ELEVATION MYOCARDIAL INFARCTION INVOLVING LEFT ANTERIOR DESCENDING (LAD) CORONARY ARTERY (H): ICD-10-CM

## 2019-09-17 DIAGNOSIS — R07.9 CHEST PAIN: Primary | ICD-10-CM

## 2019-09-17 PROCEDURE — 25000128 H RX IP 250 OP 636: Performed by: INTERNAL MEDICINE

## 2019-09-17 PROCEDURE — 36415 COLL VENOUS BLD VENIPUNCTURE: CPT | Performed by: STUDENT IN AN ORGANIZED HEALTH CARE EDUCATION/TRAINING PROGRAM

## 2019-09-17 PROCEDURE — 20000004 ZZH R&B ICU UMMC

## 2019-09-17 PROCEDURE — C1894 INTRO/SHEATH, NON-LASER: HCPCS | Performed by: INTERNAL MEDICINE

## 2019-09-17 PROCEDURE — 92929 ZZHC PRQ TRLUML CORONARY BM STENT W/ANGIO ADDL ART/BRNCH: CPT | Mod: 59 | Performed by: INTERNAL MEDICINE

## 2019-09-17 PROCEDURE — 99152 MOD SED SAME PHYS/QHP 5/>YRS: CPT | Performed by: INTERNAL MEDICINE

## 2019-09-17 PROCEDURE — C1874 STENT, COATED/COV W/DEL SYS: HCPCS | Performed by: INTERNAL MEDICINE

## 2019-09-17 PROCEDURE — C9601 PERC DRUG-EL COR STENT BRAN: HCPCS | Mod: RC

## 2019-09-17 PROCEDURE — 84100 ASSAY OF PHOSPHORUS: CPT | Performed by: STUDENT IN AN ORGANIZED HEALTH CARE EDUCATION/TRAINING PROGRAM

## 2019-09-17 PROCEDURE — 25000128 H RX IP 250 OP 636: Performed by: STUDENT IN AN ORGANIZED HEALTH CARE EDUCATION/TRAINING PROGRAM

## 2019-09-17 PROCEDURE — 25000132 ZZH RX MED GY IP 250 OP 250 PS 637: Performed by: INTERNAL MEDICINE

## 2019-09-17 PROCEDURE — C9460 INJECTION, CANGRELOR: HCPCS | Performed by: STUDENT IN AN ORGANIZED HEALTH CARE EDUCATION/TRAINING PROGRAM

## 2019-09-17 PROCEDURE — 027135Z DILATION OF CORONARY ARTERY, TWO ARTERIES WITH TWO DRUG-ELUTING INTRALUMINAL DEVICES, PERCUTANEOUS APPROACH: ICD-10-PCS | Performed by: INTERNAL MEDICINE

## 2019-09-17 PROCEDURE — 4A023N7 MEASUREMENT OF CARDIAC SAMPLING AND PRESSURE, LEFT HEART, PERCUTANEOUS APPROACH: ICD-10-PCS | Performed by: INTERNAL MEDICINE

## 2019-09-17 PROCEDURE — 92941 PRQ TRLML REVSC TOT OCCL AMI: CPT | Mod: RC | Performed by: INTERNAL MEDICINE

## 2019-09-17 PROCEDURE — C1760 CLOSURE DEV, VASC: HCPCS | Performed by: INTERNAL MEDICINE

## 2019-09-17 PROCEDURE — 80061 LIPID PANEL: CPT | Performed by: STUDENT IN AN ORGANIZED HEALTH CARE EDUCATION/TRAINING PROGRAM

## 2019-09-17 PROCEDURE — 84484 ASSAY OF TROPONIN QUANT: CPT | Performed by: STUDENT IN AN ORGANIZED HEALTH CARE EDUCATION/TRAINING PROGRAM

## 2019-09-17 PROCEDURE — 83735 ASSAY OF MAGNESIUM: CPT | Performed by: STUDENT IN AN ORGANIZED HEALTH CARE EDUCATION/TRAINING PROGRAM

## 2019-09-17 PROCEDURE — C1769 GUIDE WIRE: HCPCS | Performed by: INTERNAL MEDICINE

## 2019-09-17 PROCEDURE — 27210794 ZZH OR GENERAL SUPPLY STERILE: Performed by: INTERNAL MEDICINE

## 2019-09-17 PROCEDURE — 25000125 ZZHC RX 250: Performed by: INTERNAL MEDICINE

## 2019-09-17 PROCEDURE — 25000132 ZZH RX MED GY IP 250 OP 250 PS 637: Performed by: STUDENT IN AN ORGANIZED HEALTH CARE EDUCATION/TRAINING PROGRAM

## 2019-09-17 PROCEDURE — 25800030 ZZH RX IP 258 OP 636: Performed by: STUDENT IN AN ORGANIZED HEALTH CARE EDUCATION/TRAINING PROGRAM

## 2019-09-17 PROCEDURE — 00000146 ZZHCL STATISTIC GLUCOSE BY METER IP

## 2019-09-17 PROCEDURE — 85347 COAGULATION TIME ACTIVATED: CPT

## 2019-09-17 PROCEDURE — C1887 CATHETER, GUIDING: HCPCS | Performed by: INTERNAL MEDICINE

## 2019-09-17 PROCEDURE — 85027 COMPLETE CBC AUTOMATED: CPT | Performed by: STUDENT IN AN ORGANIZED HEALTH CARE EDUCATION/TRAINING PROGRAM

## 2019-09-17 PROCEDURE — 93454 CORONARY ARTERY ANGIO S&I: CPT | Mod: 26 | Performed by: INTERNAL MEDICINE

## 2019-09-17 PROCEDURE — 92920 PRQ TRLUML C ANGIOP 1ART&/BR: CPT | Performed by: INTERNAL MEDICINE

## 2019-09-17 PROCEDURE — C1725 CATH, TRANSLUMIN NON-LASER: HCPCS | Performed by: INTERNAL MEDICINE

## 2019-09-17 PROCEDURE — 92921 ZZHC PRQ TRLUML CORONARY ANGIOPLASTY ADDL BRANCH: CPT | Mod: RC | Performed by: INTERNAL MEDICINE

## 2019-09-17 PROCEDURE — C1757 CATH, THROMBECTOMY/EMBOLECT: HCPCS | Performed by: INTERNAL MEDICINE

## 2019-09-17 PROCEDURE — 99153 MOD SED SAME PHYS/QHP EA: CPT | Performed by: INTERNAL MEDICINE

## 2019-09-17 PROCEDURE — 99221 1ST HOSP IP/OBS SF/LOW 40: CPT | Mod: GC | Performed by: INTERNAL MEDICINE

## 2019-09-17 PROCEDURE — B2111ZZ FLUOROSCOPY OF MULTIPLE CORONARY ARTERIES USING LOW OSMOLAR CONTRAST: ICD-10-PCS | Performed by: INTERNAL MEDICINE

## 2019-09-17 PROCEDURE — C9606 PERC D-E COR REVASC W AMI S: HCPCS | Performed by: INTERNAL MEDICINE

## 2019-09-17 PROCEDURE — 93454 CORONARY ARTERY ANGIO S&I: CPT | Performed by: INTERNAL MEDICINE

## 2019-09-17 PROCEDURE — 80048 BASIC METABOLIC PNL TOTAL CA: CPT | Performed by: STUDENT IN AN ORGANIZED HEALTH CARE EDUCATION/TRAINING PROGRAM

## 2019-09-17 PROCEDURE — 99207 ZZC NO CHARGE NURSE ONLY: CPT

## 2019-09-17 DEVICE — STENT SYNERGY DRUG ELUTING 3.50X32MM  H7493926032350: Type: IMPLANTABLE DEVICE | Status: FUNCTIONAL

## 2019-09-17 DEVICE — STENT SYNERGY DRUG ELUTING 3.00X16MM  H7493926016300: Type: IMPLANTABLE DEVICE | Status: FUNCTIONAL

## 2019-09-17 RX ORDER — ARGATROBAN 1 MG/ML
150 INJECTION, SOLUTION INTRAVENOUS
Status: DISCONTINUED | OUTPATIENT
Start: 2019-09-17 | End: 2019-09-17

## 2019-09-17 RX ORDER — EPTIFIBATIDE 2 MG/ML
1 INJECTION, SOLUTION INTRAVENOUS CONTINUOUS PRN
Status: DISCONTINUED | OUTPATIENT
Start: 2019-09-17 | End: 2019-09-17

## 2019-09-17 RX ORDER — EPTIFIBATIDE 2 MG/ML
180 INJECTION, SOLUTION INTRAVENOUS EVERY 10 MIN PRN
Status: DISCONTINUED | OUTPATIENT
Start: 2019-09-17 | End: 2019-09-17

## 2019-09-17 RX ORDER — NITROGLYCERIN 20 MG/100ML
.07-2 INJECTION INTRAVENOUS CONTINUOUS PRN
Status: DISCONTINUED | OUTPATIENT
Start: 2019-09-17 | End: 2019-09-17

## 2019-09-17 RX ORDER — ASPIRIN 81 MG/1
81 TABLET, CHEWABLE ORAL DAILY
Status: DISCONTINUED | OUTPATIENT
Start: 2019-09-18 | End: 2019-09-19 | Stop reason: HOSPADM

## 2019-09-17 RX ORDER — HEPARIN SODIUM 1000 [USP'U]/ML
INJECTION, SOLUTION INTRAVENOUS; SUBCUTANEOUS
Status: DISCONTINUED | OUTPATIENT
Start: 2019-09-17 | End: 2019-09-17

## 2019-09-17 RX ORDER — FENTANYL CITRATE 50 UG/ML
INJECTION, SOLUTION INTRAMUSCULAR; INTRAVENOUS
Status: DISCONTINUED | OUTPATIENT
Start: 2019-09-17 | End: 2019-09-17 | Stop reason: HOSPADM

## 2019-09-17 RX ORDER — NOREPINEPHRINE BITARTRATE 0.06 MG/ML
.03-.4 INJECTION, SOLUTION INTRAVENOUS CONTINUOUS PRN
Status: DISCONTINUED | OUTPATIENT
Start: 2019-09-17 | End: 2019-09-17

## 2019-09-17 RX ORDER — ACETAMINOPHEN 325 MG/1
650 TABLET ORAL EVERY 4 HOURS PRN
Status: DISCONTINUED | OUTPATIENT
Start: 2019-09-17 | End: 2019-09-19 | Stop reason: HOSPADM

## 2019-09-17 RX ORDER — ALUMINA, MAGNESIA, AND SIMETHICONE 2400; 2400; 240 MG/30ML; MG/30ML; MG/30ML
30 SUSPENSION ORAL EVERY 4 HOURS PRN
Status: DISCONTINUED | OUTPATIENT
Start: 2019-09-17 | End: 2019-09-19 | Stop reason: HOSPADM

## 2019-09-17 RX ORDER — ARGATROBAN 1 MG/ML
350 INJECTION, SOLUTION INTRAVENOUS
Status: DISCONTINUED | OUTPATIENT
Start: 2019-09-17 | End: 2019-09-17

## 2019-09-17 RX ORDER — PHENYLEPHRINE HCL IN 0.9% NACL 50MG/250ML
.5-6 PLASTIC BAG, INJECTION (ML) INTRAVENOUS CONTINUOUS PRN
Status: DISCONTINUED | OUTPATIENT
Start: 2019-09-17 | End: 2019-09-17

## 2019-09-17 RX ORDER — NITROGLYCERIN 5 MG/ML
VIAL (ML) INTRAVENOUS
Status: DISCONTINUED | OUTPATIENT
Start: 2019-09-17 | End: 2019-09-17 | Stop reason: HOSPADM

## 2019-09-17 RX ORDER — HYDRALAZINE HYDROCHLORIDE 25 MG/1
25 TABLET, FILM COATED ORAL ONCE
Status: COMPLETED | OUTPATIENT
Start: 2019-09-17 | End: 2019-09-17

## 2019-09-17 RX ORDER — HEPARIN SODIUM 5000 [USP'U]/.5ML
5000 INJECTION, SOLUTION INTRAVENOUS; SUBCUTANEOUS EVERY 12 HOURS
Status: DISCONTINUED | OUTPATIENT
Start: 2019-09-17 | End: 2019-09-19 | Stop reason: HOSPADM

## 2019-09-17 RX ORDER — ATORVASTATIN CALCIUM 80 MG/1
80 TABLET, FILM COATED ORAL EVERY EVENING
Status: DISCONTINUED | OUTPATIENT
Start: 2019-09-17 | End: 2019-09-19 | Stop reason: HOSPADM

## 2019-09-17 RX ORDER — IOPAMIDOL 755 MG/ML
INJECTION, SOLUTION INTRAVASCULAR
Status: DISCONTINUED | OUTPATIENT
Start: 2019-09-17 | End: 2019-09-17 | Stop reason: HOSPADM

## 2019-09-17 RX ORDER — DOPAMINE HYDROCHLORIDE 160 MG/100ML
2-20 INJECTION, SOLUTION INTRAVENOUS CONTINUOUS PRN
Status: DISCONTINUED | OUTPATIENT
Start: 2019-09-17 | End: 2019-09-17

## 2019-09-17 RX ORDER — LIDOCAINE 40 MG/G
CREAM TOPICAL
Status: DISCONTINUED | OUTPATIENT
Start: 2019-09-17 | End: 2019-09-19 | Stop reason: HOSPADM

## 2019-09-17 RX ORDER — ACETAMINOPHEN 650 MG/1
650 SUPPOSITORY RECTAL EVERY 4 HOURS PRN
Status: DISCONTINUED | OUTPATIENT
Start: 2019-09-17 | End: 2019-09-19 | Stop reason: HOSPADM

## 2019-09-17 RX ORDER — DOBUTAMINE HYDROCHLORIDE 200 MG/100ML
2-20 INJECTION INTRAVENOUS CONTINUOUS PRN
Status: DISCONTINUED | OUTPATIENT
Start: 2019-09-17 | End: 2019-09-17

## 2019-09-17 RX ORDER — ATROPINE SULFATE 0.1 MG/ML
INJECTION INTRAVENOUS
Status: DISCONTINUED | OUTPATIENT
Start: 2019-09-17 | End: 2019-09-17 | Stop reason: HOSPADM

## 2019-09-17 RX ADMIN — CANGRELOR 4 MCG/KG/MIN: 50 INJECTION, POWDER, LYOPHILIZED, FOR SOLUTION INTRAVENOUS at 17:19

## 2019-09-17 RX ADMIN — ATORVASTATIN CALCIUM 80 MG: 80 TABLET, FILM COATED ORAL at 20:18

## 2019-09-17 RX ADMIN — HYDRALAZINE HYDROCHLORIDE 25 MG: 25 TABLET ORAL at 20:18

## 2019-09-17 ASSESSMENT — ACTIVITIES OF DAILY LIVING (ADL)
TOILETING: 0-->INDEPENDENT
SWALLOWING: 0-->SWALLOWS FOODS/LIQUIDS WITHOUT DIFFICULTY
DRESS: 0-->INDEPENDENT
AMBULATION: 0-->INDEPENDENT
FALL_HISTORY_WITHIN_LAST_SIX_MONTHS: NO
RETIRED_EATING: 0-->INDEPENDENT
ADLS_ACUITY_SCORE: 13
TRANSFERRING: 0-->INDEPENDENT
BATHING: 0-->INDEPENDENT
RETIRED_COMMUNICATION: 0-->UNDERSTANDS/COMMUNICATES WITHOUT DIFFICULTY
COGNITION: 0 - NO COGNITION ISSUES REPORTED

## 2019-09-17 NOTE — Clinical Note
The first balloon was inserted into the right coronary artery and RPLB.Max pressure = 12 petty. Total duration = 10 seconds.     Max pressure = 12 petty. Total duration = 10 seconds.    Balloon reinflated a second time: Max pressure = 12 petty. Total duration = 10 seconds.

## 2019-09-17 NOTE — PROGRESS NOTES
Admitted/transferred from: cath lab at 1820  Reason for admission/transfer: further monitoring - bradycardia and frequent PVCs during procedure  Patient status upon admission/transfer: vss, a/o, room air  Interventions/plan: settle in room, diet ordered  2 RN skin assessment: completed by Shari GOLDEN and  Milagro CASTAÑEDA  Result of skin assessment and interventions/actions: R fem site - pea sized knot noted with cath lab  Height, weight, drug calc weight: done  Patient belongings (see Flowsheet - Adult Profile for details): wallet and cell phone with patient; clothes sent with sister  MDRO education (if applicable):

## 2019-09-17 NOTE — Clinical Note
The first balloon was inserted into the right coronary artery.Max pressure = 12 petty. Total duration = 10 seconds.     Max pressure = 14 petty. Total duration = 10 seconds.    Balloon reinflated a second time: Max pressure = 14 petty. Total duration = 10 seconds. Max pressure = 14 petty. Total duration = 10 seconds.  Balloon reinflated a fourth time: Max pressure = 9 petty. Total duration = 10 seconds.

## 2019-09-17 NOTE — Clinical Note
The first balloon was inserted into the right coronary artery.Max pressure = 12 petty. Total duration = 10 seconds.

## 2019-09-17 NOTE — H&P
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 He was cutting wood and had substernal chest pain that started about 2:30 pm he drove himself to the clinic  presented about an hour later on initial notes diaphoretic and pale 911 activated upon arrival. Initial ECG showed STEMI EMS was activated and on arrival had received two doses of SL nitroglycerin and was hypotensive and nauseous s/p zofran he was loaded with aspirin. Initial BP was 120/68 which dropped to 80/50's with nitro. He was transferred here for further care. He was found to have acute thrombotic lesion of the distal RCA s/p TAMIKA to the PDA and mid to distal RCA and angioplasty of the PL he was given 0.3 atropine.     He does not smoke he states he has been fairly active feeling more short of breath lately but no other chest pain has been compliant with his aspirin and his statin.     PTA cardiac meds  Asa 81mg d  Atorvastatin 80mg d  Metoprolol XL 25 d had been changed to nevibolol 2.5mg d due to erectile dysfunction  SL nitroglycerin PRN       Review of Symptoms:     Comprehensive 10 point review of systems was negative unless otherwise noted in the HPI.     Past Medical History:   CAD  AVNRT     Allergies:     Allergies not on file     Outpatient Medications:   Asa  Statin   metoprolol     Family History:   No family history on file.     Social History:     Social History     Tobacco Use     Smoking status: Not on file   Substance Use Topics     Alcohol use: Not on file     Drug use: Not on file       Tobacco Use: Denies using smokeless tobacco and smoking.        Physical Exam:   Blood pressure (!)  138/90, temperature 98.9  F (37.2  C), temperature source Oral, weight 72.7 kg (160 lb 4.4 oz), SpO2 98 %.    Gen: no acute distress  HEENT: no scleral icterus, pupils equal and reactive to light.  NECK: no adenopathy, no asymmetry, JVP not elevated  CARDIOVASCULAR: normal rate, regular rhythm. S1/S2 normal, no murmurs, rubs or gallops   RESPIRATORY: clear to auscultation bilaterally, no rales, rhonchi or wheezes, no use of accessory muscles, no retractions, respirations unlabored   ABDOMEN: soft, non-tender abdomen without rebound or guarding, no hepatosplenomegaly, no palpable masses, bowel sounds present  EXTREMITIES: peripheral pulses normal, no peripheral edema groin site looks clean no bleeding or hematoma  NEURO: oriented to person, place, year, and situation; CN II-XII grossly intact; 5/5 strength throughout; sensation to light touch intact throughout; coordination intact; negative Romberg; gait normal  PSYCH: affect appropriate      Data:   CMPNo lab results found in last 7 days.  CBCNo lab results found in last 7 days.  INRNo lab results found in last 7 days.  Arterial Blood GasNo lab results found in last 7 days.          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    Inferior STEMI  - s/p TAMIKA of the m-d RCA, PDA and angioplasty of the PL  - echo in the am  - atorvastatin 80mg at bedtime  - asa, ticagrelor  - will restart bb in the am will hold off tonight   - lipid panel, A1c    Chronic  - history of SVT- in NSR currently    Nutrition:2g Na diet  DVT ppx: heparin SC  Code Status: Full Code    Patient seen and discussed with Dr Lozada who agrees with the plan detailed above.    Debra Fontaine MD  Cardiology fellow, PGY-6      I have seen  and examined the patient and agree with the finding and plan.       Hoang Lozada MD  Cardiology-[facility]  [phone]

## 2019-09-17 NOTE — NURSING NOTE
Jacques drove himself to the clinic. He was at home cutting down trees outside and developed left sided chest pain. He has had the pain for 30 minutes. He is very diaphoretic and pale. Describes the chest pain as 10/10. 911 activated by the  upon his arrival. He received aspirin 324 mg, oxygen at 2 liters per nc. He did get 2 nitroglycerine tablets SL 5 minutes apart, complained of nausea after nitroglycerin was given . Paramedics are here now and will transport him to [facility]

## 2019-09-18 ENCOUNTER — APPOINTMENT (OUTPATIENT)
Dept: OCCUPATIONAL THERAPY | Facility: CLINIC | Age: 64
End: 2019-09-18
Attending: STUDENT IN AN ORGANIZED HEALTH CARE EDUCATION/TRAINING PROGRAM
Payer: COMMERCIAL

## 2019-09-18 ENCOUNTER — APPOINTMENT (OUTPATIENT)
Dept: CARDIOLOGY | Facility: CLINIC | Age: 64
End: 2019-09-18
Attending: STUDENT IN AN ORGANIZED HEALTH CARE EDUCATION/TRAINING PROGRAM
Payer: COMMERCIAL

## 2019-09-18 LAB
ANION GAP SERPL CALCULATED.3IONS-SCNC: 5 MMOL/L (ref 3–14)
ANION GAP SERPL CALCULATED.3IONS-SCNC: 7 MMOL/L (ref 3–14)
BUN SERPL-MCNC: 16 MG/DL (ref 7–30)
BUN SERPL-MCNC: 18 MG/DL (ref 7–30)
CALCIUM SERPL-MCNC: 8.2 MG/DL (ref 8.5–10.1)
CALCIUM SERPL-MCNC: 8.3 MG/DL (ref 8.5–10.1)
CHLORIDE SERPL-SCNC: 106 MMOL/L (ref 94–109)
CHLORIDE SERPL-SCNC: 107 MMOL/L (ref 94–109)
CHOLEST SERPL-MCNC: 134 MG/DL
CO2 SERPL-SCNC: 25 MMOL/L (ref 20–32)
CO2 SERPL-SCNC: 26 MMOL/L (ref 20–32)
CREAT SERPL-MCNC: 0.88 MG/DL (ref 0.66–1.25)
CREAT SERPL-MCNC: 0.89 MG/DL (ref 0.66–1.25)
ERYTHROCYTE [DISTWIDTH] IN BLOOD BY AUTOMATED COUNT: 13.2 % (ref 10–15)
ERYTHROCYTE [DISTWIDTH] IN BLOOD BY AUTOMATED COUNT: 13.3 % (ref 10–15)
GFR SERPL CREATININE-BSD FRML MDRD: ABNORMAL ML/MIN/{1.73_M2}
GFR SERPL CREATININE-BSD FRML MDRD: ABNORMAL ML/MIN/{1.73_M2}
GLUCOSE BLDC GLUCOMTR-MCNC: 137 MG/DL (ref 70–99)
GLUCOSE SERPL-MCNC: 138 MG/DL (ref 70–99)
GLUCOSE SERPL-MCNC: 141 MG/DL (ref 70–99)
HBA1C MFR BLD: 5.9 % (ref 0–5.6)
HCT VFR BLD AUTO: 41.9 % (ref 40–53)
HCT VFR BLD AUTO: 42.1 % (ref 40–53)
HDLC SERPL-MCNC: 36 MG/DL
HGB BLD-MCNC: 13.6 G/DL (ref 13.3–17.7)
HGB BLD-MCNC: 13.6 G/DL (ref 13.3–17.7)
INTERPRETATION ECG - MUSE: NORMAL
KCT BLD-ACNC: 263 SEC (ref 75–150)
KCT BLD-ACNC: 332 SEC (ref 75–150)
LDLC SERPL CALC-MCNC: 64 MG/DL
MAGNESIUM SERPL-MCNC: 2.1 MG/DL (ref 1.6–2.3)
MCH RBC QN AUTO: 26.1 PG (ref 26.5–33)
MCH RBC QN AUTO: 26.3 PG (ref 26.5–33)
MCHC RBC AUTO-ENTMCNC: 32.3 G/DL (ref 31.5–36.5)
MCHC RBC AUTO-ENTMCNC: 32.5 G/DL (ref 31.5–36.5)
MCV RBC AUTO: 81 FL (ref 78–100)
MCV RBC AUTO: 81 FL (ref 78–100)
NONHDLC SERPL-MCNC: 98 MG/DL
PHOSPHATE SERPL-MCNC: 4.2 MG/DL (ref 2.5–4.5)
PLATELET # BLD AUTO: 201 10E9/L (ref 150–450)
PLATELET # BLD AUTO: 212 10E9/L (ref 150–450)
POTASSIUM SERPL-SCNC: 3.5 MMOL/L (ref 3.4–5.3)
POTASSIUM SERPL-SCNC: 4 MMOL/L (ref 3.4–5.3)
RBC # BLD AUTO: 5.18 10E12/L (ref 4.4–5.9)
RBC # BLD AUTO: 5.21 10E12/L (ref 4.4–5.9)
SODIUM SERPL-SCNC: 137 MMOL/L (ref 133–144)
SODIUM SERPL-SCNC: 140 MMOL/L (ref 133–144)
TRIGL SERPL-MCNC: 170 MG/DL
TROPONIN I SERPL-MCNC: 22.59 UG/L (ref 0–0.04)
TROPONIN I SERPL-MCNC: 42.97 UG/L (ref 0–0.04)
TROPONIN I SERPL-MCNC: 47.36 UG/L (ref 0–0.04)
WBC # BLD AUTO: 10.7 10E9/L (ref 4–11)
WBC # BLD AUTO: 9.9 10E9/L (ref 4–11)

## 2019-09-18 PROCEDURE — 80061 LIPID PANEL: CPT | Performed by: STUDENT IN AN ORGANIZED HEALTH CARE EDUCATION/TRAINING PROGRAM

## 2019-09-18 PROCEDURE — 80048 BASIC METABOLIC PNL TOTAL CA: CPT | Performed by: STUDENT IN AN ORGANIZED HEALTH CARE EDUCATION/TRAINING PROGRAM

## 2019-09-18 PROCEDURE — 25000132 ZZH RX MED GY IP 250 OP 250 PS 637: Performed by: STUDENT IN AN ORGANIZED HEALTH CARE EDUCATION/TRAINING PROGRAM

## 2019-09-18 PROCEDURE — 25000128 H RX IP 250 OP 636: Performed by: STUDENT IN AN ORGANIZED HEALTH CARE EDUCATION/TRAINING PROGRAM

## 2019-09-18 PROCEDURE — 25000132 ZZH RX MED GY IP 250 OP 250 PS 637: Performed by: PHYSICIAN ASSISTANT

## 2019-09-18 PROCEDURE — 83735 ASSAY OF MAGNESIUM: CPT | Performed by: STUDENT IN AN ORGANIZED HEALTH CARE EDUCATION/TRAINING PROGRAM

## 2019-09-18 PROCEDURE — 93306 TTE W/DOPPLER COMPLETE: CPT | Mod: 26 | Performed by: INTERNAL MEDICINE

## 2019-09-18 PROCEDURE — 85027 COMPLETE CBC AUTOMATED: CPT | Performed by: STUDENT IN AN ORGANIZED HEALTH CARE EDUCATION/TRAINING PROGRAM

## 2019-09-18 PROCEDURE — 93010 ELECTROCARDIOGRAM REPORT: CPT | Performed by: INTERNAL MEDICINE

## 2019-09-18 PROCEDURE — 21400000 ZZH R&B CCU UMMC

## 2019-09-18 PROCEDURE — 99232 SBSQ HOSP IP/OBS MODERATE 35: CPT | Mod: 25 | Performed by: INTERNAL MEDICINE

## 2019-09-18 PROCEDURE — 83036 HEMOGLOBIN GLYCOSYLATED A1C: CPT | Performed by: STUDENT IN AN ORGANIZED HEALTH CARE EDUCATION/TRAINING PROGRAM

## 2019-09-18 PROCEDURE — 25500064 ZZH RX 255 OP 636: Performed by: INTERNAL MEDICINE

## 2019-09-18 PROCEDURE — 93005 ELECTROCARDIOGRAM TRACING: CPT

## 2019-09-18 PROCEDURE — 40000264 ECHOCARDIOGRAM COMPLETE

## 2019-09-18 PROCEDURE — 97110 THERAPEUTIC EXERCISES: CPT | Mod: GO | Performed by: OCCUPATIONAL THERAPIST

## 2019-09-18 PROCEDURE — 84484 ASSAY OF TROPONIN QUANT: CPT | Performed by: PHYSICIAN ASSISTANT

## 2019-09-18 PROCEDURE — 97165 OT EVAL LOW COMPLEX 30 MIN: CPT | Mod: GO | Performed by: OCCUPATIONAL THERAPIST

## 2019-09-18 PROCEDURE — 36415 COLL VENOUS BLD VENIPUNCTURE: CPT | Performed by: STUDENT IN AN ORGANIZED HEALTH CARE EDUCATION/TRAINING PROGRAM

## 2019-09-18 RX ORDER — LISINOPRIL 5 MG/1
5 TABLET ORAL DAILY
Status: DISCONTINUED | OUTPATIENT
Start: 2019-09-18 | End: 2019-09-19 | Stop reason: HOSPADM

## 2019-09-18 RX ORDER — POTASSIUM CHLORIDE 1.5 G/1.58G
20-40 POWDER, FOR SOLUTION ORAL
Status: DISCONTINUED | OUTPATIENT
Start: 2019-09-18 | End: 2019-09-19 | Stop reason: HOSPADM

## 2019-09-18 RX ORDER — POTASSIUM CHLORIDE 7.45 MG/ML
10 INJECTION INTRAVENOUS
Status: DISCONTINUED | OUTPATIENT
Start: 2019-09-18 | End: 2019-09-19 | Stop reason: HOSPADM

## 2019-09-18 RX ORDER — POTASSIUM CHLORIDE 29.8 MG/ML
20 INJECTION INTRAVENOUS
Status: DISCONTINUED | OUTPATIENT
Start: 2019-09-18 | End: 2019-09-19 | Stop reason: HOSPADM

## 2019-09-18 RX ORDER — POTASSIUM CHLORIDE 750 MG/1
20-40 TABLET, EXTENDED RELEASE ORAL
Status: DISCONTINUED | OUTPATIENT
Start: 2019-09-18 | End: 2019-09-19 | Stop reason: HOSPADM

## 2019-09-18 RX ORDER — POTASSIUM CL/LIDO/0.9 % NACL 10MEQ/0.1L
10 INTRAVENOUS SOLUTION, PIGGYBACK (ML) INTRAVENOUS
Status: DISCONTINUED | OUTPATIENT
Start: 2019-09-18 | End: 2019-09-19 | Stop reason: HOSPADM

## 2019-09-18 RX ORDER — POTASSIUM CHLORIDE 20MEQ/15ML
40 LIQUID (ML) ORAL ONCE
Status: COMPLETED | OUTPATIENT
Start: 2019-09-18 | End: 2019-09-18

## 2019-09-18 RX ADMIN — ASPIRIN 81 MG CHEWABLE TABLET 81 MG: 81 TABLET CHEWABLE at 08:50

## 2019-09-18 RX ADMIN — ACETAMINOPHEN 650 MG: 325 TABLET, FILM COATED ORAL at 00:43

## 2019-09-18 RX ADMIN — ATORVASTATIN CALCIUM 80 MG: 80 TABLET, FILM COATED ORAL at 20:33

## 2019-09-18 RX ADMIN — Medication 12.5 MG: at 08:50

## 2019-09-18 RX ADMIN — Medication 12.5 MG: at 20:32

## 2019-09-18 RX ADMIN — HEPARIN SODIUM 5000 UNITS: 5000 INJECTION, SOLUTION INTRAVENOUS; SUBCUTANEOUS at 08:50

## 2019-09-18 RX ADMIN — HUMAN ALBUMIN MICROSPHERES AND PERFLUTREN 6 ML: 10; .22 INJECTION, SOLUTION INTRAVENOUS at 10:15

## 2019-09-18 RX ADMIN — HEPARIN SODIUM 5000 UNITS: 5000 INJECTION, SOLUTION INTRAVENOUS; SUBCUTANEOUS at 20:32

## 2019-09-18 RX ADMIN — POTASSIUM CHLORIDE 40 MEQ: 40 SOLUTION ORAL at 05:16

## 2019-09-18 RX ADMIN — TICAGRELOR 90 MG: 90 TABLET ORAL at 20:32

## 2019-09-18 RX ADMIN — LISINOPRIL 5 MG: 2.5 TABLET ORAL at 10:29

## 2019-09-18 ASSESSMENT — ACTIVITIES OF DAILY LIVING (ADL)
ADLS_ACUITY_SCORE: 13
ADLS_ACUITY_SCORE: 13
PREVIOUS_RESPONSIBILITIES: MEAL PREP;HOUSEKEEPING;LAUNDRY;SHOPPING;YARDWORK;DRIVING
ADLS_ACUITY_SCORE: 13

## 2019-09-18 NOTE — PROGRESS NOTES
09/18/19 1600   Quick Adds   Type of Visit Initial Occupational Therapy Evaluation   Living Environment   Lives With alone   Living Arrangements house   Home Accessibility no concerns   Transportation Anticipated car, drives self   Living Environment Comment Pt lives in a 1 level home with flat entry; plans to stay with family for a couple days initially upon discharge where there are 6-8 stairs   Self-Care   Usual Activity Tolerance good   Current Activity Tolerance moderate   Regular Exercise No   Equipment Currently Used at Home none   Activity/Exercise/Self-Care Comment pt was previously independent with all mobility and self cares.  pt reports he participated in OP CR after previous MI approx 5 years ago and exercised regularly for a time; however, has been doing so less frequently of late.     Functional Level   Ambulation 0-->independent   Transferring 0-->independent   Toileting 0-->independent   Bathing 0-->independent   Dressing 0-->independent   Eating 0-->independent   Communication 0-->understands/communicates without difficulty   Cognition 0 - no cognition issues reported   Fall history within last six months no   Which of the above functional risks had a recent onset or change? 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 due to proximity of the circuit to the AVN who presented with an inferior STEMI now s/p PCI to mid to distal RCA and angioplasty to the PL   Precautions/Limitations no known precautions/limitations   Heart Disease Risk Factors Lack of physical activity;Medical  history;Gender   General Observations Pt is pleasant and agreeable; motivated to participate in CR   General Info Comments Activity: ambulate with assist   Cognitive Status Examination   Orientation orientation to person, place and time   Level of Consciousness alert   Follows Commands (Cognition) WNL   Memory intact   Attention No deficits were identified   Cognitive Comment no acute cognitive concerns noted   Visual Perception   Visual Perception Comments pt denies acute visual changes   Sensory Examination   Sensory Quick Adds No deficits were identified   Pain Assessment   Patient Currently in Pain No  (mild discomfort and R groin site)   Range of Motion (ROM)   ROM Quick Adds No deficits were identified   Strength   Strength Comments strength grossly 5/5; will benefit from ongoing cardiac/aerobic exercise intervention after acute MI   Mobility   Bed Mobility Bed mobility skill: Sit to supine;Bed mobility skill: Supine to sit   Bed Mobility Skill: Sit to Supine   Level of [place]: Sit/Supine independent   Bed Mobility Skill: Supine to Sit   Level of [place]: Supine/Sit independent   Transfer Skill: Sit to Stand   Level of [place]: Sit/Stand stand-by assist   Balance   Balance Comments no LOB with functional ambulation and transfer throughout session   Instrumental Activities of Daily Living (IADL)   Previous Responsibilities meal prep;housekeeping;laundry;shopping;yardwork;driving   IADL Comments family/friends able to assist prn   Activities of Daily Living Analysis   ADL Comments decreased activity tolerance after acute cardiac event   General Therapy Interventions   Planned Therapy Interventions ADL retraining;IADL retraining;strengthening;transfer training;home program guidelines;progressive activity/exercise;risk factor education   Clinical Impression   Criteria for Skilled Therapeutic Interventions Met yes, treatment indicated   OT Diagnosis decreased activity tolerance after acute cardiac  event   Influenced by the following impairments deconditioning   Assessment of Occupational Performance 1-3 Performance Deficits   Identified Performance Deficits home management, yardwork, community mobility   Clinical Decision Making (Complexity) Low complexity   Therapy Frequency Daily   Predicted Duration of Therapy Intervention (days/wks) 9/19/19   Anticipated Discharge Disposition Home with Outpatient Therapy   Risks and Benefits of Treatment have been explained. Yes   Patient, Family & other staff in agreement with plan of care Yes   Total Evaluation Time   Total Evaluation Time (Minutes) 5

## 2019-09-18 NOTE — PLAN OF CARE
OT/CR 4C: Evaluation complete and treatment initiated.  Discharge Planner OT   Patient plan for discharge: Home with OP CR  Current status: Pt mobilizing well.  Tolerates ambulating >900ft with VSS on RA.  No s/s of intolerance.  Receptive to education on OP CR and proper grading of activity after acute cardiac event.  Barriers to return to prior living situation: none  Recommendations for discharge: Home with OP CR  Rationale for recommendations: Pt is independent with basic mobility and self cares and safe to discharge to home.  Pt will benefit from OP Phase II CR to progress aerobic endurance and maximize cardiac outcomes.         Entered by: Gianna Wong 09/18/2019 4:30 PM

## 2019-09-18 NOTE — PLAN OF CARE
ICU End of Shift Summary. See flowsheets for vital signs and detailed assessment.    Changes this shift: Pt alert and oriented. R femoral site became more firm when checked at shift change at 1930. MD notified and came to bedside. Applied direct pressure for 10 minutes and pressure dressing applied. Bedrest extended until midnight. Site monitored overnight with no major changes. Hydralazine given once. Pt having more ectopy this morning including 4-5 beat run of vtach. MD notified. Potassium replaced.     Plan:  Continue to monitor and follow plan of care

## 2019-09-18 NOTE — PLAN OF CARE
ICU End of Shift Summary. See flowsheets for vital signs and detailed assessment.    Changes this shift: Patient ambulated today with physical therapy, tolerated well. Patient denied any headache or chest pain this shift. Right femoral site remains unchanged. No additional changes this shift.    Plan: Continue to monitor hemodynamic stability and encourage activity. Plans to transfer once bed becomes available.

## 2019-09-18 NOTE — PROGRESS NOTES
[facility]   Cardiology Consults  Progress Note     Interval History:  - 4 beat run NSVT ON  - EKG with new LAFB  - feels well. Denies chest pain, dyspnea, nausea, headache, vision change    Physical Exam:  Temp:  [98.7  F (37.1  C)-99.3  F (37.4  C)] 98.7  F (37.1  C)  Pulse:  [] 65  Heart Rate:  [] 67  Resp:  [14-18] 16  BP: (118-156)/(65-90) 120/74  SpO2:  [93 %-99 %] 97 %      Intake/Output Summary (Last 24 hours) at 9/18/2019 0812  Last data filed at 9/18/2019 0400  Gross per 24 hour   Intake 360 ml   Output 1425 ml   Net -1065 ml       Wt:   Wt Readings from Last 5 Encounters:   09/18/19 76.4 kg (168 lb 6.9 oz)       GEN: NAD, awake, alert, pleasant  Pulm: CTAB, no wheeze,   Cardiac: JVP WNL, no murmurs  Vascular: no lower extremity edema and palpable pulses  Ext: Right groin swollen and bruised compared to left. No palpable mass or bruit  GI: soft, non distended  Neuro: CN II-XII grossly intact    Medications:    aspirin  81 mg Oral Daily     atorvastatin  80 mg Oral QPM     heparin  5,000 Units Subcutaneous Q12H     metoprolol tartrate  12.5 mg Oral BID     sodium chloride (PF)  3 mL Intracatheter Q8H     ticagrelor  90 mg Oral BID       - MEDICATION INSTRUCTIONS -       - MEDICATION INSTRUCTIONS -         Labs:   CMP  Recent Labs   Lab 09/18/19  0655 09/18/19  0357 09/17/19  2315 09/17/19  2206   NA  --  140  --  137   POTASSIUM  --  3.5  --  4.0   CHLORIDE  --  107  --  106   CO2  --  25  --  26   ANIONGAP  --  7  --  5   GLC  --  141*  --  138*   BUN  --  16  --  18   CR  --  0.88  --  0.89   GFRESTIMATED  --  Not Calculated  --  Not Calculated   GFRESTBLACK  --  Not Calculated  --  Not Calculated   YRIS  --  8.2*  --  8.3*   MAG 2.1 2.1 2.1  --    PHOS  --   --  4.2  --      CBC  Recent Labs   Lab 09/18/19  0655 09/17/19  2206   WBC 9.9 10.7   RBC 5.21 5.18   HGB 13.6 13.6   HCT 42.1 41.9   MCV 81 81   MCH 26.1* 26.3*   MCHC 32.3 32.5   RDW 13.3 13.2     212     INRNo lab results found in last 7 days.  Arterial Blood GasNo lab results found in last 7 days.    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     # Inferior STEMI  Presented to [facility] with chest pain that presented while trimming trees, found to have STEMI. Sent emergently to [facility] cath lab - suspected chest pain for ~3-4 hours. Now s/p TAMIKA of the m-d RCA, PDA and angioplasty of the PL. Trop 47 last PM, subsequent draws not performed. EKG this AM with apparent new LAFB as well as old RBBB.   - echo pending  - PTA atorvastatin 80mg at bedtime  - asa 81mg + ticagrelor 90mg bid for minimum of one year  - will restart bb in the am will hold off tonight   - lipid panel, A1c    #HLD  - atorvastatin as above     # History NSVT   - history of SVT- in NSR currently     Nutrition:2g Na diet  DVT ppx: heparin SC  Code Status: Full Code    Patient seen and discussed with Dr. Hartley, who agrees with above plan.      Jose Buchanan PA-C  [facility] Cardiology Team      ATTENDING NOTE:  Patient has been seen and evaluated by me on 09/18/2019. I have reviewed the documentation above.  I have reviewed today's vital signs, medications, labs, and imaging results.  I have reviewed and edited, as necessary, the history, review of systems, physical examination, and assessment and plan.  I have discussed my assessment and plan with the physician assistant.  I have seen and examined the patient today as part of a shared visit with Juan J Buchanan PA-C.  Jacques Sharp is a 64 year old male with risk factor profile (-) HTN, (-) DM, (+) hypercholesterolemia, (+)   prior tobacco use, (+) fam Hx premature CAD, Hx STEMI RCA (2013) and PCI of OM, presented to [facility] yesterday afternoon with chest pain while chopping wood.  ECG showed inferior ST elevation by report.  He was transported to [facility] and underwent emergent coronary angiography and was found to have occluded RCA at proximal edge of previously placed stent with PCI of the distal RCA and Rt PDA with 2 TAMIKA.  The patient has prior RBBB and developed LAFB but the ST elevation resolved.  Initial troponin was 42.9 and repeat yesterday evening was 47.4.   Patient scheduled for ECHO today.  Exam documented above.  Plan to treat with ASA, Ticagrelor, and resume Lopressor, Atorvastatin.  Initiate ACE-I.    Frank Hartley MD     Cardiovascular Division

## 2019-09-18 NOTE — PROGRESS NOTES
During RN to RN handoff, increase in knot noted under R fem site - MDs notified. Gtt infusion stopped and RN holding pressure for 10 mins. Pulses remain 2+, no c/o pain.

## 2019-09-19 ENCOUNTER — APPOINTMENT (OUTPATIENT)
Dept: OCCUPATIONAL THERAPY | Facility: CLINIC | Age: 64
End: 2019-09-19
Payer: COMMERCIAL

## 2019-09-19 VITALS
WEIGHT: 166 LBS | HEART RATE: 73 BPM | RESPIRATION RATE: 16 BRPM | OXYGEN SATURATION: 97 % | TEMPERATURE: 97.6 F | DIASTOLIC BLOOD PRESSURE: 75 MMHG | SYSTOLIC BLOOD PRESSURE: 106 MMHG

## 2019-09-19 LAB
ANION GAP SERPL CALCULATED.3IONS-SCNC: 5 MMOL/L (ref 3–14)
BUN SERPL-MCNC: 16 MG/DL (ref 7–30)
CALCIUM SERPL-MCNC: 8.7 MG/DL (ref 8.5–10.1)
CHLORIDE SERPL-SCNC: 108 MMOL/L (ref 94–109)
CO2 SERPL-SCNC: 25 MMOL/L (ref 20–32)
CREAT SERPL-MCNC: 0.8 MG/DL (ref 0.66–1.25)
GFR SERPL CREATININE-BSD FRML MDRD: >90 ML/MIN/{1.73_M2}
GLUCOSE SERPL-MCNC: 99 MG/DL (ref 70–99)
POTASSIUM SERPL-SCNC: 4.4 MMOL/L (ref 3.4–5.3)
SODIUM SERPL-SCNC: 138 MMOL/L (ref 133–144)

## 2019-09-19 PROCEDURE — 25000132 ZZH RX MED GY IP 250 OP 250 PS 637: Performed by: STUDENT IN AN ORGANIZED HEALTH CARE EDUCATION/TRAINING PROGRAM

## 2019-09-19 PROCEDURE — 99238 HOSP IP/OBS DSCHRG MGMT 30/<: CPT | Performed by: PHYSICIAN ASSISTANT

## 2019-09-19 PROCEDURE — 25000132 ZZH RX MED GY IP 250 OP 250 PS 637: Performed by: PHYSICIAN ASSISTANT

## 2019-09-19 PROCEDURE — 36415 COLL VENOUS BLD VENIPUNCTURE: CPT | Performed by: STUDENT IN AN ORGANIZED HEALTH CARE EDUCATION/TRAINING PROGRAM

## 2019-09-19 PROCEDURE — 97530 THERAPEUTIC ACTIVITIES: CPT | Mod: GO | Performed by: OCCUPATIONAL THERAPIST

## 2019-09-19 PROCEDURE — 80048 BASIC METABOLIC PNL TOTAL CA: CPT | Performed by: STUDENT IN AN ORGANIZED HEALTH CARE EDUCATION/TRAINING PROGRAM

## 2019-09-19 PROCEDURE — 97110 THERAPEUTIC EXERCISES: CPT | Mod: GO | Performed by: OCCUPATIONAL THERAPIST

## 2019-09-19 RX ORDER — ASPIRIN 81 MG/1
81 TABLET, CHEWABLE ORAL DAILY
Qty: 30 TABLET | Refills: 1 | Status: SHIPPED | OUTPATIENT
Start: 2019-09-20 | End: 2019-09-23

## 2019-09-19 RX ORDER — METOPROLOL TARTRATE 25 MG/1
25 TABLET, FILM COATED ORAL 2 TIMES DAILY
Status: DISCONTINUED | OUTPATIENT
Start: 2019-09-19 | End: 2019-09-19 | Stop reason: HOSPADM

## 2019-09-19 RX ORDER — ATORVASTATIN CALCIUM 80 MG/1
80 TABLET, FILM COATED ORAL EVERY EVENING
Qty: 30 TABLET | Refills: 1 | Status: SHIPPED | OUTPATIENT
Start: 2019-09-19 | End: 2019-09-23

## 2019-09-19 RX ORDER — LISINOPRIL 5 MG/1
5 TABLET ORAL DAILY
Qty: 30 TABLET | Refills: 1 | Status: SHIPPED | OUTPATIENT
Start: 2019-09-20 | End: 2019-09-30

## 2019-09-19 RX ORDER — METOPROLOL TARTRATE 25 MG/1
25 TABLET, FILM COATED ORAL 2 TIMES DAILY
Qty: 60 TABLET | Refills: 1 | Status: SHIPPED | OUTPATIENT
Start: 2019-09-19 | End: 2019-09-30

## 2019-09-19 RX ADMIN — LISINOPRIL 5 MG: 2.5 TABLET ORAL at 09:08

## 2019-09-19 RX ADMIN — Medication 12.5 MG: at 12:18

## 2019-09-19 RX ADMIN — Medication 12.5 MG: at 09:08

## 2019-09-19 RX ADMIN — TICAGRELOR 90 MG: 90 TABLET ORAL at 09:08

## 2019-09-19 RX ADMIN — ASPIRIN 81 MG CHEWABLE TABLET 81 MG: 81 TABLET CHEWABLE at 09:08

## 2019-09-19 ASSESSMENT — ACTIVITIES OF DAILY LIVING (ADL)
ADLS_ACUITY_SCORE: 11
ADLS_ACUITY_SCORE: 13
ADLS_ACUITY_SCORE: 11
ADLS_ACUITY_SCORE: 11

## 2019-09-19 NOTE — PLAN OF CARE
/61 (BP Location: Right arm)   Pulse 73   Temp 97.6  F (36.4  C) (Oral)   Resp 16   Wt 75.3 kg (166 lb)   SpO2 96%   Time: 0381-6587  Status:S/p cath lab and stent placement for STEMI.  Neuro:  A&Ox4, neuro intact. Calm and cooperative with cares.   Vitals: stable on RA and afebrile.   Activity: independent for transfer.   Pain: denied   Cardiac: WNL,NSR, denied chest pain  Respiratory:WNL, RA and LS clear. Deep breathing and coughing encouraged.   GI/: no bm this shift. Active bowel sound. Voiding spontaneously using urinal in the bathroom. Adequate UOP.  Diet: 2 gram Na+  Skin: cath lab site to right groin, dressing intact. Hematoma no change.  LDAs: PIV x 2 saline locked.   New change for this shift: none   Plan: will continue the current POC and will contact attending team for any changes.

## 2019-09-19 NOTE — PLAN OF CARE
OT/CR 6C: Discharge Planner OT   Patient plan for discharge: Home with OP CR at [facility]  Current status: Pt is independent with basic mobility and self cares.  Tolerates 15 minutes ambulation and navigating 12 stairs with stable CV response on RA.  Issued MI CR folder and educated on contents  Barriers to return to prior living situation: None  Recommendations for discharge: home with OP Phase II CR  Rationale for recommendations: Pt is independent and safe to return to home.  Will benefit from OP Phase II CR to progress aerobic conditioning and maximize outcomes after acute cardiac event.    Pt OK'd to ambulate hallways independently.         Entered by: Gianna Wong 09/19/2019 11:15 AM       Cardiac Rehab Discharge Summary    Reason for therapy discharge:    All goals and outcomes met, no further needs identified.    Progress towards therapy goal(s). See goals on Care Plan in [facility] electronic health record for goal details.  Goals met    Therapy recommendation(s):    Recommend continued independent ambulation daily and OP Phase II CR to progress aerobic conditioning and maximize cardiac outcomes following acute cardiac event.

## 2019-09-19 NOTE — PROGRESS NOTES
Transferred to:  412-1 at 22:15  Status at time of transfer: stable  Belongings: with patient at transfer  Chart and medications: chart brought with patient

## 2019-09-19 NOTE — PROGRESS NOTES
Patient transferred from  to 6C room 12 bed 1 at 2220 via wheelchair with his belongings. Patient oriented to room, call light, tv control, light control, communication board, dietary hours and phone number.

## 2019-09-19 NOTE — PROGRESS NOTES
CLINICAL NUTRITION SERVICES    Reason for Assessment:  Request from RN to see regarding heart-healthy nutrition education    Diet History:  Unknown if pt has received heart-healthy nutrition education in the past.      Nutrition Diagnosis:  Unable to assess    Nutrition Prescription/Recs:  Heart-healthy diet.      Interventions:  Nutrition Education:    1. Attempted to provide verbal instruction on a heart-healthy diet. Pt had already discharged.   2. Will send the following handouts to his home address: How to Read Nutrition Labels, My Plate handout, and Heart-Healthy Eating Nutrition Therapy handout from the Nutrition Care Manual.     Goals:    Pt will list at least two interventions to make current meal plan more heart-healthy.     Follow-up:   Patient to ask any further nutrition-related questions before discharge. In addition, pt may request outpatient RD appointment.    Laura Gay, MS, RD, LD, [facility]   6C Pgr: [phone]

## 2019-09-19 NOTE — DISCHARGE SUMMARY
[address] [address]  p: [phone]    Discharge Summary: Cardiology Service    Jacques Sharp MRN# [id number]   YOB: 1955 Age: 64 year old       Admission Date: 9/17/2019  Discharge Date: 09/19/19    Addendum 9/19/2019 1630  Received call from [facility] pharmacy - insurance will not cover ticagrelor prescription, and prior authorization will take several days. No vouchers available for this medication. Ordered loading dose of plavix to start this evening, then 75mg daily starting tomorrow. Patient will be be seen in clinic in about 1 week. Could consider pursuing prior authorization at that time vs. Continuing plavix.       Discharge Diagnoses:  1. Inferior STEMI s/p TAMIKA to RCA 9/17/2019  2. Hyperlipidemia  3. History of PSVT s/p failed ablation  4. History of RCA STEMI 2013      Pertinent Procedures:  1. Coronary angiogram with percutaneous coronary intervention    Consults:  NA    Imaging with results:  Echocardiogram 9/17/2019:  Interpretation Summary  Left ventricular size is normal.  The Ejection Fraction is estimated at 55-60%.  Inferior wall akinesis is present.  Inferolateral (posterior) wall akinesis is present.  Right ventricular function, chamber size, wall motion, and thickness are  normal.  No significant valve disease.  IVC diameter <2.1 cm collapsing >50% with sniff suggests a normal RA pressure  of 3 mmHg.  The peak velocity of the tricuspid regurgitant jet is not obtainable.  Dilatation of aortic root (3.9 cm, 2.2 cm/m2).     There is no prior study for direct comparison.  _______________________________________________________________________  Left Ventricle  Left ventricular size is normal. Left ventricular wall thickness is normal.  Left ventricular diastolic function is indeterminate. The Ejection Fraction is  estimated at 55-60%. Inferior wall akinesis is present. Inferolateral  (posterior) wall akinesis  is present.     Right Ventricle  Right ventricular function, chamber size, wall motion, and thickness are  normal.     Atria  Both atria appear normal.        Mitral Valve  The mitral valve is normal. Trace mitral insufficiency is present.     Aortic Valve  The valve leaflets are not well visualized. Mild aortic valve calcification is  present. Trace aortic insufficiency is present.     Tricuspid Valve  The tricuspid valve is normal. Trace tricuspid insufficiency is present. The  peak velocity of the tricuspid regurgitant jet is not obtainable.     Pulmonic Valve  The pulmonic valve is normal. Trace pulmonic insufficiency is present.     Vessels  The thoracic aorta cannot be assessed. The pulmonary artery and bifurcation  cannot be assessed. The inferior vena cava is normal. Dilatation of aortic  root (3.9 cm, 2.2 cm/m2). IVC diameter <2.1 cm collapsing >50% with sniff  suggests a normal RA pressure of 3 mmHg.     Pericardium  No pericardial effusion is present.        Compared to Previous Study  There is no prior study for direct comparison.    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    Hospital Course by Diagnosis:  # Inferior STEMI  ##History of inferior stemi s/p TAMIKA to RCA 2013  Presented to [facility] with chest pain that started while trimming trees, found to have an inferior STEMI. Sent emergently to [facility] cath lab - chest pain for ~3-4 hours. Now s/p TAMIKA of the m-d RCA, PDA and angioplasty of the PL. Trop peak 47 (although serial measurement not taken, AM trop check down to 22). EKG morning after STEMI with apparent new LAFB as well as old RBBB. Had some ventricular ectopy ON HOD 2  with very short runs of trigeminy and quadgeminy (<6 total PVCs per run). A1C 5.9, HDL low otherwise lipid panel unremarkable.  - echo with preserved LVEF, inferior and inferoseptal akinesis (previously hypokinetic)  - PTA atorvastatin 80mg at bedtime  - asa 81mg + ticagrelor 90mg bid for minimum of one year, and would consider longer period of DAPT given repeat RCA STEMI  - metoprolol tartrate 25mg bid     #Prediabetes  Hgb A1C 5.9. Counseled on management of this condition - will follow up with his PCP.      #HLD  - atorvastatin as above     # History PSVT   History of SVT with attempted ablation 2013. In NSR currently      Condition on discharge  Temp:  [97.6  F (36.4  C)-98.7  F (37.1  C)] 97.8  F (36.6  C)  Pulse:  [60-83] 73  Heart Rate:  [59-82] 66  Resp:  [16-18] 16  BP: (101-126)/(61-89) 107/64  SpO2:  [96 %-99 %] 96 %  General: Alert, interactive, NAD  Eyes: sclera anicteric, EOMI  Neck: JVP WNL, carotid 2+ bilaterally  Cardiovascular: regular rate and rhythm, normal S1 and S2, no murmurs, gallops, or rubs  Resp: clear to auscultation bilaterally, no rales, wheezes, or rhonchi  GI: Soft, nontender, nondistended. +BS.  No HSM or masses, no rebound or guarding.  Extremities: no edema, no cyanosis or clubbing, dorsalis pedis and posterior tibialis pulses 2+ bilaterally. Right groin bruised, mildly tender to palpation. Small non-pulsatile hematoma palpable just medial to the RFA incision site. No bruit  Skin: Warm and dry, no jaundice or rash  Neuro: CN 2-12 intact, moves all extremities equally  Psych: Alert & oriented x 3    Medication Changes:  START Metoprolol tartrate 25mg bid  START Ticagrelor 90mg bid  START Aspirin 81mg daily  START Atorvastatin 80mg daily  START Lisinopril 5mg daily    Discharge medications:   Current Discharge Medication List      START taking these medications    Details   aspirin (ASA) 81 MG chewable tablet Take 1 tablet (81 mg) by mouth daily  Qty: 30 tablet, Refills: 1     Associated Diagnoses: ST elevation myocardial infarction involving right coronary artery (H)      atorvastatin (LIPITOR) 80 MG tablet Take 1 tablet (80 mg) by mouth every evening  Qty: 30 tablet, Refills: 1    Associated Diagnoses: ST elevation myocardial infarction involving right coronary artery (H)      lisinopril (PRINIVIL/ZESTRIL) 5 MG tablet Take 1 tablet (5 mg) by mouth daily  Qty: 30 tablet, Refills: 1    Associated Diagnoses: ST elevation myocardial infarction involving right coronary artery (H)      metoprolol tartrate (LOPRESSOR) 25 MG tablet Take 1 tablet (25 mg) by mouth 2 times daily  Qty: 60 tablet, Refills: 1    Associated Diagnoses: ST elevation myocardial infarction involving right coronary artery (H)      ticagrelor (BRILINTA) 90 MG tablet Take 1 tablet (90 mg) by mouth 2 times daily  Qty: 60 tablet, Refills: 3    Associated Diagnoses: ST elevation myocardial infarction involving right coronary artery (H)             Labs or imaging requiring follow-up after discharge:  NA      Follow-up:  Cardiology clinic in about 2 weeks    Code status:  FULL      Jose Buchanan PA-C  [facility] Cardiology Team    I have seen and examined the patient with the [facility] team. I agree with the assessment and plan of the discharge summary note above.I have reviewed pertinent labs. More than 30 minutes were spent organizing the patient's discharge.    Shaan Scruggs MD  Interventional Cardiology  Pager: [phone]

## 2019-09-23 ENCOUNTER — TELEPHONE (OUTPATIENT)
Dept: FAMILY MEDICINE | Facility: CLINIC | Age: 64
End: 2019-09-23

## 2019-09-23 NOTE — PROGRESS NOTES
Cardiology Clinic Note  September 27, 2019    Chief complaint: STEMI follow-up    HPI:  Jacques Sharp is a 64 year old with a history of AVNRT s/p ablation in 2013 with post-op inferior STEMI s/p TAMIKA to the RCA and staged PCI of OM 2 in 1/2014 who presented to [facility] on 9/17 with substernal chest pain and was found to have an inferior STEMI. He was taken to the cath lab where he was noted to have a thrombotic occlusion of the RCA s/p stenting of the mid-distal RCA and RPDA, as well as POBA of the RPL. His chest pain resolved immediately post-procedure. His highest recorded troponin was 47. His echo showed EF 55-60% with inferior and posterior wall akinesis. He was discharged home on ASA 81 mg, Plavix 75 mg, lisinopril 5 mg, metoprolol 25 mg BID and atorvastatin 80 mg daily.     Since hospital discharge the patient reports feeling well. He denies recurrent substernal chest pressure reminiscent of his MI. He has not experienced any shortness of breath, orthopnea, PND, leg swelling or weight gain. He reports one episode of lightheadedness 2 days ago following cardiac rehab, he rested and symptoms resolved. He denies associated palpitations or syncope. He has gone on a few short bikes rides, maybe 1-2 miles, without exertional cardiac symptoms. His home blood pressures have been ranging 120/70s. He reports making healthier food choices since his heart attack eating more salads, lean meat and egg whites. He denies smoking or alcohol use. He reports a small right groin hematoma, though the bruising and swelling is improving. He is compliant with his medications including ASA and Plavix.     Past medical history:  Past Medical History:   Diagnosis Date     Acute MI (H) 12/24/2013     Coronary artery disease      Hyperlipidaemia      Hyperlipidaemia      Paroxysmal supraventricular tachycardia (H)      Medications:  acetaminophen (TYLENOL) 325 MG tablet, Take 1-2 tablets (325-650 mg) by mouth every 4 hours as needed  aspirin  (ASA) 81 MG chewable tablet, Take 1 tablet (81 mg) by mouth daily  atorvastatin (LIPITOR) 80 MG tablet, Take 1 tablet (80 mg) by mouth daily  lisinopril (PRINIVIL/ZESTRIL) 5 MG tablet, Take 1 tablet (5 mg) by mouth daily  metoprolol tartrate (LOPRESSOR) 25 MG tablet, Take 1 tablet (25 mg) by mouth 2 times daily  nitroglycerin (NITROSTAT) 0.4 MG SL tablet, Place 1 tablet (0.4 mg) under the tongue every 5 minutes as needed for chest pain  sildenafil (REVATIO) 20 MG tablet, Take 1 tablet (20 mg) by mouth daily as needed (erectile dysfunction)  ticagrelor (BRILINTA) 90 MG tablet, Take 1 tablet (90 mg) by mouth 2 times daily    No current facility-administered medications on file prior to visit.       Allergies:      Allergies   Allergen Reactions     Sulfa Drugs        Family and social history:    Family History   Problem Relation Age of Onset     Hypertension Mother      Cancer Father         lung     Cancer - colorectal No family hx of      Prostate Cancer No family hx of        Social History     Socioeconomic History     Marital status: Single     Spouse name: Not on file     Number of children: Not on file     Years of education: Not on file     Highest education level: Not on file   Occupational History     Not on file   Social Needs     Financial resource strain: Not on file     Food insecurity:     Worry: Not on file     Inability: Not on file     Transportation needs:     Medical: Not on file     Non-medical: Not on file   Tobacco Use     Smoking status: Former Smoker     Smokeless tobacco: Never Used     Tobacco comment: quit 30+ years ago   Substance and Sexual Activity     Alcohol use: No     Drug use: No     Sexual activity: Yes     Partners: Female   Lifestyle     Physical activity:     Days per week: Not on file     Minutes per session: Not on file     Stress: Not on file   Relationships     Social connections:     Talks on phone: Not on file     Gets together: Not on file     Attends Worship service:  Not on file     Active member of club or organization: Not on file     Attends meetings of clubs or organizations: Not on file     Relationship status: Not on file     Intimate partner violence:     Fear of current or ex partner: Not on file     Emotionally abused: Not on file     Physically abused: Not on file     Forced sexual activity: Not on file   Other Topics Concern     Parent/sibling w/ CABG, MI or angioplasty before 65F 55M? Not Asked   Social History Narrative     Not on file         Review of Systems:  Skin: No skin rash or ulcers.  Eyes: No red eye.  Ears/Nose/Throat: No ear discharge, nasal congestion, sore throat or dysphagia.  Respiratory: No cough or hemoptysis.  Cardiovascular: See HPI.    Gastrointestinal: No abdominal pain, nausea, vomiting, hematemesis or melena.  Genitourinary: No increased frequency or urgency of urine. No dysuria or hematuria.  Musculoskeletal: No polyarthralgia or myalgias.  Neurologic: No headaches, seizure or focal weakness.  Psychiatric: No hallucinations.  Hematologic/Lymphatic/Immunologic: No bleeding tendency.  Endocrine: No heat or cold intolerance, abnormal facial hair or alopecia.    Vital signs:  There were no vitals taken for this visit.   Wt Readings from Last 2 Encounters:   09/19/19 75.3 kg (166 lb)   05/08/19 77.6 kg (171 lb)       Physical Exam:  Gen: NAD.    HEENT: No conjunctival pallor or scleral icterus, MMM. Clear oropharynx.    Neck: No JVD. No thyroid enlargement or cervical adenopathy.    Chest: Clear to auscultation bilaterally.    CV: Normal first and second heart sounds. No murmurs or gallop appreciated.    Abdomen: Soft, non-tender, non-distended, BS+.  Ext: No edema. Warm and well perfused with normal capillary refill. Right groin ecchymosis - 2x2 hematoma at puncture site, right femoral pulse intact, no bruit  Skin: No skin rash or ulcers.  Neuro: alert, oriented and appropriately conversant.    Psych: Normal affect and speech.    Labs:  Last  Basic Metabolic Panel:  Lab Results   Component Value Date     09/19/2019      Lab Results   Component Value Date    POTASSIUM 4.4 09/19/2019     Lab Results   Component Value Date    CHLORIDE 108 09/19/2019     Lab Results   Component Value Date    YRIS 8.7 09/19/2019     Lab Results   Component Value Date    CO2 25 09/19/2019     Lab Results   Component Value Date    BUN 16 09/19/2019     Lab Results   Component Value Date    CR 0.80 09/19/2019     Lab Results   Component Value Date    GLC 99 09/19/2019       Lab Results   Component Value Date    WBC 9.9 09/18/2019     Lab Results   Component Value Date    RBC 5.21 09/18/2019     Lab Results   Component Value Date    HGB 13.6 09/18/2019     Lab Results   Component Value Date    HCT 42.1 09/18/2019     Lab Results   Component Value Date    MCV 81 09/18/2019     Lab Results   Component Value Date    MCH 26.1 09/18/2019     Lab Results   Component Value Date    MCHC 32.3 09/18/2019     Lab Results   Component Value Date    RDW 13.3 09/18/2019     Lab Results   Component Value Date     09/18/2019       Lab Results   Component Value Date    INR 1.11 01/30/2014    INR 0.96 08/14/2013         Diagnostics:    Coronary angiogram 9/17/19:    65 yo M with a history of CAD s/p TAMIKA to the dRCA and staged intervention to the OM 2 in 2013, AVNRT s/p ablation which was not successful due to proximity of the circuit to the AVN who presented with an inferior STEMI. He was cutting wood and had substernal chest pain that started about 2:30 pm he drove himself to the clinic presented about an hour later on initial notes diaphoretic and pale 911 activated upon arrival. Initial ECG showed STEMI EMS was activated   Pre Procedure Diagnosis     STEMI       Conclusion          Mid RCA to Dist RCA lesion is 100% stenosed.    RPDA lesion is 80% stenosed.    3rd RPL lesion is 60% stenosed.    Prox LAD to Mid LAD lesion is 45% stenosed.    Ost Cx to Prox Cx lesion is 25% stenosed.      Impression and plan: Late late stent thrombosis of the distal RCA.     Successful PCI of the distal RCA, ostial PDA using synergy drug-eluting stent and PTCA of the PL branch     Recommendation: Lifelong dual antiplatelet therapy.          Coronary Findings     Diagnostic   Dominance: Right   Left Anterior Descending   Prox LAD to Mid LAD lesion is 45% stenosed.   Left Circumflex   Ost Cx to Prox Cx lesion is 25% stenosed.   Right Coronary Artery   Mid RCA to Dist RCA lesion is 100% stenosed. Culprit lesion. The lesion is type B2 - medium risk, located at the bend, bifurcated and thrombotic. The lesion was previously treated using a drug-eluting stent. Previous treatment took place >2 years ago. A thrombus was formed within the stent. The lesion has in-stent. The stenosis was measured by a visual reading. The strut appears well apposed.   Right Posterior Descending Artery   RPDA lesion is 80% stenosed. Culprit lesion. The lesion is type B2 - medium risk, located at the bend and thrombotic. The lesion was not previously treated. The stenosis was measured by a visual reading.   Right Posterior Atrioventricular Artery   Third Right Posterolateral Branch   3rd RPL lesion is 60% stenosed. Culprit lesion. The lesion is type B1 - medium risk, located at the bend and thrombotic. The stenosis was measured by a visual reading.   Intervention     Mid RCA to Dist RCA lesion   Stent   Lesion length: 30 mm. CATH GUIDING COR LNCHR OD6FR L100CM guide catheter was successful. The guidewire guidewire crosses lesion There is no pre-interventional antegrade distal flow (OSCAR 0). A STENT SYNERGY DRUG ELUTING 3.37E03YX [id number] drug eluting stent was successfully placed. Pre-stent angioplasty was performed using a CATH BALLOON EMERGE 2.5X12MM [id number] supply. Maximum pressure: 12 petty. Inflation time: 30 sec. . Minimum lumen area: 3.5 mm . The strut is apposed. No post-stent angioplasty was performed. The post-interventional  distal flow is normal (OSCAR 3).   There is a 0% residual stenosis post intervention.   RPDA lesion   Stent   Lesion length: 12 mm. CATH GUIDING COR LNCHR OD6FR L100CM guide catheter was successful. The guidewire guidewire crosses lesion There is no pre-interventional antegrade distal flow (OSCAR 0). A STENT SYNERGY DRUG ELUTING 3.49P75DI [id number] drug eluting stent was successfully placed. No pre-stent angioplasty was performed. Minimum lumen area: 3 mm . The post-interventional distal flow is normal (OSCAR 3). The intervention was successful. No complications occurred at this lesion.   There is a 0% residual stenosis post intervention.   3rd RPL lesion   Angioplasty   Angioplasty using a standard balloon was performed independent of stent deployment. CATH GUIDING COR LNCHR OD6FR L100CM guide catheter was successful. The balloon used was a CATH BALLOON EMERGE 2.5X12MM [id number]. There is no pre-interventional antegrade distal flow (OSCAR 0). The post-interventional distal flow is normal (OSCAR 3).   There is a 0% residual stenosis post intervention.     Echocardiogram 9/17/2019:  Interpretation Summary  Left ventricular size is normal.  The Ejection Fraction is estimated at 55-60%.  Inferior wall akinesis is present.  Inferolateral (posterior) wall akinesis is present.  Right ventricular function, chamber size, wall motion, and thickness are  normal.  No significant valve disease.  IVC diameter <2.1 cm collapsing >50% with sniff suggests a normal RA pressure  of 3 mmHg.  The peak velocity of the tricuspid regurgitant jet is not obtainable.  Dilatation of aortic root (3.9 cm, 2.2 cm/m2).     There is no prior study for direct comparison.    Assessment and Plan:     1. CAD: Hx of CAD w/inferior STEMI in 2013 s/p TAMIKA to the dRCA and staged intervention to the OM 2. Presented to [facility] 9/17/19 with chest discomfort, found to have inferior STEMI s/p TAMIKA to the mid-distal RCA, RPDA and POBA of RPL. Highest troponin  47. Echo with EF 55-60% with inferior and posterior wall akinesis. Denies recurrent angina. Compliant with DAPT. Right groin hematoma resolving.   - Continue ASA 81 mg lifelong  - Continue Plavix 75 my daily lifelong as tolerated  - Continue BB, ACEi, high intensity statin  - Would wait 6 months post-MI to interrupt Plavix for foot surgery    2. HLD: At goal, last LDL 64 in September 2019.  - Continue atorvastatin 80 mg daily  - Repeat fasting lipids in 1 year    3. AVNRT: s/p ablation. Denies recurrent palpitations  - Continue beta blocker therapy    4. Erectile dysfunction:   - Advised patient to wait 3 months post STEMI to resume Viagra  - Reminded patient that it can not be used with Nitroglycerin    Follow-up: See Dr. Caballero in 3 months in [place].

## 2019-09-23 NOTE — TELEPHONE ENCOUNTER
ED/UC/IP follow up phone call: [facility] discharge 9/19/19  ST Elevation Myocardial infarction involving left anterior descending coronary artery, ST elevation myocardial     RN please call to follow up.    Number of ED visits in past 12 months = 0

## 2019-09-23 NOTE — TELEPHONE ENCOUNTER
"Hospital/TCU/ED for chronic condition Discharge Protocol    \"Hi, my name is Tiesha Velázquez RN, a registered nurse, and I am calling from [facility].  I am calling to follow up and see how things are going for you after your recent emergency visit/hospital/TCU stay.\"    Tell me how you are doing now that you are home?\" doing great      Discharge Instructions    \"Let's review your discharge instructions.  What is/are the follow-up recommendations?  Pt. Response: see PCP    \"Has an appointment with your primary care provider been scheduled?\"   Yes. (confirm)    \"When you see the provider, I would recommend that you bring your medications with you.\"    Medications    \"Tell me what changed about your medicines when you discharged?\"    Changes to chronic meds?    0-1    \"What questions do you have about your medications?\"    None     New diagnoses of heart failure, COPD, diabetes, or MI?    No              Post Discharge Medication Reconciliation Status: discharge medications reconciled, continue medications without change.    Was MTM referral placed (*Make sure to put transitions as reason for referral)?   No    Call Summary    \"What questions or concerns do you have about your recent visit and your follow-up care?\"     none    \"If you have questions or things don't continue to improve, we encourage you contact us through the main clinic number (give number).  Even if the clinic is not open, triage nurses are available 24/7 to help you.     We would like you to know that our clinic has extended hours (provide information).  We also have urgent care (provide details on closest location and hours/contact info)\"      \"Thank you for your time and take care!\"           "

## 2019-09-24 ENCOUNTER — HOSPITAL ENCOUNTER (OUTPATIENT)
Dept: CARDIAC REHAB | Facility: CLINIC | Age: 64
End: 2019-09-24
Attending: PHYSICIAN ASSISTANT
Payer: COMMERCIAL

## 2019-09-24 PROCEDURE — 93797 PHYS/QHP OP CAR RHAB WO ECG: CPT

## 2019-09-24 PROCEDURE — 40000575 ZZH STATISTIC OP CARDIAC VISIT #2

## 2019-09-24 PROCEDURE — 40000116 ZZH STATISTIC OP CR VISIT

## 2019-09-24 PROCEDURE — 93798 PHYS/QHP OP CAR RHAB W/ECG: CPT

## 2019-09-25 ENCOUNTER — HOSPITAL ENCOUNTER (OUTPATIENT)
Dept: CARDIAC REHAB | Facility: CLINIC | Age: 64
End: 2019-09-25
Attending: PHYSICIAN ASSISTANT
Payer: COMMERCIAL

## 2019-09-25 PROCEDURE — 40000575 ZZH STATISTIC OP CARDIAC VISIT #2

## 2019-09-25 PROCEDURE — 93797 PHYS/QHP OP CAR RHAB WO ECG: CPT

## 2019-09-25 PROCEDURE — 93798 PHYS/QHP OP CAR RHAB W/ECG: CPT

## 2019-09-25 PROCEDURE — 40000116 ZZH STATISTIC OP CR VISIT

## 2019-09-27 ENCOUNTER — OFFICE VISIT (OUTPATIENT)
Dept: CARDIOLOGY | Facility: CLINIC | Age: 64
End: 2019-09-27
Attending: NURSE PRACTITIONER
Payer: COMMERCIAL

## 2019-09-27 VITALS
HEIGHT: 65 IN | DIASTOLIC BLOOD PRESSURE: 67 MMHG | OXYGEN SATURATION: 96 % | SYSTOLIC BLOOD PRESSURE: 143 MMHG | BODY MASS INDEX: 27.99 KG/M2 | HEART RATE: 59 BPM | WEIGHT: 168 LBS

## 2019-09-27 DIAGNOSIS — I25.2 HISTORY OF ACUTE INFERIOR WALL MI: Primary | ICD-10-CM

## 2019-09-27 DIAGNOSIS — E78.5 HYPERLIPIDEMIA LDL GOAL <70: ICD-10-CM

## 2019-09-27 DIAGNOSIS — I25.10 CORONARY ARTERY DISEASE INVOLVING NATIVE CORONARY ARTERY OF NATIVE HEART WITHOUT ANGINA PECTORIS: ICD-10-CM

## 2019-09-27 PROCEDURE — 99214 OFFICE O/P EST MOD 30 MIN: CPT | Mod: ZP | Performed by: NURSE PRACTITIONER

## 2019-09-27 PROCEDURE — G0463 HOSPITAL OUTPT CLINIC VISIT: HCPCS

## 2019-09-27 PROCEDURE — 93005 ELECTROCARDIOGRAM TRACING: CPT | Mod: ZF

## 2019-09-27 PROCEDURE — 93010 ELECTROCARDIOGRAM REPORT: CPT | Mod: ZP | Performed by: INTERNAL MEDICINE

## 2019-09-27 RX ORDER — CLOPIDOGREL BISULFATE 75 MG/1
75 TABLET ORAL DAILY
COMMUNITY
End: 2019-10-19

## 2019-09-27 ASSESSMENT — PATIENT HEALTH QUESTIONNAIRE - PHQ9: SUM OF ALL RESPONSES TO PHQ QUESTIONS 1-9: 0

## 2019-09-27 ASSESSMENT — MIFFLIN-ST. JEOR: SCORE: 1483.92

## 2019-09-27 ASSESSMENT — PAIN SCALES - GENERAL: PAINLEVEL: NO PAIN (0)

## 2019-09-27 NOTE — PATIENT INSTRUCTIONS
You were seen today in the Cardiovascular Clinic at the [facility].    Cardiology provider you saw during your visit Carlita Mckinney NP.    1. Continue ASA 81 mg and Plavix 75 mg lifelong.  2. Continue lisinopril, metoprolol and statin.  3. Continue checking home BP, call if consistently > 130/80.   4. Continue cardiac rehab, heart healthy diet and daily exercise.  5. Okay to resume Viagra 3 months after your heart attack - 2019.  6. Would wait to hold Plavix for foot surgery until you are 6 months out from your heart attack - 2020.   7. Follow-up with Dr. Caballero in 3 months.     Questions and [phone].   First press #1 for the [employer] and then press #3 for Medical Questions to reach the Cardiology triage nurse.     On Call Cardiologist for after hours or on weekends: [phone], press option #4 and ask to speak to the on-call Cardiologist.

## 2019-09-30 ENCOUNTER — OFFICE VISIT (OUTPATIENT)
Dept: FAMILY MEDICINE | Facility: CLINIC | Age: 64
End: 2019-09-30
Payer: COMMERCIAL

## 2019-09-30 VITALS
WEIGHT: 163.6 LBS | HEIGHT: 64 IN | DIASTOLIC BLOOD PRESSURE: 62 MMHG | RESPIRATION RATE: 20 BRPM | BODY MASS INDEX: 27.93 KG/M2 | SYSTOLIC BLOOD PRESSURE: 116 MMHG | HEART RATE: 60 BPM | TEMPERATURE: 97.6 F

## 2019-09-30 DIAGNOSIS — R30.0 DYSURIA: ICD-10-CM

## 2019-09-30 DIAGNOSIS — N45.1 EPIDIDYMITIS: ICD-10-CM

## 2019-09-30 DIAGNOSIS — I21.3 ST ELEVATION MYOCARDIAL INFARCTION (STEMI), UNSPECIFIED ARTERY (H): Primary | ICD-10-CM

## 2019-09-30 LAB
ALBUMIN UR-MCNC: NEGATIVE MG/DL
APPEARANCE UR: CLEAR
BILIRUB UR QL STRIP: NEGATIVE
COLOR UR AUTO: YELLOW
GLUCOSE UR STRIP-MCNC: NEGATIVE MG/DL
HGB UR QL STRIP: NEGATIVE
INTERPRETATION ECG - MUSE: NORMAL
KETONES UR STRIP-MCNC: NEGATIVE MG/DL
LEUKOCYTE ESTERASE UR QL STRIP: NEGATIVE
NITRATE UR QL: NEGATIVE
PH UR STRIP: 5.5 PH (ref 5–7)
SOURCE: NORMAL
SP GR UR STRIP: 1.02 (ref 1–1.03)
UROBILINOGEN UR STRIP-ACNC: 0.2 EU/DL (ref 0.2–1)

## 2019-09-30 PROCEDURE — 81003 URINALYSIS AUTO W/O SCOPE: CPT | Performed by: PHYSICIAN ASSISTANT

## 2019-09-30 PROCEDURE — 99495 TRANSJ CARE MGMT MOD F2F 14D: CPT | Performed by: PHYSICIAN ASSISTANT

## 2019-09-30 RX ORDER — NITROGLYCERIN 0.4 MG/1
0.4 TABLET SUBLINGUAL EVERY 5 MIN PRN
Qty: 60 TABLET | Refills: 0 | Status: SHIPPED | OUTPATIENT
Start: 2019-09-30 | End: 2023-07-26

## 2019-09-30 RX ORDER — DOXYCYCLINE 100 MG/1
100 TABLET ORAL 2 TIMES DAILY
Qty: 20 TABLET | Refills: 0 | Status: SHIPPED | OUTPATIENT
Start: 2019-09-30 | End: 2019-10-14

## 2019-09-30 RX ORDER — METOPROLOL TARTRATE 25 MG/1
25 TABLET, FILM COATED ORAL 2 TIMES DAILY
Qty: 60 TABLET | Refills: 0 | Status: SHIPPED | OUTPATIENT
Start: 2019-09-30 | End: 2019-12-02

## 2019-09-30 RX ORDER — LEVOFLOXACIN 500 MG/1
500 TABLET, FILM COATED ORAL DAILY
Qty: 10 TABLET | Refills: 0 | Status: CANCELLED | OUTPATIENT
Start: 2019-09-30

## 2019-09-30 RX ORDER — LISINOPRIL 5 MG/1
5 TABLET ORAL DAILY
Qty: 30 TABLET | Refills: 0 | Status: SHIPPED | OUTPATIENT
Start: 2019-09-30 | End: 2019-12-02

## 2019-09-30 ASSESSMENT — PAIN SCALES - GENERAL: PAINLEVEL: NO PAIN (0)

## 2019-09-30 ASSESSMENT — MIFFLIN-ST. JEOR: SCORE: 1448.08

## 2019-09-30 NOTE — PATIENT INSTRUCTIONS
Doxycycline for urinary symptoms  Be seen if symptoms not resolving    Continue with cardiology

## 2019-09-30 NOTE — NURSING NOTE
"Chief Complaint   Patient presents with     Surgical Followup     UTI       Initial /62 (BP Location: Right arm, Patient Position: Sitting, Cuff Size: Adult Regular)   Pulse 60   Temp 97.6  F (36.4  C) (Tympanic)   Resp 20   Ht 1.626 m (5' 4\")   Wt 74.2 kg (163 lb 9.6 oz)   BMI 28.08 kg/m   Estimated body mass index is 28.08 kg/m  as calculated from the following:    Height as of this encounter: 1.626 m (5' 4\").    Weight as of this encounter: 74.2 kg (163 lb 9.6 oz).    Patient presents to the clinic using No DME    Health Maintenance that is potentially due pending provider review:  Colonoscopy/FIT    Pt will schedule colonoscopy  appt.    Is there anyone who you would like to be able to receive your results? No  If yes have patient fill out AYAKA    Sweetie Ney CMA    "

## 2019-09-30 NOTE — PROGRESS NOTES
Subjective     Jacques Sharp is a 64 year old male who presents to clinic today for the following health issues:    [facility]       Hospital Follow-up Visit:    Hospital/Nursing Home/IP Rehab Facility: [facility]  Date of Admission: 09/17/2019  Date of Discharge: 09/19/19  Reason(s) for Admission: Heart attack            Problems taking medications regularly:  None       Medication changes since discharge: None       Problems adhering to non-medication therapy:  None    Summary of hospitalization:  [facility] discharge summary reviewed  Diagnostic Tests/Treatments reviewed.  Follow up needed: none  Other Healthcare Providers Involved in Patient s Care:         cardiology  Update since discharge: improved.     Post Discharge Medication Reconciliation: discharge medications reconciled and changed, per note/orders (see AVS).  Plan of care communicated with patient     Coding guidelines for this visit:  Type of Medical   Decision Making Face-to-Face Visit       within 7 Days of discharge Face-to-Face Visit        within 14 days of discharge   Moderate Complexity [id number]   High Complexity [id number]        Already saw cardiology since discharge.  Asking     Genitourinary symptoms      Duration: Couple weeks     Description:  dysuria and frequency    Intensity:  mild    Accompanying signs and symptoms (fever/discharge/nausea/vomiting/back or abdominal pain):  None    History (frequent UTI's/kidney stones/prostate problems): None  Sexually active: YES    Precipitating or alleviating factors: None    Therapies tried and outcome: none   Outcome: NA  Recently 2-3 times to urinate at night and slightly stingy.  No penile discharge or testicular pain.  No history prostate issues but epidydimitis 2017 with same urinary symptoms but also more testicular pain.  Typically just once a night. Hard to start flow if overly full.   No fam history prostate cancer.      BP Readings from Last 3 Encounters:  "  09/30/19 116/62   09/27/19 (!) 143/67   09/19/19 106/75    Wt Readings from Last 3 Encounters:   09/30/19 74.2 kg (163 lb 9.6 oz)   09/27/19 76.2 kg (168 lb)   09/19/19 75.3 kg (166 lb)        Reviewed and updated as needed this visit by Provider  Tobacco  Allergies  Meds  Problems  Med Hx  Surg Hx  Fam Hx         Review of Systems   ROS COMP: Constitutional, cardiovascular, pulmonary,  systems are negative, except as otherwise noted.      Objective    /62 (BP Location: Right arm, Patient Position: Sitting, Cuff Size: Adult Regular)   Pulse 60   Temp 97.6  F (36.4  C) (Tympanic)   Resp 20   Ht 1.626 m (5' 4\")   Wt 74.2 kg (163 lb 9.6 oz)   BMI 28.08 kg/m    Body mass index is 28.08 kg/m .  Physical Exam   GENERAL: healthy, alert and no distress  RESP: lungs clear to auscultation - no rales, rhonchi or wheezes  CV: regular rate and rhythm, normal S1 S2, no S3 or S4, no murmur, click or rub, no peripheral edema    (male): normal male genitalia without lesions or urethral discharge, R testicle somewhat tender mostly inferior posterior, thickening inferior.  He declined chaperone.  RECTAL (male): normal sphincter tone, no rectal masses, prostate normal size, smooth, nontender without nodules or masses    UA neg          Assessment & Plan       ICD-10-CM    1. ST elevation myocardial infarction (STEMI), unspecified artery (H) I21.3 lisinopril (PRINIVIL/ZESTRIL) 5 MG tablet     metoprolol tartrate (LOPRESSOR) 25 MG tablet     nitroGLYcerin (NITROSTAT) 0.4 MG sublingual tablet   2. Epididymitis N45.1 doxycycline monohydrate (ADOXA) 100 MG tablet   3. Dysuria R30.0 UA reflex to Microscopic and Culture   relative CI for levaquin given recent MI, and questionable history of allergy to sulfa.  Discussed with Dr Mcgowan for doxycycline treatment.   Patient Instructions   Doxycycline for urinary symptoms  Be seen if symptoms not resolving    Continue with cardiology          Return in about 3 months (around " 12/30/2019) for cardiology appt.    Celia Lu PA-C  [facility]

## 2019-10-02 ENCOUNTER — HOSPITAL ENCOUNTER (OUTPATIENT)
Dept: CARDIAC REHAB | Facility: CLINIC | Age: 64
End: 2019-10-02
Attending: PHYSICIAN ASSISTANT
Payer: COMMERCIAL

## 2019-10-02 PROCEDURE — 93797 PHYS/QHP OP CAR RHAB WO ECG: CPT

## 2019-10-02 PROCEDURE — 40000116 ZZH STATISTIC OP CR VISIT

## 2019-10-02 PROCEDURE — 93798 PHYS/QHP OP CAR RHAB W/ECG: CPT

## 2019-10-02 PROCEDURE — 40000575 ZZH STATISTIC OP CARDIAC VISIT #2

## 2019-10-04 ENCOUNTER — HOSPITAL ENCOUNTER (OUTPATIENT)
Dept: CARDIAC REHAB | Facility: CLINIC | Age: 64
End: 2019-10-04
Attending: PHYSICIAN ASSISTANT
Payer: COMMERCIAL

## 2019-10-04 PROCEDURE — 93798 PHYS/QHP OP CAR RHAB W/ECG: CPT | Performed by: REHABILITATION PRACTITIONER

## 2019-10-04 PROCEDURE — 40000116 ZZH STATISTIC OP CR VISIT: Performed by: REHABILITATION PRACTITIONER

## 2019-10-07 ENCOUNTER — HOSPITAL ENCOUNTER (OUTPATIENT)
Dept: CARDIAC REHAB | Facility: CLINIC | Age: 64
End: 2019-10-07
Attending: PHYSICIAN ASSISTANT
Payer: COMMERCIAL

## 2019-10-07 PROCEDURE — 93798 PHYS/QHP OP CAR RHAB W/ECG: CPT

## 2019-10-07 PROCEDURE — 40000116 ZZH STATISTIC OP CR VISIT

## 2019-10-07 PROCEDURE — 93797 PHYS/QHP OP CAR RHAB WO ECG: CPT

## 2019-10-07 PROCEDURE — 40000575 ZZH STATISTIC OP CARDIAC VISIT #2

## 2019-10-09 ENCOUNTER — HOSPITAL ENCOUNTER (OUTPATIENT)
Dept: CARDIAC REHAB | Facility: CLINIC | Age: 64
End: 2019-10-09
Attending: PHYSICIAN ASSISTANT
Payer: COMMERCIAL

## 2019-10-09 PROCEDURE — 40000116 ZZH STATISTIC OP CR VISIT

## 2019-10-09 PROCEDURE — 93798 PHYS/QHP OP CAR RHAB W/ECG: CPT

## 2019-10-14 ENCOUNTER — ALLIED HEALTH/NURSE VISIT (OUTPATIENT)
Dept: FAMILY MEDICINE | Facility: CLINIC | Age: 64
End: 2019-10-14
Payer: COMMERCIAL

## 2019-10-14 ENCOUNTER — HOSPITAL ENCOUNTER (OUTPATIENT)
Dept: ULTRASOUND IMAGING | Facility: CLINIC | Age: 64
Discharge: HOME | End: 2019-10-14
Attending: PHYSICIAN ASSISTANT | Admitting: PHYSICIAN ASSISTANT
Payer: COMMERCIAL

## 2019-10-14 ENCOUNTER — OFFICE VISIT (OUTPATIENT)
Dept: FAMILY MEDICINE | Facility: CLINIC | Age: 64
End: 2019-10-14
Payer: COMMERCIAL

## 2019-10-14 VITALS
HEART RATE: 48 BPM | TEMPERATURE: 97.6 F | BODY MASS INDEX: 28.34 KG/M2 | DIASTOLIC BLOOD PRESSURE: 79 MMHG | WEIGHT: 166 LBS | SYSTOLIC BLOOD PRESSURE: 137 MMHG | HEIGHT: 64 IN | RESPIRATION RATE: 16 BRPM

## 2019-10-14 DIAGNOSIS — N45.1 ACUTE EPIDIDYMITIS: Primary | ICD-10-CM

## 2019-10-14 DIAGNOSIS — Z23 NEED FOR PROPHYLACTIC VACCINATION AND INOCULATION AGAINST INFLUENZA: ICD-10-CM

## 2019-10-14 DIAGNOSIS — R30.0 DYSURIA: ICD-10-CM

## 2019-10-14 DIAGNOSIS — N50.819 TESTICULAR DISCOMFORT: ICD-10-CM

## 2019-10-14 DIAGNOSIS — S61.219A LACERATION OF FINGER: Primary | ICD-10-CM

## 2019-10-14 PROCEDURE — 87591 N.GONORRHOEAE DNA AMP PROB: CPT | Performed by: PHYSICIAN ASSISTANT

## 2019-10-14 PROCEDURE — 99207 ZZC NO CHARGE NURSE ONLY: CPT

## 2019-10-14 PROCEDURE — 90682 RIV4 VACC RECOMBINANT DNA IM: CPT | Performed by: PHYSICIAN ASSISTANT

## 2019-10-14 PROCEDURE — 96372 THER/PROPH/DIAG INJ SC/IM: CPT

## 2019-10-14 PROCEDURE — 93976 VASCULAR STUDY: CPT

## 2019-10-14 PROCEDURE — 87491 CHLMYD TRACH DNA AMP PROBE: CPT | Performed by: PHYSICIAN ASSISTANT

## 2019-10-14 PROCEDURE — 90471 IMMUNIZATION ADMIN: CPT | Performed by: PHYSICIAN ASSISTANT

## 2019-10-14 PROCEDURE — 99214 OFFICE O/P EST MOD 30 MIN: CPT | Mod: 25 | Performed by: PHYSICIAN ASSISTANT

## 2019-10-14 RX ORDER — CEFTRIAXONE SODIUM 250 MG/1
250 INJECTION, POWDER, FOR SOLUTION INTRAMUSCULAR; INTRAVENOUS ONCE
Qty: 2.5 ML | Refills: 0 | OUTPATIENT
Start: 2019-10-14 | End: 2020-03-09

## 2019-10-14 RX ORDER — DOXYCYCLINE 100 MG/1
100 TABLET ORAL 2 TIMES DAILY
Qty: 28 TABLET | Refills: 0 | Status: SHIPPED | OUTPATIENT
Start: 2019-10-14 | End: 2019-11-07

## 2019-10-14 RX ORDER — CEFTRIAXONE SODIUM 250 MG
250 VIAL (EA) INJECTION ONCE
Status: COMPLETED | OUTPATIENT
Start: 2019-10-14 | End: 2019-10-14

## 2019-10-14 RX ADMIN — Medication 250 MG: at 13:37

## 2019-10-14 ASSESSMENT — MIFFLIN-ST. JEOR: SCORE: 1458.97

## 2019-10-14 NOTE — NURSING NOTE
Has laceration on his left hand in the web space between his thumb and first finger. He cut it with metal yesterday afternoon (17 hours ago). Area was washed well with Hibiclens, appears to be healing well, applied bacitracin and non stick dressing, wrapped with magali fabiola. He will mnonitor for signs and symptoms of infection. He is up to date on tetenas

## 2019-10-14 NOTE — PROGRESS NOTES
Clinic Administered Medication Documentation    MEDICATION LIST:   Injectable Medication Documentation    Patient was given Ceftriaxone Sodium (Rocephin). Prior to medication administration, verified patients identity using patient s name and date of birth. Please see MAR and medication order for additional information. Patient instructed to remain in clinic for 15 minutes.      Was entire vial of medication used? Yes  Vial/Syringe: Single dose vial  Expiration Date:  July 2021  Was this medication supplied by the patient? No   Ban Calle RN      1% Liocaine Lot #[id number] Expiration:06/2020  Ban Calle RN

## 2019-10-14 NOTE — NURSING NOTE
"Chief Complaint   Patient presents with     Testicular/scrotal Pain       Initial /79 (BP Location: Right arm, Patient Position: Sitting, Cuff Size: Adult Regular)   Pulse (!) 48   Temp 97.6  F (36.4  C) (Tympanic)   Resp 16   Ht 1.626 m (5' 4\")   Wt 75.3 kg (166 lb)   BMI 28.49 kg/m   Estimated body mass index is 28.49 kg/m  as calculated from the following:    Height as of this encounter: 1.626 m (5' 4\").    Weight as of this encounter: 75.3 kg (166 lb).    Patient presents to the clinic using No DME    Health Maintenance that is potentially due pending provider review:  NONE        Is there anyone who you would like to be able to receive your results? No  If yes have patient fill out AYAKA      "

## 2019-10-14 NOTE — PROGRESS NOTES
"Subjective     Jacques Sharp is a 64 year old male who presents to clinic today for the following health issues:    HPI     * Testicular problem-  Was treated for epididymitis on 9/30/19.  Finished the antibiotic last Thursday and started noticed swelling yesterday.      Finished doxycycline 2 days ago for epididymitis, CI for cipro and sulfa allergy.    Difficult urinating again.    Testicle starting to hurt.    Now stating new sex partner - female.    Tinitis bilaterally x several years, not worsening.    BP Readings from Last 3 Encounters:   10/14/19 137/79   09/30/19 116/62   09/27/19 (!) 143/67    Wt Readings from Last 3 Encounters:   10/14/19 75.3 kg (166 lb)   09/30/19 74.2 kg (163 lb 9.6 oz)   09/27/19 76.2 kg (168 lb)        Reviewed and updated as needed this visit by Provider  Tobacco  Allergies  Meds  Problems  Med Hx  Surg Hx  Fam Hx         Review of Systems   ROS COMP: Constitutional, HEENT, , systems are negative, except as otherwise noted.      Objective    /79 (BP Location: Right arm, Patient Position: Sitting, Cuff Size: Adult Regular)   Pulse (!) 48   Temp 97.6  F (36.4  C) (Tympanic)   Resp 16   Ht 1.626 m (5' 4\")   Wt 75.3 kg (166 lb)   BMI 28.49 kg/m    Body mass index is 28.49 kg/m .  Physical Exam   GENERAL: healthy, alert and no distress   (male): R testicle with cystic feeling mass new since last week and tender here, also still thickened feel of inferior testicle, varicosities, normal male genitalia without lesions or urethral discharge, no hernia.  He declined chaperone  Rectal: deferred today    Testicular US:   FINDINGS: The testicles are normal. There is some asymmetric  prominence of the right epididymis with increased blood flow on color  Doppler. There also is a 0.4 cm cyst in the right epididymal head.  Dilated tubular structures lateral in the right hemiscrotum are seen.  At least some of them were present before. They are consistent with  dilated " varicosities. Its difficult to demonstrate blood flow within  these on color Doppler. This could in part relate to very slow flow.  Component of thrombosis of these varicosities is also possible. Again,  much of this is chronic as it was at least partially imaged before.           Assessment & Plan       ICD-10-CM    1. Acute epididymitis N45.1 UROLOGY ADULT REFERRAL     doxycycline monohydrate (ADOXA) 100 MG tablet     cefTRIAXone (ROCEPHIN) 250 MG injection   2. Testicular discomfort N50.819 Chlamydia trachomatis PCR     Neisseria gonorrhoeae PCR     UROLOGY ADULT REFERRAL     CANCELED: US Testicular and Scrotum   3. Dysuria R30.0 Chlamydia trachomatis PCR     Neisseria gonorrhoeae PCR     UROLOGY ADULT REFERRAL     CANCELED: US Testicular and Scrotum   4. Need for prophylactic vaccination and inoculation against influenza Z23 INFLUENZA QUAD, RECOMBINANT, P-FREE (RIV4) (FLUBLOCK) [65808]     Vaccine Administration, Initial [56322]       Patient Instructions   Urine test for sexually transmitted infections  Ultrasound for today.  Will go from there       Return if symptoms worsen or fail to improve.    Celia Lu PA-C  [facility]

## 2019-10-15 LAB
C TRACH DNA SPEC QL NAA+PROBE: NEGATIVE
N GONORRHOEA DNA SPEC QL NAA+PROBE: NEGATIVE
SPECIMEN SOURCE: NORMAL
SPECIMEN SOURCE: NORMAL

## 2019-10-16 ENCOUNTER — HOSPITAL ENCOUNTER (OUTPATIENT)
Dept: CARDIAC REHAB | Facility: CLINIC | Age: 64
End: 2019-10-16
Attending: PHYSICIAN ASSISTANT
Payer: COMMERCIAL

## 2019-10-16 PROCEDURE — 93798 PHYS/QHP OP CAR RHAB W/ECG: CPT | Performed by: REHABILITATION PRACTITIONER

## 2019-10-16 PROCEDURE — 40000575 ZZH STATISTIC OP CARDIAC VISIT #2: Performed by: REHABILITATION PRACTITIONER

## 2019-10-16 PROCEDURE — 93797 PHYS/QHP OP CAR RHAB WO ECG: CPT | Performed by: REHABILITATION PRACTITIONER

## 2019-10-16 PROCEDURE — 40000116 ZZH STATISTIC OP CR VISIT: Performed by: REHABILITATION PRACTITIONER

## 2019-10-18 ENCOUNTER — HOSPITAL ENCOUNTER (OUTPATIENT)
Dept: CARDIAC REHAB | Facility: CLINIC | Age: 64
End: 2019-10-18
Attending: PHYSICIAN ASSISTANT
Payer: COMMERCIAL

## 2019-10-18 PROCEDURE — 93798 PHYS/QHP OP CAR RHAB W/ECG: CPT

## 2019-10-18 PROCEDURE — 40000116 ZZH STATISTIC OP CR VISIT

## 2019-10-19 DIAGNOSIS — I47.10 PAROXYSMAL SUPRAVENTRICULAR TACHYCARDIA (H): Primary | ICD-10-CM

## 2019-10-19 NOTE — TELEPHONE ENCOUNTER
"Requested Prescriptions   Pending Prescriptions Disp Refills     clopidogrel (PLAVIX) 75 MG tablet       Sig: Take 1 tablet (75 mg) by mouth daily       Plavix Passed - 10/19/2019  1:41 PM        Passed - No active PPI on record unless is Protonix        Passed - Normal HGB on file in past 12 months     Recent Labs   Lab Test 09/18/19  0655   HGB 13.6               Passed - Normal Platelets on file in past 12 months     Recent Labs   Lab Test 09/18/19  0655                  Passed - Recent (12 mo) or future (30 days) visit within the authorizing provider's specialty     Patient has had an office visit with the authorizing provider or a provider within the authorizing providers department within the previous 12 mos or has a future within next 30 days. See \"Patient Info\" tab in inbasket, or \"Choose Columns\" in Meds & Orders section of the refill encounter.              Passed - Medication is active on med list        Passed - Patient is age 18 or older        Medication list as reported by patient now would like new prescription  Last office visit 10/14/19    "

## 2019-10-21 ENCOUNTER — HOSPITAL ENCOUNTER (OUTPATIENT)
Dept: CARDIAC REHAB | Facility: CLINIC | Age: 64
End: 2019-10-21
Attending: PHYSICIAN ASSISTANT
Payer: COMMERCIAL

## 2019-10-21 PROCEDURE — 40000116 ZZH STATISTIC OP CR VISIT: Performed by: REHABILITATION PRACTITIONER

## 2019-10-21 PROCEDURE — 40000575 ZZH STATISTIC OP CARDIAC VISIT #2: Performed by: REHABILITATION PRACTITIONER

## 2019-10-21 PROCEDURE — 93798 PHYS/QHP OP CAR RHAB W/ECG: CPT | Performed by: REHABILITATION PRACTITIONER

## 2019-10-21 PROCEDURE — 93797 PHYS/QHP OP CAR RHAB WO ECG: CPT | Performed by: REHABILITATION PRACTITIONER

## 2019-10-21 RX ORDER — CLOPIDOGREL BISULFATE 75 MG/1
75 TABLET ORAL DAILY
Qty: 90 TABLET | Refills: 1 | Status: SHIPPED | OUTPATIENT
Start: 2019-10-21 | End: 2020-05-07

## 2019-10-23 ENCOUNTER — HOSPITAL ENCOUNTER (OUTPATIENT)
Dept: CARDIAC REHAB | Facility: CLINIC | Age: 64
End: 2019-10-23
Attending: PHYSICIAN ASSISTANT
Payer: COMMERCIAL

## 2019-10-23 PROCEDURE — 93798 PHYS/QHP OP CAR RHAB W/ECG: CPT

## 2019-10-23 PROCEDURE — 40000116 ZZH STATISTIC OP CR VISIT

## 2019-10-28 ENCOUNTER — HOSPITAL ENCOUNTER (OUTPATIENT)
Dept: CARDIAC REHAB | Facility: CLINIC | Age: 64
End: 2019-10-28
Attending: PHYSICIAN ASSISTANT
Payer: COMMERCIAL

## 2019-10-28 PROCEDURE — 93797 PHYS/QHP OP CAR RHAB WO ECG: CPT

## 2019-10-28 PROCEDURE — 93798 PHYS/QHP OP CAR RHAB W/ECG: CPT

## 2019-10-28 PROCEDURE — 40000116 ZZH STATISTIC OP CR VISIT

## 2019-10-28 PROCEDURE — 40000575 ZZH STATISTIC OP CARDIAC VISIT #2

## 2019-10-30 ENCOUNTER — HOSPITAL ENCOUNTER (OUTPATIENT)
Dept: CARDIAC REHAB | Facility: CLINIC | Age: 64
End: 2019-10-30
Attending: PHYSICIAN ASSISTANT
Payer: COMMERCIAL

## 2019-10-30 PROCEDURE — 93798 PHYS/QHP OP CAR RHAB W/ECG: CPT

## 2019-10-30 PROCEDURE — 40000116 ZZH STATISTIC OP CR VISIT

## 2019-11-04 ENCOUNTER — HOSPITAL ENCOUNTER (OUTPATIENT)
Dept: CARDIAC REHAB | Facility: CLINIC | Age: 64
End: 2019-11-04
Attending: PHYSICIAN ASSISTANT
Payer: COMMERCIAL

## 2019-11-04 PROCEDURE — 40000116 ZZH STATISTIC OP CR VISIT

## 2019-11-04 PROCEDURE — 93798 PHYS/QHP OP CAR RHAB W/ECG: CPT

## 2019-11-06 ENCOUNTER — HOSPITAL ENCOUNTER (OUTPATIENT)
Dept: CARDIAC REHAB | Facility: CLINIC | Age: 64
End: 2019-11-06
Attending: PHYSICIAN ASSISTANT
Payer: COMMERCIAL

## 2019-11-06 PROCEDURE — 93798 PHYS/QHP OP CAR RHAB W/ECG: CPT | Performed by: REHABILITATION PRACTITIONER

## 2019-11-06 PROCEDURE — 40000116 ZZH STATISTIC OP CR VISIT: Performed by: REHABILITATION PRACTITIONER

## 2019-11-07 DIAGNOSIS — N45.1 ACUTE EPIDIDYMITIS: ICD-10-CM

## 2019-11-07 RX ORDER — DOXYCYCLINE 100 MG/1
TABLET ORAL
Qty: 28 TABLET | Refills: 0 | OUTPATIENT
Start: 2019-11-07

## 2019-11-07 RX ORDER — DOXYCYCLINE 100 MG/1
TABLET ORAL
Qty: 0.0001 TABLET | Refills: 0 | Status: SHIPPED | OUTPATIENT
Start: 2019-11-07 | End: 2020-03-09

## 2019-11-07 NOTE — TELEPHONE ENCOUNTER
Requested Prescriptions   Pending Prescriptions Disp Refills     doxycycline monohydrate (ADOXA) 100 MG tablet [Pharmacy Med Name: DOXYCYCLINE MONOHYDRATE 100MG TABS] 28 tablet 0     Sig: TAKE ONE TABLET BY MOUTH TWICE A DAY       There is no refill protocol information for this order        Last Written Prescription Date:  10/14/19  Last Fill Quantity: 28,  # refills: 0   Last office visit: 10/14/2019 with prescribing provider:     Future Office Visit:   Next 5 appointments (look out 90 days)    Jan 09, 2020  8:30 AM CST  Return Visit with Alvaro Caballero MD  [facility] ([facility]) [address] [place] [id number]  [phone]         Routing refill request to provider for review/approval because:  Drug not on the [facility], [facility] or [facility] refill protocol or controlled substance

## 2019-11-13 ENCOUNTER — HOSPITAL ENCOUNTER (OUTPATIENT)
Dept: CARDIAC REHAB | Facility: CLINIC | Age: 64
End: 2019-11-13
Attending: PHYSICIAN ASSISTANT
Payer: COMMERCIAL

## 2019-11-13 PROCEDURE — 40000116 ZZH STATISTIC OP CR VISIT

## 2019-11-13 PROCEDURE — 93798 PHYS/QHP OP CAR RHAB W/ECG: CPT

## 2019-11-18 ENCOUNTER — HOSPITAL ENCOUNTER (OUTPATIENT)
Dept: CARDIAC REHAB | Facility: CLINIC | Age: 64
End: 2019-11-18
Attending: PHYSICIAN ASSISTANT
Payer: COMMERCIAL

## 2019-11-18 PROCEDURE — 40000116 ZZH STATISTIC OP CR VISIT

## 2019-11-18 PROCEDURE — 93798 PHYS/QHP OP CAR RHAB W/ECG: CPT

## 2019-11-20 ENCOUNTER — OFFICE VISIT (OUTPATIENT)
Dept: UROLOGY | Facility: CLINIC | Age: 64
End: 2019-11-20
Payer: COMMERCIAL

## 2019-11-20 ENCOUNTER — HOSPITAL ENCOUNTER (OUTPATIENT)
Dept: CARDIAC REHAB | Facility: CLINIC | Age: 64
End: 2019-11-20
Attending: PHYSICIAN ASSISTANT
Payer: COMMERCIAL

## 2019-11-20 VITALS
TEMPERATURE: 98.3 F | BODY MASS INDEX: 27.46 KG/M2 | RESPIRATION RATE: 12 BRPM | WEIGHT: 160 LBS | DIASTOLIC BLOOD PRESSURE: 60 MMHG | SYSTOLIC BLOOD PRESSURE: 101 MMHG | HEART RATE: 64 BPM

## 2019-11-20 DIAGNOSIS — Z12.5 SCREENING FOR PROSTATE CANCER: Primary | ICD-10-CM

## 2019-11-20 DIAGNOSIS — E78.5 HYPERLIPIDEMIA LDL GOAL <70: ICD-10-CM

## 2019-11-20 LAB — PSA SERPL-ACNC: 2.79 UG/L (ref 0–4)

## 2019-11-20 PROCEDURE — 93798 PHYS/QHP OP CAR RHAB W/ECG: CPT

## 2019-11-20 PROCEDURE — 36415 COLL VENOUS BLD VENIPUNCTURE: CPT | Performed by: UROLOGY

## 2019-11-20 PROCEDURE — 99243 OFF/OP CNSLTJ NEW/EST LOW 30: CPT | Performed by: UROLOGY

## 2019-11-20 PROCEDURE — 40000116 ZZH STATISTIC OP CR VISIT

## 2019-11-20 PROCEDURE — G0103 PSA SCREENING: HCPCS | Performed by: UROLOGY

## 2019-11-20 RX ORDER — ATORVASTATIN CALCIUM 80 MG/1
80 TABLET, FILM COATED ORAL DAILY
Qty: 90 TABLET | Refills: 3 | Status: SHIPPED | OUTPATIENT
Start: 2019-11-20 | End: 2020-11-09

## 2019-11-20 NOTE — PROGRESS NOTES
Appointment source: New Patient  Patient name: Jacques Sharp  Urology Staff: Kyle Rogers MD    Seen at the request of SHAE Lu    Subjective: This is a 64 year old year old male complaining of right recurrent epididymitis.    Most recently treated with doxycycline and rocephin.    Had previous episode several years ago.    Review of systems: a comprehensive 10 point ROS was obtained and was otherwise negative except for that outlined above.    Problem list and histories reviewed & adjusted, as indicated.  Additional history: as documented    No family history of prostate cancer    Objective:  Examination:    Healthy male  HEENT: anicteric sclera, normal extraocular movements  Respiratory: normal, non labored breathing  Musculoskeletal:Normal muscular movements  Skin normal temperature, no rash  Psychiatric: appropriate affect    Abdomen benign  No evidence of inguinal hernia  No evidence of inguinal adenopathy  Phallus without lesion. No evidence of peyronie's plaque  Scrotal contents normal  Rectal examination normal  Prostate benign to palpation    Scrotal ultrasound:    FINDINGS: The testicles are normal. There is some asymmetric  prominence of the right epididymis with increased blood flow on color  Doppler. There also is a 0.4 cm cyst in the right epididymal head.  Dilated tubular structures lateral in the right hemiscrotum are seen.  At least some of them were present before. They are consistent with  dilated varicosities. Its difficult to demonstrate blood flow within  these on color Doppler. This could in part relate to very slow flow.  Component of thrombosis of these varicosities is also possible. Again,  much of this is chronic as it was at least partially imaged before.    IMPRESSION:   1. Right-sided epididymitis is present.  2.  Other more chronic findings are seen, as described above    PSA 2.79 ng/mL    Post void residual 22 mL    Assessment:  Recurrent epididymitis managed successfully with  antibiotics. No findings prompt additional evaluation.    No evidence of voiding dysfunction.    Plan:  No additional intervention indicated at this time.    Return PRN.

## 2019-11-20 NOTE — NURSING NOTE
"Initial /60 (BP Location: Right arm, Patient Position: Sitting, Cuff Size: Adult Regular)   Pulse 64   Temp 98.3  F (36.8  C) (Tympanic)   Resp 12   Wt 72.6 kg (160 lb)   BMI 27.46 kg/m   Estimated body mass index is 27.46 kg/m  as calculated from the following:    Height as of 10/14/19: 1.626 m (5' 4\").    Weight as of this encounter: 72.6 kg (160 lb). .    After lying down for awhile, he has trouble getting urine to start.  Problem with ejaculation, sensation felt like he released (ejaculated) and felt it was backing up, thinks this may have caused the epididymitis.     Siena Gil  [facility] RN    "

## 2019-11-25 ENCOUNTER — HOSPITAL ENCOUNTER (OUTPATIENT)
Dept: CARDIAC REHAB | Facility: CLINIC | Age: 64
End: 2019-11-25
Attending: PHYSICIAN ASSISTANT
Payer: COMMERCIAL

## 2019-11-25 PROCEDURE — 40000116 ZZH STATISTIC OP CR VISIT

## 2019-11-25 PROCEDURE — 93798 PHYS/QHP OP CAR RHAB W/ECG: CPT

## 2019-11-27 ENCOUNTER — HOSPITAL ENCOUNTER (OUTPATIENT)
Dept: CARDIAC REHAB | Facility: CLINIC | Age: 64
End: 2019-11-27
Attending: PHYSICIAN ASSISTANT
Payer: COMMERCIAL

## 2019-11-27 PROCEDURE — 93798 PHYS/QHP OP CAR RHAB W/ECG: CPT

## 2019-11-27 PROCEDURE — 40000116 ZZH STATISTIC OP CR VISIT

## 2019-12-02 ENCOUNTER — HOSPITAL ENCOUNTER (OUTPATIENT)
Dept: CARDIAC REHAB | Facility: CLINIC | Age: 64
End: 2019-12-02
Attending: PHYSICIAN ASSISTANT
Payer: COMMERCIAL

## 2019-12-02 DIAGNOSIS — I21.3 ST ELEVATION MYOCARDIAL INFARCTION (STEMI), UNSPECIFIED ARTERY (H): ICD-10-CM

## 2019-12-02 PROCEDURE — 40000116 ZZH STATISTIC OP CR VISIT

## 2019-12-02 PROCEDURE — 93798 PHYS/QHP OP CAR RHAB W/ECG: CPT

## 2019-12-02 RX ORDER — LISINOPRIL 5 MG/1
TABLET ORAL
Qty: 30 TABLET | Refills: 0 | Status: SHIPPED | OUTPATIENT
Start: 2019-12-02 | End: 2020-01-15

## 2019-12-02 RX ORDER — METOPROLOL TARTRATE 25 MG/1
TABLET, FILM COATED ORAL
Qty: 60 TABLET | Refills: 0 | Status: SHIPPED | OUTPATIENT
Start: 2019-12-02 | End: 2020-01-15

## 2019-12-04 ENCOUNTER — HOSPITAL ENCOUNTER (OUTPATIENT)
Dept: CARDIAC REHAB | Facility: CLINIC | Age: 64
End: 2019-12-04
Attending: PHYSICIAN ASSISTANT
Payer: COMMERCIAL

## 2019-12-04 PROCEDURE — 93798 PHYS/QHP OP CAR RHAB W/ECG: CPT

## 2019-12-04 PROCEDURE — 40000116 ZZH STATISTIC OP CR VISIT

## 2019-12-04 PROCEDURE — 93797 PHYS/QHP OP CAR RHAB WO ECG: CPT

## 2019-12-04 PROCEDURE — 40000575 ZZH STATISTIC OP CARDIAC VISIT #2

## 2019-12-09 ENCOUNTER — OFFICE VISIT (OUTPATIENT)
Dept: SPIRITUAL SERVICES | Facility: CLINIC | Age: 64
End: 2019-12-09

## 2019-12-09 ENCOUNTER — HOSPITAL ENCOUNTER (OUTPATIENT)
Dept: CARDIAC REHAB | Facility: CLINIC | Age: 64
End: 2019-12-09
Attending: PHYSICIAN ASSISTANT
Payer: COMMERCIAL

## 2019-12-09 PROCEDURE — 40000575 ZZH STATISTIC OP CARDIAC VISIT #2

## 2019-12-09 PROCEDURE — 93797 PHYS/QHP OP CAR RHAB WO ECG: CPT

## 2019-12-09 PROCEDURE — 93798 PHYS/QHP OP CAR RHAB W/ECG: CPT

## 2019-12-09 PROCEDURE — 40000116 ZZH STATISTIC OP CR VISIT

## 2019-12-09 NOTE — PROGRESS NOTES
Spirituality Group Note    [facility]    Name:    Jacques Sharp                                                                     YOB: 1955    MRN:     [id number]                                                                       Age: 64 year old      Patient attended -led group, which included discussion of Coping with Depression and Anxiety during Serious Illness, Emotional Responses to Cardiac Illness, and The Healing Process as it relates to coping with stress.    Patient attended group for 1.0 hrs.    The patient actively participated in group discussion and shared some of the unique and similar aspects of his cardiac story.      Farooq Morel M.A., [facility]  Staff    Spiritual Health Services  [facility]  [phone] (Office)  [phone] (Pager)  [email or web address]

## 2019-12-10 DIAGNOSIS — I47.10 PAROXYSMAL SUPRAVENTRICULAR TACHYCARDIA (H): ICD-10-CM

## 2019-12-10 NOTE — TELEPHONE ENCOUNTER
"Spoke with [facility] this script ASA Script was sent 7/3/19 Qty 90 with 3 refills.  They said in their system the [facility] ASA Script  it is inactive. They would need another script sent to them.  Paige [place] Sec    Requested Prescriptions   Pending Prescriptions Disp Refills     aspirin (ASA) 81 MG chewable tablet 90 tablet      Sig: Take 1 tablet (81 mg) by mouth daily       Analgesics (Non-Narcotic Tylenol and ASA Only) Passed - 12/10/2019  3:48 PM        Passed - Recent (12 mo) or future (30 days) visit within the authorizing provider's specialty     Patient has had an office visit with the authorizing provider or a provider within the authorizing providers department within the previous 12 mos or has a future within next 30 days. See \"Patient Info\" tab in inbasket, or \"Choose Columns\" in Meds & Orders section of the refill encounter.              Passed - Patient is age 20 years or older     If ASA is flagged for ages under 20 years old. Forward to provider for confirmation Ryes Syndrome is not a concern.              Passed - Medication is active on med list        Last Written Prescription Date:  7/3/19  Last Fill Quantity: 90,  # refills: 3   Last office visit: 10/14/2019 with prescribing provider:  Celia Lu PA-C  Future Office Visit:   Next 5 appointments (look out 90 days)    Jan 09, 2020  8:30 AM CST  Return Visit with Alvaro Caballero MD  [facility] ([facility]) [address] [place] [id number] [phone]               "

## 2019-12-11 ENCOUNTER — HOSPITAL ENCOUNTER (OUTPATIENT)
Dept: CARDIAC REHAB | Facility: CLINIC | Age: 64
End: 2019-12-11
Attending: PHYSICIAN ASSISTANT
Payer: COMMERCIAL

## 2019-12-11 PROCEDURE — 93798 PHYS/QHP OP CAR RHAB W/ECG: CPT

## 2019-12-11 PROCEDURE — 40000116 ZZH STATISTIC OP CR VISIT

## 2019-12-11 RX ORDER — ASPIRIN 81 MG/1
81 TABLET, CHEWABLE ORAL DAILY
Qty: 90 TABLET | Refills: 0 | Status: SHIPPED | OUTPATIENT
Start: 2019-12-11 | End: 2020-03-11

## 2019-12-13 ENCOUNTER — HOSPITAL ENCOUNTER (OUTPATIENT)
Dept: CARDIAC REHAB | Facility: CLINIC | Age: 64
End: 2019-12-13
Attending: PHYSICIAN ASSISTANT
Payer: COMMERCIAL

## 2019-12-13 PROCEDURE — 93798 PHYS/QHP OP CAR RHAB W/ECG: CPT

## 2019-12-13 PROCEDURE — 40000116 ZZH STATISTIC OP CR VISIT

## 2020-01-15 DIAGNOSIS — I21.3 ST ELEVATION MYOCARDIAL INFARCTION (STEMI), UNSPECIFIED ARTERY (H): ICD-10-CM

## 2020-01-15 RX ORDER — LISINOPRIL 5 MG/1
TABLET ORAL
Qty: 30 TABLET | Refills: 0 | Status: SHIPPED | OUTPATIENT
Start: 2020-01-15 | End: 2020-02-07

## 2020-01-15 RX ORDER — METOPROLOL TARTRATE 25 MG/1
TABLET, FILM COATED ORAL
Qty: 60 TABLET | Refills: 0 | Status: SHIPPED | OUTPATIENT
Start: 2020-01-15 | End: 2020-02-07

## 2020-01-15 NOTE — TELEPHONE ENCOUNTER
"Requested Prescriptions   Pending Prescriptions Disp Refills     metoprolol tartrate (LOPRESSOR) 25 MG tablet [Pharmacy Med Name: METOPROLOL TARTRATE 25MG TABS] 60 tablet 0     Sig: TAKE ONE TABLET BY MOUTH TWICE A DAY       Beta-Blockers Protocol Passed - 1/15/2020  9:10 AM        Passed - Blood pressure under 140/90 in past 12 months     BP Readings from Last 3 Encounters:   11/20/19 101/60   10/14/19 137/79   09/30/19 116/62                 Passed - Patient is age 6 or older        Passed - Recent (12 mo) or future (30 days) visit within the authorizing provider's specialty     Patient has had an office visit with the authorizing provider or a provider within the authorizing providers department within the previous 12 mos or has a future within next 30 days. See \"Patient Info\" tab in inbasket, or \"Choose Columns\" in Meds & Orders section of the refill encounter.              Passed - Medication is active on med list        lisinopril (PRINIVIL/ZESTRIL) 5 MG tablet [Pharmacy Med Name: LISINOPRIL 5MG TABS] 30 tablet 0     Sig: TAKE ONE TABLET BY MOUTH ONCE DAILY       ACE Inhibitors (Including Combos) Protocol Passed - 1/15/2020  9:10 AM        Passed - Blood pressure under 140/90 in past 12 months     BP Readings from Last 3 Encounters:   11/20/19 101/60   10/14/19 137/79   09/30/19 116/62                 Passed - Recent (12 mo) or future (30 days) visit within the authorizing provider's specialty     Patient has had an office visit with the authorizing provider or a provider within the authorizing providers department within the previous 12 mos or has a future within next 30 days. See \"Patient Info\" tab in inbasket, or \"Choose Columns\" in Meds & Orders section of the refill encounter.              Passed - Medication is active on med list        Passed - Patient is age 18 or older        Passed - Normal serum creatinine on file in past 12 months     Recent Labs   Lab Test 09/19/19  0452   CR 0.80             " Passed - Normal serum potassium on file in past 12 months     Recent Labs   Lab Test 09/19/19  0452   POTASSIUM 4.4             Last Written Prescription Date:  12/22/19  Last Fill Quantity: 60,  # refills: 0   Last office visit: 10/14/2019 with prescribing provider:     Future Office Visit:   Next 5 appointments (look out 90 days)    Mar 09, 2020  8:30 AM CDT  Return Visit with Alvaro Caballero MD  [facility] ([facility]) [address] [place] [id number] [phone]

## 2020-02-25 ENCOUNTER — ALLIED HEALTH/NURSE VISIT (OUTPATIENT)
Dept: FAMILY MEDICINE | Facility: CLINIC | Age: 65
End: 2020-02-25
Payer: COMMERCIAL

## 2020-02-25 VITALS — SYSTOLIC BLOOD PRESSURE: 136 MMHG | HEART RATE: 58 BPM | DIASTOLIC BLOOD PRESSURE: 64 MMHG | RESPIRATION RATE: 16 BRPM

## 2020-02-25 DIAGNOSIS — Z01.30 BP CHECK: Primary | ICD-10-CM

## 2020-02-25 PROCEDURE — 99207 ZZC NO CHARGE NURSE ONLY: CPT

## 2020-02-25 NOTE — PROGRESS NOTES
Jacques Sharp is a 64 year old year old patient who comes in today for a Blood Pressure check because of ongoing blood pressure monitoring.  Vital Signs as repeated by /64  Patient is taking medication as prescribed  Patient is tolerating medications well.  Patient is monitoring Blood Pressure at home.  Average readings if yes are 144/73 and after exercise and decaf coffee 160/81.  Pulse 48-60  Current complaints: he says at times and at late afternoon can feel palpations.  After taking second dose of metoprolol he feels better  Disposition:  Keep cardiology appointment.  Advised to check BP and pulse, keep record and bring to appointment  Ban Calle RN

## 2020-03-05 NOTE — PROGRESS NOTES
CARDIOLOGY VISIT    REASON FOR VISIT: CAD    SUBJECTIVE:  64-year-old male seen for follow-up of SVT and coronary artery disease.  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  He had a postop inferior STEMI with subsequent drug-eluting stent to the RCA and then staged intervention of the OM 2.  He was also noted to have atrial tachycardia originating near the his node and no ablation was pursued.  He has been on beta blocker therapy since then.       Echocardiogram April 2014 showed ejection fraction 50-55% with mild inferior and inferolateral hypokinesis, normal right ventricle, no significant valve disease. Exercise nuclear stress test November 2015 showed 12 minutes 30 seconds on the Roderick protocol, 13 mets, 99% maximum heart rate achieved, no chest pain, small fixed inferior wall defect consistent with infarct, no ischemia, ejection fraction 51% with mild inferior hypokinesis.     September 2019 he presented to [facility] with inferior STEMI, troponin 47.  Angiogram showed 100% mid RCA lesion, 80% RPDA, 60% third RPL, 45% proximal to mid LAD, 25% proximal circumflex.  This is a late stent thrombosis of the distal RCA, repeat drug-eluting stents placed.  Echo showed EF 55%, inferior and inferolateral akinesis, normal RV, no valve disease.     He has been doing well recently.  He does some regular exercise with no chest pain or shortness of breath.  Blood pressure tends to run 110-120s.  Heart rate is 58-70.  He is hoping to have surgery on both feet, he has arthritic pain that limits his activity somewhat.  He also has never had a colonoscopy.  He has questions about clopidogrel.    MEDICATIONS:  Current Outpatient Medications   Medication     acetaminophen (TYLENOL) 325 MG tablet     aspirin (ASA) 81 MG chewable tablet     atorvastatin (LIPITOR) 80 MG tablet     clopidogrel (PLAVIX) 75 MG tablet     doxycycline monohydrate (ADOXA) 100 MG tablet     lisinopril (PRINIVIL/ZESTRIL) 5 MG tablet      metoprolol tartrate (LOPRESSOR) 25 MG tablet     nitroGLYcerin (NITROSTAT) 0.4 MG sublingual tablet     sildenafil (REVATIO) 20 MG tablet     No current facility-administered medications for this visit.        ALLERGIES:  Allergies   Allergen Reactions     Sulfa Drugs        REVIEW OF SYSTEMS:  Constitutional:  No weight loss, fever, chills, weakness or fatigue.  HEENT:  Eyes:  No visual loss, blurred vision, double vision or yellow sclerae. No hearing loss, sneezing, congestion, runny nose or sore throat.  Skin:  No rash or itching.  Cardiovascular: per HPI  Respiratory: per HPI  GI:  No anorexia, nausea, vomiting or diarrhea. No abdominal pain or blood.  :  No dysurea, hematuria  Neurologic:  No headache, dizziness, syncope, paralysis, ataxia, numbness or tingling in the extremities. No change in bowel or bladder control.  Musculoskeletal:  No muscle, back pain, joint pain or stiffness.  Hematologic:  No anemia, bleeding or bruising.  Lymphatics:  No enlarged nodes. No history of splenectomy.  Psychiatric:  No history of depression or anxiety.  Endocrine:  No reports of sweating, cold or heat intolerance. No polyuria or polydipsia.  Allergies:  No history of asthma, hives, eczema or rhinitis.    PHYSICAL EXAM:  /66   Pulse 56   Wt 75.3 kg (166 lb)   SpO2 99%   BMI 28.49 kg/m      Constitutional: awake, alert, no distress  Eyes: PERRL, sclera nonicteric  ENT: trachea midline  Respiratory: Lungs clear  Cardiovascular: Regular rate and rhythm, no murmurs  GI: nondistended, nontender, bowel sounds present  Lymph/Hematologic: no lymphadenopathy  Skin: dry, no rash  Musculoskeletal: good muscle tone, strength 5/5 in upper and lower extremities  Neurologic: no focal deficits  Neuropsychiatric: appropriate affact    DATA:  Lab: September 2019: Creatinine 0.8  Recent Labs   Lab Test 09/17/19  2315 05/06/19  0853  12/01/14  1228 01/30/14  0725   CHOL 134 150   < > 173 118   HDL 36* 47   < > 42 25*   LDL 64  84   < > 100 73   TRIG 170* 94   < > 155* 100   CHOLHDLRATIO  --   --   --  4.1 4.7    < > = values in this interval not displayed.     ASSESSMENT:  64-year-old male seen for follow-up of coronary artery disease.  He is doing well with no concerning cardiac symptoms.  Metoprolol will be decreased, he feels a little sluggish and heart rate is a little slow at times.  We talked about clopidogrel, he is asking when he can hold it for foot surgery and a colonoscopy.  Preferably he would not do this until late summer or fall when it was closer to 12 months from his stents.  Especially with the history of possible lead stent thrombosis in the RCA stent.    RECOMMENDATIONS:  1.  Coronary artery disease  -Continue dual antiplatelet therapy through September 2020, probably best to continue indefinitely thereafter due to possible late stent thrombosis  -Okay to hold clopidogrel August or September 2020 for colonoscopy and foot surgery, recommend holding the shortest amount of time possible such as 4 to 5 days before procedures  - Continue other cardiac medications  -Lipids at goal, continue high-dose atorvastatin    2.  History of SVT, status post ablation  -No recurrence, continue beta-blocker    Follow-up in 5 months with FLORENCIA, recheck lipids.    Alvaro Caballero MD  Cardiology - [facility]  Pager:  [phone]  Text Page  March 9, 2020

## 2020-03-09 ENCOUNTER — OFFICE VISIT (OUTPATIENT)
Dept: CARDIOLOGY | Facility: CLINIC | Age: 65
End: 2020-03-09
Attending: NURSE PRACTITIONER
Payer: COMMERCIAL

## 2020-03-09 VITALS
DIASTOLIC BLOOD PRESSURE: 66 MMHG | OXYGEN SATURATION: 99 % | SYSTOLIC BLOOD PRESSURE: 138 MMHG | WEIGHT: 166 LBS | BODY MASS INDEX: 28.49 KG/M2 | HEART RATE: 56 BPM

## 2020-03-09 DIAGNOSIS — I21.3 ST ELEVATION MYOCARDIAL INFARCTION (STEMI), UNSPECIFIED ARTERY (H): ICD-10-CM

## 2020-03-09 DIAGNOSIS — I25.10 CORONARY ARTERY DISEASE INVOLVING NATIVE CORONARY ARTERY OF NATIVE HEART WITHOUT ANGINA PECTORIS: Primary | ICD-10-CM

## 2020-03-09 DIAGNOSIS — I25.2 HISTORY OF ACUTE INFERIOR WALL MI: ICD-10-CM

## 2020-03-09 PROCEDURE — 99214 OFFICE O/P EST MOD 30 MIN: CPT | Performed by: INTERNAL MEDICINE

## 2020-03-09 RX ORDER — METOPROLOL TARTRATE 25 MG/1
12.5 TABLET, FILM COATED ORAL 2 TIMES DAILY
Qty: 90 TABLET | Refills: 2 | Status: SHIPPED | OUTPATIENT
Start: 2020-03-09 | End: 2020-09-24

## 2020-03-09 NOTE — PATIENT INSTRUCTIONS
"[facility]~[address]~[place], [place]~[address]  Thank you for your  Heart Care visit today. If you have questions regarding your visit, please contact your cardiology RN, Coral Ratliff, at [phone].    Please arrive 15 minutes early to all cardiology appointments.    To schedule a future appointment, we kindly ask that you call cardiology scheduling at [phone] three months prior to requested revisit date.  [facility] cardiology clinic is staffed with \"Advance Practice Providers\". These are our cardiology Physician Assistants and Nurse Practitioners.   Please call cardiology scheduling if you feel you need clinical evaluation with them at any time for any cardiac reason.     Reminder:  For your safety, we ask that you bring in your current medication(s) or an updated list of your medications with you to EACH office visit. Include the medication name, dose of pill on bottle and how you are taking it. Include over-the-counter medications or supplements. Your provider will review this at each visit and plan your care based on your current information.   ~~~~~~~~~~~~~~~~~~~~~~~~~~~~~~~~~~~~~~~  \"[facility] [place] telephone numbers for reference:  Cardiology Scheduling~[phone]  Diagnostic Imaging Scheduling~[phone]  Lab Scheduling~[phone]  Anticoagulation Clinic~[phone]  Cardiac Rehabilitation~[phone]  CORE Clinic RN's~[phone] (at [facility])  [facility] Team [phone] (at [facility])  Cardiology Clinic RN's~[phone] (Coral Ratliff, RN)  ~~~~~~~~~~~~~~~~~~~~~~~~~~~~~~~~~~~~~~~~      1. Decrease metoprolol to 12.5mg twice daily.  "

## 2020-03-09 NOTE — LETTER
3/9/2020    Luis Alberto Mcgowan MD  [address] [facility] [address]    RE: Jacques ARNOLD Sharp       Dear Colleague,    I had the pleasure of seeing Jacques ARNOLD Aron in the [facility].    CARDIOLOGY VISIT    REASON FOR VISIT: CAD    SUBJECTIVE:  64-year-old male seen for follow-up of SVT and coronary artery disease.  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  He had a postop inferior STEMI with subsequent drug-eluting stent to the RCA and then staged intervention of the OM 2.  He was also noted to have atrial tachycardia originating near the his node and no ablation was pursued.  He has been on beta blocker therapy since then.       Echocardiogram April 2014 showed ejection fraction 50-55% with mild inferior and inferolateral hypokinesis, normal right ventricle, no significant valve disease. Exercise nuclear stress test November 2015 showed 12 minutes 30 seconds on the Rodeirck protocol, 13 mets, 99% maximum heart rate achieved, no chest pain, small fixed inferior wall defect consistent with infarct, no ischemia, ejection fraction 51% with mild inferior hypokinesis.     September 2019 he presented to [facility] with inferior STEMI, troponin 47.  Angiogram showed 100% mid RCA lesion, 80% RPDA, 60% third RPL, 45% proximal to mid LAD, 25% proximal circumflex.  This is a late stent thrombosis of the distal RCA, repeat drug-eluting stents placed.  Echo showed EF 55%, inferior and inferolateral akinesis, normal RV, no valve disease.     He has been doing well recently.  He does some regular exercise with no chest pain or shortness of breath.  Blood pressure tends to run 110-120s.  Heart rate is 58-70.  He is hoping to have surgery on both feet, he has arthritic pain that limits his activity somewhat.  He also has never had a colonoscopy.  He has questions about clopidogrel.    MEDICATIONS:  Current Outpatient Medications   Medication     acetaminophen (TYLENOL) 325 MG tablet      aspirin (ASA) 81 MG chewable tablet     atorvastatin (LIPITOR) 80 MG tablet     clopidogrel (PLAVIX) 75 MG tablet     doxycycline monohydrate (ADOXA) 100 MG tablet     lisinopril (PRINIVIL/ZESTRIL) 5 MG tablet     metoprolol tartrate (LOPRESSOR) 25 MG tablet     nitroGLYcerin (NITROSTAT) 0.4 MG sublingual tablet     sildenafil (REVATIO) 20 MG tablet     No current facility-administered medications for this visit.        ALLERGIES:  Allergies   Allergen Reactions     Sulfa Drugs        REVIEW OF SYSTEMS:  Constitutional:  No weight loss, fever, chills, weakness or fatigue.  HEENT:  Eyes:  No visual loss, blurred vision, double vision or yellow sclerae. No hearing loss, sneezing, congestion, runny nose or sore throat.  Skin:  No rash or itching.  Cardiovascular: per HPI  Respiratory: per HPI  GI:  No anorexia, nausea, vomiting or diarrhea. No abdominal pain or blood.  :  No dysurea, hematuria  Neurologic:  No headache, dizziness, syncope, paralysis, ataxia, numbness or tingling in the extremities. No change in bowel or bladder control.  Musculoskeletal:  No muscle, back pain, joint pain or stiffness.  Hematologic:  No anemia, bleeding or bruising.  Lymphatics:  No enlarged nodes. No history of splenectomy.  Psychiatric:  No history of depression or anxiety.  Endocrine:  No reports of sweating, cold or heat intolerance. No polyuria or polydipsia.  Allergies:  No history of asthma, hives, eczema or rhinitis.    PHYSICAL EXAM:  /66   Pulse 56   Wt 75.3 kg (166 lb)   SpO2 99%   BMI 28.49 kg/m      Constitutional: awake, alert, no distress  Eyes: PERRL, sclera nonicteric  ENT: trachea midline  Respiratory: Lungs clear  Cardiovascular: Regular rate and rhythm, no murmurs  GI: nondistended, nontender, bowel sounds present  Lymph/Hematologic: no lymphadenopathy  Skin: dry, no rash  Musculoskeletal: good muscle tone, strength 5/5 in upper and lower extremities  Neurologic: no focal deficits  Neuropsychiatric:  appropriate affact    DATA:  Lab: September 2019: Creatinine 0.8  Recent Labs   Lab Test 09/17/19  2315 05/06/19  0853  12/01/14  1228 01/30/14  0725   CHOL 134 150   < > 173 118   HDL 36* 47   < > 42 25*   LDL 64 84   < > 100 73   TRIG 170* 94   < > 155* 100   CHOLHDLRATIO  --   --   --  4.1 4.7    < > = values in this interval not displayed.     ASSESSMENT:  64-year-old male seen for follow-up of coronary artery disease.  He is doing well with no concerning cardiac symptoms.  Metoprolol will be decreased, he feels a little sluggish and heart rate is a little slow at times.  We talked about clopidogrel, he is asking when he can hold it for foot surgery and a colonoscopy.  Preferably he would not do this until late summer or fall when it was closer to 12 months from his stents.  Especially with the history of possible lead stent thrombosis in the RCA stent.    RECOMMENDATIONS:  1.  Coronary artery disease  -Continue dual antiplatelet therapy through September 2020, probably best to continue indefinitely thereafter due to possible late stent thrombosis  -Okay to hold clopidogrel August or September 2020 for colonoscopy and foot surgery, recommend holding the shortest amount of time possible such as 4 to 5 days before procedures  - Continue other cardiac medications  -Lipids at goal, continue high-dose atorvastatin    2.  History of SVT, status post ablation  -No recurrence, continue beta-blocker    Follow-up in 5 months with FLORENCIA, recheck lipids.    Alvaro Caballero MD  Cardiology - [facility]  Pager:  [phone]  Text Page  March 9, 2020        Thank you for allowing me to participate in the care of your patient.      Sincerely,     Alvaro Caballero MD     [facility]    cc:   Carlita Mckinney, RAHUL  [address] [facility] [address]  [place] [address]

## 2020-03-10 DIAGNOSIS — I47.10 PAROXYSMAL SUPRAVENTRICULAR TACHYCARDIA (H): ICD-10-CM

## 2020-03-10 NOTE — TELEPHONE ENCOUNTER
"Requested Prescriptions   Pending Prescriptions Disp Refills     aspirin (ASA) 81 MG chewable tablet [Pharmacy Med Name: ASPIRIN 81MG CHEW] 90 tablet 0     Sig: CHEW AND SWALLOW ONE TABLET BY MOUTH ONCE DAILY       Analgesics (Non-Narcotic Tylenol and ASA Only) Passed - 3/10/2020  5:02 PM        Passed - Recent (12 mo) or future (30 days) visit within the authorizing provider's specialty     Patient has had an office visit with the authorizing provider or a provider within the authorizing providers department within the previous 12 mos or has a future within next 30 days. See \"Patient Info\" tab in inbasket, or \"Choose Columns\" in Meds & Orders section of the refill encounter.              Passed - Patient is age 20 years or older     If ASA is flagged for ages under 20 years old. Forward to provider for confirmation Ryes Syndrome is not a concern.              Passed - Medication is active on med list         Last Written Prescription Date:  12/11/19  Last Fill Quantity: 90,  # refills: 0   Last office visit: 2/25/2020 with prescribing provider:     Future Office Visit:    "

## 2020-03-11 RX ORDER — ASPIRIN 81 MG/1
TABLET, CHEWABLE ORAL
Qty: 90 TABLET | Refills: 3 | Status: SHIPPED | OUTPATIENT
Start: 2020-03-11

## 2020-06-17 ENCOUNTER — OFFICE VISIT (OUTPATIENT)
Dept: FAMILY MEDICINE | Facility: CLINIC | Age: 65
End: 2020-06-17
Payer: COMMERCIAL

## 2020-06-17 VITALS
WEIGHT: 156 LBS | DIASTOLIC BLOOD PRESSURE: 60 MMHG | SYSTOLIC BLOOD PRESSURE: 100 MMHG | RESPIRATION RATE: 16 BRPM | HEIGHT: 64 IN | BODY MASS INDEX: 26.63 KG/M2 | TEMPERATURE: 97.5 F | OXYGEN SATURATION: 98 % | HEART RATE: 56 BPM

## 2020-06-17 DIAGNOSIS — I95.9 HYPOTENSION, UNSPECIFIED HYPOTENSION TYPE: ICD-10-CM

## 2020-06-17 DIAGNOSIS — Z12.11 SPECIAL SCREENING FOR MALIGNANT NEOPLASMS, COLON: ICD-10-CM

## 2020-06-17 DIAGNOSIS — L57.0 ACTINIC KERATOSIS: ICD-10-CM

## 2020-06-17 DIAGNOSIS — D22.9 CHANGE IN MOLE: Primary | ICD-10-CM

## 2020-06-17 PROCEDURE — 88305 TISSUE EXAM BY PATHOLOGIST: CPT | Performed by: PHYSICIAN ASSISTANT

## 2020-06-17 PROCEDURE — 99213 OFFICE O/P EST LOW 20 MIN: CPT | Mod: 25 | Performed by: PHYSICIAN ASSISTANT

## 2020-06-17 PROCEDURE — 11104 PUNCH BX SKIN SINGLE LESION: CPT | Mod: 59 | Performed by: PHYSICIAN ASSISTANT

## 2020-06-17 PROCEDURE — 17003 DESTRUCT PREMALG LES 2-14: CPT | Performed by: PHYSICIAN ASSISTANT

## 2020-06-17 PROCEDURE — 17000 DESTRUCT PREMALG LESION: CPT | Performed by: PHYSICIAN ASSISTANT

## 2020-06-17 PROCEDURE — 88305 TISSUE EXAM BY PATHOLOGIST: CPT | Mod: 26 | Performed by: PHYSICIAN ASSISTANT

## 2020-06-17 ASSESSMENT — MIFFLIN-ST. JEOR: SCORE: 1408.61

## 2020-06-17 NOTE — PROGRESS NOTES
"Subjective     Jacques Sharp is a 65 year old male who presents to clinic today for the following health issues:    HPI     History of AK, has a few more on his hands, 1 on face, and a mole on his back that gets caught on things.    Bp low today, feels fine today but was lightheaded with standing 1 day last week.  He checked BP later in day and it was fine, but didn't check at time of       BP Readings from Last 3 Encounters:   06/17/20 100/60   03/09/20 138/66   02/25/20 136/64    Wt Readings from Last 3 Encounters:   06/17/20 70.8 kg (156 lb)   03/09/20 75.3 kg (166 lb)   11/20/19 72.6 kg (160 lb)         Reviewed and updated as needed this visit by Provider  Tobacco  Allergies  Meds  Problems  Med Hx  Surg Hx  Fam Hx         Review of Systems   Constitutional, cardiovascular, pulmonary, skin, systems are negative, except as otherwise noted.      Objective    /60 (BP Location: Right arm)   Pulse 56   Temp 97.5  F (36.4  C) (Tympanic)   Resp 16   Ht 1.626 m (5' 4\")   Wt 70.8 kg (156 lb)   SpO2 98%   BMI 26.78 kg/m    Body mass index is 26.78 kg/m .  Physical Exam   GENERAL: healthy, alert and no distress  PSYCH: mentation appears normal, affect normal/bright  SKIN: 10 mm AK on L and R dorsal hands, also R 5th finger, also on R eye orbit.  Benign appearing nevus 0.4 cm L lateral rib cage.          Assessment & Plan       ICD-10-CM    1. Change in mole  D22.9 Surgical pathology exam     BIOPSY SKIN/SUBQ/MUC MEM, SINGLE LESION   2. Actinic keratosis  L57.0 DESTRUCT PREMALIGNANT LESION, 2-14   3. Hypotension, unspecified hypotension type  I95.9    4. Special screening for malignant neoplasms, colon  Z12.11 GASTROENTEROLOGY ADULT REF PROCEDURE ONLY     AK treated with cryo x 2.  Benign lesion cleansed with alcohol infiltrated with 0.5 ml lidocaine with epi, and removed by shave technique.  The lesion seemed to have skin remove but then left nodule that looked like encapsulated adipose tissue, this " also removed.  Bandaid applied.  EBL 0.  Specimen sent for bx.    Patient Instructions   Monitor BPs if feeling light headed - stop lisinopril if needed  Upcoming cardiology appt    No special care needed on skin spot healing today, call if concerns      No follow-ups on file.    Celia Lu PA-C  [facility]

## 2020-06-17 NOTE — PATIENT INSTRUCTIONS
Monitor BPs if feeling light headed - stop lisinopril if needed  Upcoming cardiology appt    No special care needed on skin spot healing today, call if concerns

## 2020-06-17 NOTE — NURSING NOTE
"Chief Complaint   Patient presents with     Derm Problem     skin tag on back, 2 spots on rt hand and spot on left forearm       Initial /60 (BP Location: Right arm)   Pulse 56   Temp 97.5  F (36.4  C) (Tympanic)   Resp 16   Ht 1.626 m (5' 4\")   Wt 70.8 kg (156 lb)   SpO2 98%   BMI 26.78 kg/m   Estimated body mass index is 26.78 kg/m  as calculated from the following:    Height as of this encounter: 1.626 m (5' 4\").    Weight as of this encounter: 70.8 kg (156 lb).    Patient presents to the clinic using No DME    Health Maintenance that is potentially due pending provider review:  Colonoscopy/FIT    Gave pt phone number/pended order to schedule mammo and/or colonoscopy(or FIT)    Is there anyone who you would like to be able to receive your results? No  If yes have patient fill out AYAKA    "

## 2020-06-19 LAB — COPATH REPORT: NORMAL

## 2020-07-31 DIAGNOSIS — I25.10 CORONARY ARTERY DISEASE INVOLVING NATIVE CORONARY ARTERY OF NATIVE HEART WITHOUT ANGINA PECTORIS: ICD-10-CM

## 2020-07-31 LAB
ANION GAP SERPL CALCULATED.3IONS-SCNC: 4 MMOL/L (ref 3–14)
BUN SERPL-MCNC: 21 MG/DL (ref 7–30)
CALCIUM SERPL-MCNC: 8.8 MG/DL (ref 8.5–10.1)
CHLORIDE SERPL-SCNC: 101 MMOL/L (ref 94–109)
CHOLEST SERPL-MCNC: 139 MG/DL
CO2 SERPL-SCNC: 27 MMOL/L (ref 20–32)
CREAT SERPL-MCNC: 0.82 MG/DL (ref 0.66–1.25)
GFR SERPL CREATININE-BSD FRML MDRD: >90 ML/MIN/{1.73_M2}
GLUCOSE SERPL-MCNC: 89 MG/DL (ref 70–99)
HDLC SERPL-MCNC: 47 MG/DL
LDLC SERPL CALC-MCNC: 78 MG/DL
NONHDLC SERPL-MCNC: 92 MG/DL
POTASSIUM SERPL-SCNC: 4.2 MMOL/L (ref 3.4–5.3)
SODIUM SERPL-SCNC: 132 MMOL/L (ref 133–144)
TRIGL SERPL-MCNC: 71 MG/DL

## 2020-07-31 PROCEDURE — 80061 LIPID PANEL: CPT | Performed by: INTERNAL MEDICINE

## 2020-07-31 PROCEDURE — 36415 COLL VENOUS BLD VENIPUNCTURE: CPT | Performed by: INTERNAL MEDICINE

## 2020-07-31 PROCEDURE — 80048 BASIC METABOLIC PNL TOTAL CA: CPT | Performed by: INTERNAL MEDICINE

## 2020-08-04 ENCOUNTER — VIRTUAL VISIT (OUTPATIENT)
Dept: CARDIOLOGY | Facility: CLINIC | Age: 65
End: 2020-08-04
Attending: INTERNAL MEDICINE
Payer: COMMERCIAL

## 2020-08-04 DIAGNOSIS — I25.10 CORONARY ARTERY DISEASE INVOLVING NATIVE CORONARY ARTERY OF NATIVE HEART WITHOUT ANGINA PECTORIS: ICD-10-CM

## 2020-08-04 PROCEDURE — 99214 OFFICE O/P EST MOD 30 MIN: CPT | Mod: 95 | Performed by: INTERNAL MEDICINE

## 2020-08-04 NOTE — PATIENT INSTRUCTIONS
It was a pleasure speaking with you during our recent phone visit.  It sounds like everything with your heart is going well.  You could try decreasing the metoprolol to 12.5 mg daily.  So long as your blood pressure is running in the 130s or less and you are not experiencing any tachycardia, this should be fine.    You should be okay from a heart perspective to have foot surgery.  If you have a date for surgery, please call the cardiology clinic and we will give you instructions for holding the clopidogrel, you will need to hold it 4 to 5 days prior to surgery.    Your recent cholesterol levels looked good.  The only lab abnormality was a slightly low sodium level at 132, normal is between 135-140.  This could have been from drinking water and coffee before your labs.  I have placed an order to recheck your sodium within the next few weeks.  This can be done at any [place] lab.    Recent Labs   Lab Test 07/31/20  0725 09/17/19  2315  12/01/14  1228 01/30/14  0725   CHOL 139 134   < > 173 118   HDL 47 36*   < > 42 25*   LDL 78 64   < > 100 73   TRIG 71 170*   < > 155* 100   CHOLHDLRATIO  --   --   --  4.1 4.7    < > = values in this interval not displayed.       We would like to see you back in about 1 year in the cardiology clinic.  Our schedulers will contact you next spring or summer to arrange this appointment.

## 2020-08-04 NOTE — PROGRESS NOTES
"Jacques Sharp is a 65 year old male who is being evaluated via a billable telephone visit.      The patient has been notified of following:     \"This telephone visit will be conducted via a call between you and your physician/provider. We have found that certain health care needs can be provided without the need for a physical exam.  This service lets us provide the care you need with a short phone conversation.  If a prescription is necessary we can send it directly to your pharmacy.  If lab work is needed we can place an order for that and you can then stop by our lab to have the test done at a later time.    Telephone visits are billed at different rates depending on your insurance coverage. During this emergency period, for some insurers they may be billed the same as an in-person visit.  Please reach out to your insurance provider with any questions.    If during the course of the call the physician/provider feels a telephone visit is not appropriate, you will not be charged for this service.\"    Patient has given verbal consent for Telephone visit?  Yes    What phone number would you like to be contacted at? [phone]    How would you like to obtain your AVS? Mail Copy    Additional provider notes:  65-year-old male seen for follow-up of SVT and coronary artery disease.  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  He had a postop inferior STEMI with subsequent drug-eluting stent to the RCA and then staged intervention of the OM 2.  He was also noted to have atrial tachycardia originating near the his node and no ablation was pursued.  He has been on beta blocker therapy since then.           Echocardiogram April 2014 showed ejection fraction 50-55% with mild inferior and inferolateral hypokinesis, normal right ventricle, no significant valve disease. Nuclear stress November 2015 small fixed inferior wall defect consistent with infarct, no ischemia, ejection fraction 51% with mild " inferior hypokinesis.        September 2019 he presented to [facility] with inferior STEMI, troponin 47.  Angiogram showed 100% mid RCA lesion, 80% RPDA, 60% third RPL, 45% proximal to mid LAD, 25% proximal circumflex.  This is a late stent thrombosis of the distal RCA, repeat drug-eluting stents placed.  Echo showed EF 55%, inferior and inferolateral akinesis, normal RV, no valve disease.     He has been doing well recently.  He will bike up to 13 miles with no chest pain or shortness of breath.  He had some dizziness which improved after lisinopril was discontinued.  Blood pressure runs 120-130.  He is asking if he can decrease his metoprolol a little.  He may be having foot surgery in the near future.    Data:  Recent Labs   Lab Test 07/31/20  0725 09/17/19  2315  12/01/14  1228 01/30/14  0725   CHOL 139 134   < > 173 118   HDL 47 36*   < > 42 25*   LDL 78 64   < > 100 73   TRIG 71 170*   < > 155* 100   CHOLHDLRATIO  --   --   --  4.1 4.7    < > = values in this interval not displayed.     July 31, 2020: Sodium 132, potassium 4.2, creatinine 0.8    Assessment:  65-year-old male seen for follow-up of coronary artery disease and SVT.  He is doing well with no concerning cardiac symptoms.  Lipids are at goal.  Blood pressure is well controlled.  Sodium was a little low, this will be rechecked, he did drink some water and coffee before his labs were checked.    If he were to have foot surgery in the near future, he would be low cardiac risk.  It is been the most 1 year since his last stent.  He could hold his clopidogrel for surgery.    Recommendations:  1.  Coronary artery disease  -Continue current medications, continue clopidogrel indefinitely due to a late stent thrombosis, however okay to hold for any procedures    2.  Dyslipidemia  -Lipids at goal, continue statin    3.  History of SVT  -Patient will decrease metoprolol to 12.5 mg daily    4.  Preop cardiovascular evaluation  -Patient is at best medical management,  low cardiac risk for low to intermediate risk foot surgery.  Clopidogrel could be held 4 to 5 days prior to surgery and restarted as soon as possible afterward.  Continue metoprolol and aspirin through surgery.    Follow-up in 1 year with FLORENCIA. Recheck lipids.    Phone call duration: 16 minutes    A total of 25 minutes was spent with clinic visit, including chart review and coordinating care, >50% of time was spent talking with patient.    Alvaro Caballero MD  Cardiology - [facility]  Pager: [phone]  Text Page  August 4, 2020

## 2020-08-04 NOTE — LETTER
8/4/2020    Luis Alberto Mcgowan MD  [address] [facility] [address]    RE: Jacques Sharp       Dear Colleague,    I had the pleasure of seeing Jacques Sharp in the [facility].    Jacques Sharp is a 65 year old male who is being evaluated via a billable telephone visit.      Additional provider notes:  65-year-old male seen for follow-up of SVT and coronary artery disease.  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  He had a postop inferior STEMI with subsequent drug-eluting stent to the RCA and then staged intervention of the OM 2.  He was also noted to have atrial tachycardia originating near the his node and no ablation was pursued.  He has been on beta blocker therapy since then.           Echocardiogram April 2014 showed ejection fraction 50-55% with mild inferior and inferolateral hypokinesis, normal right ventricle, no significant valve disease. Nuclear stress November 2015 small fixed inferior wall defect consistent with infarct, no ischemia, ejection fraction 51% with mild inferior hypokinesis.        September 2019 he presented to [facility] with inferior STEMI, troponin 47.  Angiogram showed 100% mid RCA lesion, 80% RPDA, 60% third RPL, 45% proximal to mid LAD, 25% proximal circumflex.  This is a late stent thrombosis of the distal RCA, repeat drug-eluting stents placed.  Echo showed EF 55%, inferior and inferolateral akinesis, normal RV, no valve disease.     He has been doing well recently.  He will bike up to 13 miles with no chest pain or shortness of breath.  He had some dizziness which improved after lisinopril was discontinued.  Blood pressure runs 120-130.  He is asking if he can decrease his metoprolol a little.  He may be having foot surgery in the near future.    Data:  Recent Labs   Lab Test 07/31/20  0725 09/17/19  2315  12/01/14  1228 01/30/14  0725   CHOL 139 134   < > 173 118   HDL 47 36*   < > 42 25*   LDL 78 64   < > 100 73   TRIG 71  170*   < > 155* 100   CHOLHDLRATIO  --   --   --  4.1 4.7    < > = values in this interval not displayed.     July 31, 2020: Sodium 132, potassium 4.2, creatinine 0.8    Assessment:  65-year-old male seen for follow-up of coronary artery disease and SVT.  He is doing well with no concerning cardiac symptoms.  Lipids are at goal.  Blood pressure is well controlled.  Sodium was a little low, this will be rechecked, he did drink some water and coffee before his labs were checked.    If he were to have foot surgery in the near future, he would be low cardiac risk.  It is been the most 1 year since his last stent.  He could hold his clopidogrel for surgery.    Recommendations:  1.  Coronary artery disease  -Continue current medications, continue clopidogrel indefinitely due to a late stent thrombosis, however okay to hold for any procedures    2.  Dyslipidemia  -Lipids at goal, continue statin    3.  History of SVT  -Patient will decrease metoprolol to 12.5 mg daily    4.  Preop cardiovascular evaluation  -Patient is at best medical management, low cardiac risk for low to intermediate risk foot surgery.  Clopidogrel could be held 4 to 5 days prior to surgery and restarted as soon as possible afterward.  Continue metoprolol and aspirin through surgery.    Follow-up in 1 year with FLORENCIA. Recheck lipids.    Phone call duration: 16 minutes    A total of 25 minutes was spent with clinic visit, including chart review and coordinating care, >50% of time was spent talking with patient.    Alvaro Caballero MD  Cardiology - [facility]  Pager: [phone]  Text Page  August 4, 2020      Thank you for allowing me to participate in the care of your patient.    Sincerely,     Alvaro Caballero MD     [facility]

## 2020-08-12 ENCOUNTER — VIRTUAL VISIT (OUTPATIENT)
Dept: FAMILY MEDICINE | Facility: CLINIC | Age: 65
End: 2020-08-12
Payer: COMMERCIAL

## 2020-08-12 DIAGNOSIS — J06.9 UPPER RESPIRATORY TRACT INFECTION, UNSPECIFIED TYPE: Primary | ICD-10-CM

## 2020-08-12 PROCEDURE — 99213 OFFICE O/P EST LOW 20 MIN: CPT | Mod: 95 | Performed by: FAMILY MEDICINE

## 2020-08-12 NOTE — PROGRESS NOTES
"Jacques Sharp is a 65 year old male who is being evaluated via a billable telephone visit.      The patient has been notified of following:     \"This telephone visit will be conducted via a call between you and your physician/provider. We have found that certain health care needs can be provided without the need for a physical exam.  This service lets us provide the care you need with a short phone conversation.  If a prescription is necessary we can send it directly to your pharmacy.  If lab work is needed we can place an order for that and you can then stop by our lab to have the test done at a later time.    Telephone visits are billed at different rates depending on your insurance coverage. During this emergency period, for some insurers they may be billed the same as an in-person visit.  Please reach out to your insurance provider with any questions.    If during the course of the call the physician/provider feels a telephone visit is not appropriate, you will not be charged for this service.\"    Patient has given verbal consent for Telephone visit?  Yes    What phone number would you like to be contacted at? [phone]    How would you like to obtain your AVS? Mail a copy    Subjective     Jacques Sharp is a 65 year old male who presents via phone visit today for the following health issues:    HPI    Discuss symptoms sore throat, sneezing, congestion in lungs no fever or chills noted. Approx 5 days of symptoms. No COVID exposure he is aware of.  '    S: ST, sneezing, congestion, cough.  Minimal sob.  Around elderly so wants to rule out covid.     Problem list, med list, additional histories reviewed and updated, as indicated.      No fever    O:There were no vitals taken for this visit.  Alert, interactive on phone.  No wheeze or sob    A: uri sx, r/o covid    P: test ordered.    Self quarantine until we know results.      Phone visit 7 min  "

## 2020-08-14 DIAGNOSIS — J06.9 UPPER RESPIRATORY TRACT INFECTION, UNSPECIFIED TYPE: ICD-10-CM

## 2020-08-14 PROCEDURE — U0003 INFECTIOUS AGENT DETECTION BY NUCLEIC ACID (DNA OR RNA); SEVERE ACUTE RESPIRATORY SYNDROME CORONAVIRUS 2 (SARS-COV-2) (CORONAVIRUS DISEASE [COVID-19]), AMPLIFIED PROBE TECHNIQUE, MAKING USE OF HIGH THROUGHPUT TECHNOLOGIES AS DESCRIBED BY CMS-2020-01-R: HCPCS | Performed by: FAMILY MEDICINE

## 2020-08-16 LAB
SARS-COV-2 RNA SPEC QL NAA+PROBE: NOT DETECTED
SPECIMEN SOURCE: NORMAL

## 2020-09-21 ENCOUNTER — HOSPITAL ENCOUNTER (EMERGENCY)
Facility: CLINIC | Age: 65
Discharge: HOME | End: 2020-09-21
Attending: FAMILY MEDICINE | Admitting: FAMILY MEDICINE
Payer: COMMERCIAL

## 2020-09-21 ENCOUNTER — APPOINTMENT (OUTPATIENT)
Dept: GENERAL RADIOLOGY | Facility: CLINIC | Age: 65
End: 2020-09-21
Attending: FAMILY MEDICINE
Payer: COMMERCIAL

## 2020-09-21 ENCOUNTER — ALLIED HEALTH/NURSE VISIT (OUTPATIENT)
Dept: FAMILY MEDICINE | Facility: CLINIC | Age: 65
End: 2020-09-21
Payer: COMMERCIAL

## 2020-09-21 VITALS
RESPIRATION RATE: 7 BRPM | BODY MASS INDEX: 26.61 KG/M2 | DIASTOLIC BLOOD PRESSURE: 69 MMHG | TEMPERATURE: 98.6 F | HEART RATE: 56 BPM | OXYGEN SATURATION: 97 % | SYSTOLIC BLOOD PRESSURE: 115 MMHG | WEIGHT: 155 LBS

## 2020-09-21 VITALS
OXYGEN SATURATION: 97 % | HEART RATE: 131 BPM | DIASTOLIC BLOOD PRESSURE: 80 MMHG | RESPIRATION RATE: 16 BRPM | SYSTOLIC BLOOD PRESSURE: 118 MMHG

## 2020-09-21 DIAGNOSIS — R00.0 TACHYCARDIA: Primary | ICD-10-CM

## 2020-09-21 DIAGNOSIS — I48.91 ATRIAL FIBRILLATION WITH RVR (H): ICD-10-CM

## 2020-09-21 LAB
ALBUMIN SERPL-MCNC: 3.8 G/DL (ref 3.4–5)
ALP SERPL-CCNC: 59 U/L (ref 40–150)
ALT SERPL W P-5'-P-CCNC: 38 U/L (ref 0–70)
ANION GAP SERPL CALCULATED.3IONS-SCNC: 4 MMOL/L (ref 3–14)
AST SERPL W P-5'-P-CCNC: 26 U/L (ref 0–45)
BASOPHILS # BLD AUTO: 0 10E9/L (ref 0–0.2)
BASOPHILS NFR BLD AUTO: 0.4 %
BILIRUB SERPL-MCNC: 0.7 MG/DL (ref 0.2–1.3)
BUN SERPL-MCNC: 19 MG/DL (ref 7–30)
CALCIUM SERPL-MCNC: 8.7 MG/DL (ref 8.5–10.1)
CHLORIDE SERPL-SCNC: 108 MMOL/L (ref 94–109)
CO2 SERPL-SCNC: 28 MMOL/L (ref 20–32)
CREAT SERPL-MCNC: 0.74 MG/DL (ref 0.66–1.25)
DIFFERENTIAL METHOD BLD: ABNORMAL
EOSINOPHIL # BLD AUTO: 0 10E9/L (ref 0–0.7)
EOSINOPHIL NFR BLD AUTO: 0 %
ERYTHROCYTE [DISTWIDTH] IN BLOOD BY AUTOMATED COUNT: 13.1 % (ref 10–15)
GFR SERPL CREATININE-BSD FRML MDRD: >90 ML/MIN/{1.73_M2}
GLUCOSE SERPL-MCNC: 97 MG/DL (ref 70–99)
HCT VFR BLD AUTO: 45 % (ref 40–53)
HGB BLD-MCNC: 14.7 G/DL (ref 13.3–17.7)
IMM GRANULOCYTES # BLD: 0 10E9/L (ref 0–0.4)
IMM GRANULOCYTES NFR BLD: 0.2 %
LYMPHOCYTES # BLD AUTO: 1.1 10E9/L (ref 0.8–5.3)
LYMPHOCYTES NFR BLD AUTO: 20.1 %
MCH RBC QN AUTO: 26.4 PG (ref 26.5–33)
MCHC RBC AUTO-ENTMCNC: 32.7 G/DL (ref 31.5–36.5)
MCV RBC AUTO: 81 FL (ref 78–100)
MONOCYTES # BLD AUTO: 0.7 10E9/L (ref 0–1.3)
MONOCYTES NFR BLD AUTO: 13.1 %
NEUTROPHILS # BLD AUTO: 3.5 10E9/L (ref 1.6–8.3)
NEUTROPHILS NFR BLD AUTO: 66.2 %
NRBC # BLD AUTO: 0 10*3/UL
NRBC BLD AUTO-RTO: 0 /100
PLATELET # BLD AUTO: 217 10E9/L (ref 150–450)
POTASSIUM SERPL-SCNC: 4.2 MMOL/L (ref 3.4–5.3)
PROT SERPL-MCNC: 7.2 G/DL (ref 6.8–8.8)
RBC # BLD AUTO: 5.56 10E12/L (ref 4.4–5.9)
SODIUM SERPL-SCNC: 140 MMOL/L (ref 133–144)
TROPONIN I SERPL-MCNC: <0.015 UG/L (ref 0–0.04)
TSH SERPL DL<=0.005 MIU/L-ACNC: 1.34 MU/L (ref 0.4–4)
WBC # BLD AUTO: 5.3 10E9/L (ref 4–11)

## 2020-09-21 PROCEDURE — 96374 THER/PROPH/DIAG INJ IV PUSH: CPT | Performed by: FAMILY MEDICINE

## 2020-09-21 PROCEDURE — 99207 ZZC NO CHARGE NURSE ONLY: CPT

## 2020-09-21 PROCEDURE — 85025 COMPLETE CBC W/AUTO DIFF WBC: CPT | Performed by: EMERGENCY MEDICINE

## 2020-09-21 PROCEDURE — 25000125 ZZHC RX 250: Performed by: FAMILY MEDICINE

## 2020-09-21 PROCEDURE — 25000132 ZZH RX MED GY IP 250 OP 250 PS 637: Performed by: FAMILY MEDICINE

## 2020-09-21 PROCEDURE — 71046 X-RAY EXAM CHEST 2 VIEWS: CPT

## 2020-09-21 PROCEDURE — 99285 EMERGENCY DEPT VISIT HI MDM: CPT | Mod: 25 | Performed by: FAMILY MEDICINE

## 2020-09-21 PROCEDURE — 84443 ASSAY THYROID STIM HORMONE: CPT | Performed by: FAMILY MEDICINE

## 2020-09-21 PROCEDURE — 93010 ELECTROCARDIOGRAM REPORT: CPT | Mod: Z6 | Performed by: FAMILY MEDICINE

## 2020-09-21 PROCEDURE — 84484 ASSAY OF TROPONIN QUANT: CPT | Performed by: EMERGENCY MEDICINE

## 2020-09-21 PROCEDURE — 80053 COMPREHEN METABOLIC PANEL: CPT | Performed by: EMERGENCY MEDICINE

## 2020-09-21 PROCEDURE — 93005 ELECTROCARDIOGRAM TRACING: CPT | Performed by: FAMILY MEDICINE

## 2020-09-21 RX ORDER — METOPROLOL TARTRATE 1 MG/ML
5 INJECTION, SOLUTION INTRAVENOUS EVERY 5 MIN PRN
Status: DISCONTINUED | OUTPATIENT
Start: 2020-09-21 | End: 2020-09-21 | Stop reason: HOSPADM

## 2020-09-21 RX ORDER — METOPROLOL TARTRATE 25 MG/1
25 TABLET, FILM COATED ORAL ONCE
Status: COMPLETED | OUTPATIENT
Start: 2020-09-21 | End: 2020-09-21

## 2020-09-21 RX ADMIN — METOPROLOL TARTRATE 5 MG: 5 INJECTION INTRAVENOUS at 13:30

## 2020-09-21 RX ADMIN — METOPROLOL TARTRATE 25 MG: 25 TABLET, FILM COATED ORAL at 13:29

## 2020-09-21 NOTE — PROGRESS NOTES
S-(situation): Jacques is walk in to clinic with C/O feeling fatigued and heart beating fast.  States a few days ago.  States was doing painting and some wood working projects and noticed rapid heart beat.   Not short of breath in clinic.  Drove to the clinic.    B-(background): history of MI in 2013 and PST with ablation in 2013.  Takes metoprolol 12.5 mg BID. This dose was decreased a few months ago.    A-(assessment): tachycardia    PLAN:  Because of the nature of this situation, will have him transported to the ER by ambulance  Ban Calle RN

## 2020-09-21 NOTE — ED PROVIDER NOTES
HPI   The patient is a 65-year-old male presenting with palpitations and shortness of breath.  He has a known history of myocardial infarction in 2013 and then again in 2019.  He takes aspirin and Plavix.  He has a known history of paroxysmal supraventricular tachycardia with ablation in 2013.  He was told by his cardiologist about 2 months ago to decrease his metoprolol.  He has been taking 12.5 mg twice daily since that conversation.  On Friday, 3 days ago, he forgot his evening dose.  He began to feel new symptoms shortly after he missed this dose.  He denies alcohol use.  He denies drugs of abuse.  He is a former smoker.    The patient had onset of palpitations starting 3 days ago, as above.  He was sleeping when he awoke with a sense of rapid heartbeat.  This is continued with waxing and waning symptoms since that time.  He has been less symptomatic during the daytime.  He has recognized increased fatigue and intolerance to activity.  He denies shortness of breath.  He denies chest pain.  He denies feeling lightheaded or faint.  No leg pain or swelling.  No new cough or congestion.  No fever.  No trauma or injury.            Allergies:  Allergies   Allergen Reactions     Sulfa Drugs      Problem List:    Patient Active Problem List    Diagnosis Date Noted     STEMI (ST elevation myocardial infarction) (H) 09/17/2019     Priority: Medium     Paroxysmal supraventricular tachycardia (H) 02/19/2014     Priority: Medium     12/26/13:SVT RFA performed. Two foci were noted during his EP study, however only one was ablated as the second site was very close to the HIS bundle.  Complicated by MI after the procedure.          Acute MI (H) 12/24/2013     Priority: Medium     12/26/13:admitted initially for SVT ablation. He was kept overnight due to recurrent SVT and subsequently suffered an IWMI. He was taken emergently to the cath lab where he underwent thrombectomy and stent placement of the RCA. He has residual  disease of the Cx system in need of staged intervention in about 4 weeks  1/30/14:Another angiogram with another stent placed.        Hyperlipidemia LDL goal <70 02/26/2013     Priority: Medium      Past Medical History:    Past Medical History:   Diagnosis Date     Acute MI (H) 12/24/2013     Coronary artery disease      Hyperlipidaemia      Hyperlipidaemia      Paroxysmal supraventricular tachycardia (H)      Past Surgical History:    Past Surgical History:   Procedure Laterality Date     CORONARY ANGIOGRAPHY ADULT ORDER  1/30/14, 12/24/13     CV CORONARY ANGIOGRAM N/A 9/17/2019    Procedure: Coronary Angiogram;  Surgeon: Hoang Lozada MD;  Location:  [facility]     EP ABLATION / EP STUDIES  12/23/13     H ABLATION SVT  12/23/13     LUNG SURGERY      benign cyst removed.      VASECTOMY       Family History:    Family History   Problem Relation Age of Onset     Hypertension Mother      Cancer Father         lung     Cancer - colorectal No family hx of      Prostate Cancer No family hx of      Social History:  Marital Status:  Single [1]  Social History     Tobacco Use     Smoking status: Former Smoker     Smokeless tobacco: Never Used     Tobacco comment: quit 30+ years ago   Substance Use Topics     Alcohol use: No     Drug use: No      Medications:    acetaminophen (TYLENOL) 325 MG tablet  aspirin (ASA) 81 MG chewable tablet  atorvastatin (LIPITOR) 80 MG tablet  clopidogrel (PLAVIX) 75 MG tablet  metoprolol tartrate (LOPRESSOR) 25 MG tablet  nitroGLYcerin (NITROSTAT) 0.4 MG sublingual tablet  sildenafil (REVATIO) 20 MG tablet      Review of Systems   All other systems reviewed and are negative.      PE   BP: 127/88  Pulse: 137  Temp: 98.6  F (37  C)  Resp: 18  Weight: 70.3 kg (155 lb)  SpO2: 97 %  Physical Exam  Vitals signs and nursing note reviewed.   Constitutional:       General: He is not in acute distress.  HENT:      Head: Atraumatic.      Right Ear: External ear normal.      Left Ear:  External ear normal.      Nose: Nose normal.      Mouth/Throat:      Mouth: Mucous membranes are moist.      Pharynx: Oropharynx is clear.   Eyes:      General: No scleral icterus.     Extraocular Movements: Extraocular movements intact.      Conjunctiva/sclera: Conjunctivae normal.      Pupils: Pupils are equal, round, and reactive to light.   Neck:      Musculoskeletal: Normal range of motion.   Cardiovascular:      Rate and Rhythm: Tachycardia present.   Pulmonary:      Effort: Pulmonary effort is normal. No respiratory distress.   Musculoskeletal: Normal range of motion.   Skin:     General: Skin is warm and dry.   Neurological:      Mental Status: He is alert and oriented to person, place, and time.   Psychiatric:         Behavior: Behavior normal.         ED COURSE and MDM   1325.  The patient has had 3 days of palpitations with fatigue during exertion.  He has tachycardia with irregularity on the monitor.  His EKG is most likely showing atrial fibrillation with rapid rate.  No symptoms concerning for acute coronary obstruction.  Metoprolol IV and oral dosing will be given.    1429.  The patient has returned to a sinus rhythm with a rate in the 60s.  He is without symptoms.  Just prior to my entering the room he had persistent atrial fibrillation with a pulse rate of 120.  The patient will be discharged home.  Close follow-up with his primary and/your cardiologist as recommended.  Further discussion regarding atrial fibrillation should be had.  He should discuss anticoagulation.  He should discuss the use of his metoprolol at the familiar dose of 25 mg twice a day.    EKG  (1316)   Interpretation performed by me.  Rate: 140     Rhythm: Possible atrial fibrillation.     Axis: Normal.  Intervals: GA (12-2) -, QRS (<12) 130, QTc (>5) 488  P wave: -     QRS complex: Right bundle branch block.  ST segment / T-wave: T wave inversion in 3 and aVF only  Conclusion: Possible atrial fibrillation with rapid rate, right  bundle branch block, T wave inversion in leads III and aVF which is nonspecific    LABS  Labs Ordered and Resulted from Time of ED Arrival Up to the Time of Departure from the ED   CBC WITH PLATELETS DIFFERENTIAL - Abnormal; Notable for the following components:       Result Value    MCH 26.4 (*)     All other components within normal limits   COMPREHENSIVE METABOLIC PANEL   TROPONIN I   TSH WITH FREE T4 REFLEX       IMAGING  Images reviewed by me.  Radiology report also reviewed.  XR Chest 2 Views   Final Result   IMPRESSION: No acute cardiopulmonary process.      MOLINA HALL MD          Procedures    Medications   metoprolol (LOPRESSOR) injection 5 mg (5 mg Intravenous Given 9/21/20 1330)   metoprolol tartrate (LOPRESSOR) tablet 25 mg (25 mg Oral Given 9/21/20 1329)         IMPRESSION       ICD-10-CM    1. Atrial fibrillation with RVR (H)  I48.91             Medication List      There are no discharge medications for this visit.                       Raul Roca MD  09/21/20 1878

## 2020-09-21 NOTE — ED AVS SNAPSHOT
[facility] Emergency Department  [address] [address]  Phone:  [phone]  Fax:  [phone]                                    Jacques Sharp   MRN: [id number]    Department:  [facility] Emergency Department   Date of Visit:  9/21/2020           After Visit Summary Signature Page    I have received my discharge instructions, and my questions have been answered. I have discussed any challenges I see with this plan with the nurse or doctor.    ..........................................................................................................................................  Patient/Patient Representative Signature      ..........................................................................................................................................  Patient Representative Print Name and Relationship to Patient    ..................................................               ................................................  Date                                   Time    ..........................................................................................................................................  Reviewed by Signature/Title    ...................................................              ..............................................  Date                                               Time          22EPIC Rev 08/18

## 2020-09-21 NOTE — DISCHARGE INSTRUCTIONS
Return to the Emergency Room if the following occurs:     Worsened breathing, new chest pain, fainting, or for any concern at anytime.    Or, follow-up with the following provider as we discussed:     Return to your primary doctor or cardiologist for reevaluation.  While in the ER you were diagnosed with atrial fibrillation which is new for you.  He should talk to your doctor about the potential need for anticoagulation and the persistent use of metoprolol 25 mg twice a day.    Medications discussed:    Return to your familiar dose of metoprolol 25 mg twice a day.    If you received pain-relieving or sedating medication during your time in the ER, avoid alcohol, driving automobiles, or working with machinery.  Also, a responsible adult must stay with you.        Call the Nurse Advice Line at [phone] or [phone] for any concern at anytime.

## 2020-09-21 NOTE — ED TRIAGE NOTES
Intermittent palpitations and fatigue for a couple of days.  Patient has a history of MI and A-fib.  Patient denies chest pain, shortness of breath, nausea/vomiting.

## 2020-09-23 ENCOUNTER — TELEPHONE (OUTPATIENT)
Dept: CARDIOLOGY | Facility: CLINIC | Age: 65
End: 2020-09-23

## 2020-09-23 DIAGNOSIS — I21.3 ST ELEVATION MYOCARDIAL INFARCTION (STEMI), UNSPECIFIED ARTERY (H): ICD-10-CM

## 2020-09-23 NOTE — TELEPHONE ENCOUNTER
----- Message from Gladis James sent at 9/23/2020 10:30 AM CDT -----  Regarding: recent er visit.  Patient was recently in ER - was told to increase his Metoprolol. He is wondering if he needs a new prescription for the increae, or any type of follow up- PLEASE CALL PT>     ADDENDUM: Patient presented to ED 9/21/20 with tachycardia. EKG showed AFib with RVR--he eventually converted to SR on his own. Metoprolol increased and pt instructed to follow up with PCP and/or cardiology to discuss AC. Tentative phone visit made with Dr Caballero for 10/7/20 at 3:30pm. LM for patient to call back to discuss. Coral Wallace RN Cardiology September 23, 2020, 1:04 PM

## 2020-09-24 DIAGNOSIS — I47.10 PAROXYSMAL SUPRAVENTRICULAR TACHYCARDIA (H): ICD-10-CM

## 2020-09-24 RX ORDER — METOPROLOL TARTRATE 25 MG/1
25 TABLET, FILM COATED ORAL 2 TIMES DAILY
Qty: 180 TABLET | Refills: 1 | Status: SHIPPED | OUTPATIENT
Start: 2020-09-24 | End: 2021-02-16

## 2020-09-24 NOTE — TELEPHONE ENCOUNTER
Pt states that he is out of his medication because of the increase tomorrow 09/25 if you have any questions please contact pt.         Thank You,  Martín Conrad, [facility]  On behalf of [facility]

## 2020-09-24 NOTE — TELEPHONE ENCOUNTER
Patient can resume metoprolol 25 mg twice daily.  He can get a new prescription if he needs one.    Alvaro Caballero MD  Cardiology - [facility]  Pager: [phone]  Text Page  September 24, 2020    ADDENDUM: Pt called back to discuss. Confirmed follow up appt on 10/7/20 ok and confirmed new script sent to correct pharmacy. Coral Wallace RN Cardiology September 25, 2020, 10:02 AM

## 2020-09-25 RX ORDER — CLOPIDOGREL BISULFATE 75 MG/1
TABLET ORAL
Qty: 90 TABLET | Refills: 1 | Status: SHIPPED | OUTPATIENT
Start: 2020-09-25 | End: 2021-05-25

## 2020-10-05 DIAGNOSIS — I47.10 PAROXYSMAL SUPRAVENTRICULAR TACHYCARDIA (H): Primary | ICD-10-CM

## 2020-10-07 ENCOUNTER — VIRTUAL VISIT (OUTPATIENT)
Dept: CARDIOLOGY | Facility: CLINIC | Age: 65
End: 2020-10-07
Payer: COMMERCIAL

## 2020-10-07 DIAGNOSIS — I47.10 PAROXYSMAL SUPRAVENTRICULAR TACHYCARDIA (H): Primary | ICD-10-CM

## 2020-10-07 PROCEDURE — 99214 OFFICE O/P EST MOD 30 MIN: CPT | Mod: 95 | Performed by: INTERNAL MEDICINE

## 2020-10-07 NOTE — PROGRESS NOTES
"Jacques Sharp is a 65 year old male who is being evaluated via a billable telephone visit.      The patient has been notified of following:     \"This telephone visit will be conducted via a call between you and your physician/provider. We have found that certain health care needs can be provided without the need for a physical exam.  This service lets us provide the care you need with a short phone conversation.  If a prescription is necessary we can send it directly to your pharmacy.  If lab work is needed we can place an order for that and you can then stop by our lab to have the test done at a later time.    Telephone visits are billed at different rates depending on your insurance coverage. During this emergency period, for some insurers they may be billed the same as an in-person visit.  Please reach out to your insurance provider with any questions.    If during the course of the call the physician/provider feels a telephone visit is not appropriate, you will not be charged for this service.\"    Patient has given verbal consent for Telephone visit?  Yes    What phone number would you like to be contacted at? [phone]    How would you like to obtain your AVS? Mail a copy    Phone call duration: 17 minutes    Additional provider notes:  65-year-old male seen for follow-up of SVT and coronary artery disease.  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  He had a postop inferior STEMI with subsequent drug-eluting stent to the RCA and then staged intervention of the OM 2.  He was also noted to have atrial tachycardia originating near the his node and no ablation was pursued.  He has been on beta blocker therapy since then.           Echocardiogram April 2014 showed ejection fraction 50-55% with mild inferior and inferolateral hypokinesis, normal right ventricle, no significant valve disease. Nuclear stress November 2015 small fixed inferior wall defect consistent with infarct, no " ischemia, ejection fraction 51% with mild inferior hypokinesis.        September 2019 he presented to [facility] with inferior STEMI, troponin 47.  Angiogram showed 100% mid RCA lesion, 80% RPDA, 60% third RPL, 45% proximal to mid LAD, 25% proximal circumflex.  This was a late stent thrombosis of the distal RCA, repeat drug-eluting stents placed.  Echo showed EF 55%, inferior and inferolateral akinesis, normal RV, no valve disease.    September 2020 he presented to the ED with 3 days of dyspnea and palpitations. He had done some heavier work outdoors on a deck.  He denied any chest pain.  EKG showed a regular tachycardia with heart rate of 140.  He was given IV metoprolol.  He was discharged home.     Otherwise has been feeling back to his usual self.  Heart rate has been in 60s with blood pressure of 120/67 at home.  He denies any recurrent palpitations since he was in the ED.    Agree with ROS as above.    Data:  Recent Labs   Lab Test 07/31/20  0725 09/17/19  2315 12/01/14  1228 12/01/14  1228 01/30/14  0725   CHOL 139 134   < > 173 118   HDL 47 36*   < > 42 25*   LDL 78 64   < > 100 73   TRIG 71 170*   < > 155* 100   CHOLHDLRATIO  --   --   --  4.1 4.7    < > = values in this interval not displayed.     EKG, September 21, 2020: Regular tachycardia, rate 140, suspect atrial tachycardia or atrial flutter.    Assessment:  65-year-old male seen for coronary disease and SVT.  His recent arrhythmia in the ED was likely some type of SVT.  It was very regular and there was visible atrial activity.  Flutter is a possibility, however this was not atrial fibrillation.  On previous EP study he did have an atrial tach originating near the AV node that was not ablated.    We will stay on the metoprolol for now.  If he has recurrent SVT, would refer to EP to consider alternate medical therapy or discuss ablation again.    Recommendations:  1.  Paroxysmal SVT  -Continue metoprolol, if recurrent arrhythmia, refer to EP    2.  Coronary  artery disease  -Continue current medications, continue clopidogrel indefinitely due to late stent thrombosis, however okay to hold up to 5 days before any medical procedures    Follow-up already planned for summer 2021 with [facility].    Phone call duration: 17 minutes    A total of 25 minutes was spent with clinic visit, including chart review and coordinating care, >50% of time was spent talking with patient.    Alvaro Caballero MD  Cardiology - [facility]  Pager: [phone]  Text Page  October 7, 2020

## 2020-10-07 NOTE — PATIENT INSTRUCTIONS
It was a pleasure speaking with you during our recent phone visit.  As we discussed, the arrhythmia you had in the emergency room in September was probably the same arrhythmia you had back in 2013.  This was not atrial fibrillation.  Since this is the first time you have had it in many years, I would recommend continuing the metoprolol.  If this happens several more times, I would like you to see one of my electrophysiology colleagues discuss trying a different medication or considering ablation again.    Would like to see you back next summer.  Our schedulers will reach out to you to arrange a follow-up appointment.  Please call sooner with any questions or concerns.

## 2020-10-07 NOTE — LETTER
"10/7/2020    Luis Alberto Mcgowan MD  [address] [place] [id number]    RE: Jacques Sharp       Dear Colleague,    I had the pleasure of seeing Jacques Sharp in the [facility].    Jacques Sharp is a 65 year old male who is being evaluated via a billable telephone visit.      The patient has been notified of following:     \"This telephone visit will be conducted via a call between you and your physician/provider. We have found that certain health care needs can be provided without the need for a physical exam.  This service lets us provide the care you need with a short phone conversation.  If a prescription is necessary we can send it directly to your pharmacy.  If lab work is needed we can place an order for that and you can then stop by our lab to have the test done at a later time.    Telephone visits are billed at different rates depending on your insurance coverage. During this emergency period, for some insurers they may be billed the same as an in-person visit.  Please reach out to your insurance provider with any questions.    If during the course of the call the physician/provider feels a telephone visit is not appropriate, you will not be charged for this service.\"    Patient has given verbal consent for Telephone visit?  Yes    What phone number would you like to be contacted at? [phone]    How would you like to obtain your AVS? Mail a copy    Phone call duration: 17 minutes    Additional provider notes:  65-year-old male seen for follow-up of SVT and coronary artery disease.  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  He had a postop inferior STEMI with subsequent drug-eluting stent to the RCA and then staged intervention of the OM 2.  He was also noted to have atrial tachycardia originating near the his node and no ablation was pursued.  He has been on beta blocker therapy since then.           Echocardiogram April 2014 showed ejection " fraction 50-55% with mild inferior and inferolateral hypokinesis, normal right ventricle, no significant valve disease. Nuclear stress November 2015 small fixed inferior wall defect consistent with infarct, no ischemia, ejection fraction 51% with mild inferior hypokinesis.        September 2019 he presented to [facility] with inferior STEMI, troponin 47.  Angiogram showed 100% mid RCA lesion, 80% RPDA, 60% third RPL, 45% proximal to mid LAD, 25% proximal circumflex.  This was a late stent thrombosis of the distal RCA, repeat drug-eluting stents placed.  Echo showed EF 55%, inferior and inferolateral akinesis, normal RV, no valve disease.    September 2020 he presented to the ED with 3 days of dyspnea and palpitations. He had done some heavier work outdoors on a deck.  He denied any chest pain.  EKG showed a regular tachycardia with heart rate of 140.  He was given IV metoprolol.  He was discharged home.     Otherwise has been feeling back to his usual self.  Heart rate has been in 60s with blood pressure of 120/67 at home.  He denies any recurrent palpitations since he was in the ED.    Agree with ROS as above.    Data:  Recent Labs   Lab Test 07/31/20  0725 09/17/19  2315 12/01/14  1228 12/01/14  1228 01/30/14  0725   CHOL 139 134   < > 173 118   HDL 47 36*   < > 42 25*   LDL 78 64   < > 100 73   TRIG 71 170*   < > 155* 100   CHOLHDLRATIO  --   --   --  4.1 4.7    < > = values in this interval not displayed.     EKG, September 21, 2020: Regular tachycardia, rate 140, suspect atrial tachycardia or atrial flutter.    Assessment:  65-year-old male seen for coronary disease and SVT.  His recent arrhythmia in the ED was likely some type of SVT.  It was very regular and there was visible atrial activity.  Flutter is a possibility, however this was not atrial fibrillation.  On previous EP study he did have an atrial tach originating near the AV node that was not ablated.    We will stay on the metoprolol for now.  If he has  recurrent SVT, would refer to EP to consider alternate medical therapy or discuss ablation again.    Recommendations:  1.  Paroxysmal SVT  -Continue metoprolol, if recurrent arrhythmia, refer to EP    2.  Coronary artery disease  -Continue current medications, continue clopidogrel indefinitely due to late stent thrombosis, however okay to hold up to 5 days before any medical procedures    Follow-up already planned for summer 2021 with [facility].    Phone call duration: 17 minutes    A total of 25 minutes was spent with clinic visit, including chart review and coordinating care, >50% of time was spent talking with patient.    Alvaro Caballero MD  Cardiology - [facility]  Pager: [phone]  Text Page  October 7, 2020      Thank you for allowing me to participate in the care of your patient.    Sincerely,     Alvaro Caballero MD     [facility]

## 2020-10-23 ENCOUNTER — ALLIED HEALTH/NURSE VISIT (OUTPATIENT)
Dept: FAMILY MEDICINE | Facility: CLINIC | Age: 65
End: 2020-10-23
Payer: COMMERCIAL

## 2020-10-23 ENCOUNTER — VIRTUAL VISIT (OUTPATIENT)
Dept: FAMILY MEDICINE | Facility: CLINIC | Age: 65
End: 2020-10-23
Payer: COMMERCIAL

## 2020-10-23 DIAGNOSIS — N45.1 ACUTE EPIDIDYMITIS: Primary | ICD-10-CM

## 2020-10-23 PROCEDURE — 99207 PR NO CHARGE NURSE ONLY: CPT

## 2020-10-23 PROCEDURE — 99213 OFFICE O/P EST LOW 20 MIN: CPT | Mod: 95 | Performed by: NURSE PRACTITIONER

## 2020-10-23 PROCEDURE — 96372 THER/PROPH/DIAG INJ SC/IM: CPT

## 2020-10-23 RX ORDER — CEFTRIAXONE SODIUM 250 MG
250 VIAL (EA) INJECTION ONCE
Status: COMPLETED | OUTPATIENT
Start: 2020-10-23 | End: 2020-10-23

## 2020-10-23 RX ORDER — DOXYCYCLINE 100 MG/1
100 CAPSULE ORAL 2 TIMES DAILY
Qty: 28 CAPSULE | Refills: 0 | Status: SHIPPED | OUTPATIENT
Start: 2020-10-23 | End: 2020-11-06

## 2020-10-23 RX ADMIN — Medication 250 MG: at 14:31

## 2020-10-23 ASSESSMENT — ENCOUNTER SYMPTOMS
CONSTITUTIONAL NEGATIVE: 1
FREQUENCY: 0
DYSURIA: 0

## 2020-10-23 NOTE — PROGRESS NOTES
Clinic Administered Medication Documentation      Injectable Medication Documentation    Patient was given Ceftriaxone Sodium (Rocephin). Prior to medication administration, verified patients identity using patient s name and date of birth. Please see MAR and medication order for additional information. Patient instructed to remain in clinic for 15 minutes.      Was entire vial of medication used? Yes  Vial/Syringe: Single dose vial  Expiration Date:  08/2022  Was this medication supplied by the patient? No   Ban Calle RN  The following medication was given:     MEDICATION: Lidocaine 1 %  ROUTE: IM  SITE: RUQ - Gluteus  DOSE: 1 cc  LOT #: [id number]  :  [place]  EXPIRATION DATE:  07/2021  NDC#: 8473-1035-68  Ban Calle RN

## 2020-10-23 NOTE — PROGRESS NOTES
"Jacques Sharp is a 65 year old male who is being evaluated via a billable telephone visit.      The patient has been notified of following:     \"This telephone visit will be conducted via a call between you and your physician/provider. We have found that certain health care needs can be provided without the need for a physical exam.  This service lets us provide the care you need with a short phone conversation.  If a prescription is necessary we can send it directly to your pharmacy.  If lab work is needed we can place an order for that and you can then stop by our lab to have the test done at a later time.    Telephone visits are billed at different rates depending on your insurance coverage. During this emergency period, for some insurers they may be billed the same as an in-person visit.  Please reach out to your insurance provider with any questions.    If during the course of the call the physician/provider feels a telephone visit is not appropriate, you will not be charged for this service.\"    Patient has given verbal consent for Telephone visit?  Yes    What phone number would you like to be contacted at? [phone]    How would you like to obtain your AVS? Mail a copy    Subjective     Jacques Sharp is a 65 year old male who presents via phone visit today for the following health issues:    HPI     Concern - left swollen testicle  Onset: 2 days  Description: swollen testicle and prior to swelling has had some discomfort  Intensity: mild  Progression of Symptoms:  same  Accompanying Signs & Symptoms: pain with movement and swelling  Previous history of similar problem:not in the left testicle, has had Epididymitis twice in past,  antibiotics have helped in the past, last two times it was in the right testicle.   Precipitating factors:        Worsened by: movement causes more pain  Alleviating factors:        Improved by: rest is helpful, doesn't notice when just sitting  Therapies tried and outcome: " rest       Additional provider notes: Has had epididymitis in the past x 2 different episodes. Last year it was on the right side. Has had had US done both times. He also was seen by urology after the second occurrence last year. Per chart review, doxycycline alone did not clear up symptoms, he also had to end up getting Rocephin injection in office. Allergy to sulfa abx.     Now with swelling and tenderness x 2 days. No new sexual partners. No pain with urination. States he thinks urine is coming out slower. No penile discharge. No penile swelling. No testicular erythema. No lumps or masses per patient. No skin marks such as insect bite marks. No hx of prostate or testicular cancer. No known injury.     Review of Systems   Constitutional: Negative.    Genitourinary: Positive for decreased urine volume, scrotal swelling and testicular pain (tenderness). Negative for discharge, dysuria, frequency, penile pain, penile swelling and urgency.             Objective          Vitals:  No vitals were obtained today due to virtual visit.    healthy, alert, no distress and cooperative  PSYCH: Alert and oriented times 3; coherent speech, normal   rate and volume, able to articulate logical thoughts, able   to abstract reason, no tangential thoughts, no hallucinations   or delusions  His affect is normal and pleasant  RESP: No cough, no audible wheezing, able to talk in full sentences  Remainder of exam unable to be completed due to telephone visits            Assessment/Plan:    Assessment & Plan     Acute epididymitis  Suspect epididymitis given symptoms and patient description and given his history. Can't r/o torsion or any other concerns. Patient was seen virtually since it is Friday and unable to get in person appointment. Discussed options: stat US, medication, follow-up with urology. Patient wants to try medications and will go in over the weekend if symptoms worsen and/or will follow-up on Monday in clinic if symptoms  "are not improving for visit and US. Discussed risk of waiting to have US if it is torsion. Patient wants to proceed as discussed with medications at this time. Doxycycline prescribed. Side effects, risks and benefits of medication were discussed with patient. Discussed how and when to take medication. Avoid taking with dairy products. Rocephin prescribed and nursing scheduled appt for him to come in today to have injection.     Concerning signs/sx that would warrant urgent evaluation were discussed.  All questions were answered, patient understands and agrees with plan.       - doxycycline hyclate (VIBRAMYCIN) 100 MG capsule; Take 1 capsule (100 mg) by mouth 2 times daily for 14 days  - cefTRIAXone (ROCEPHIN) injection 250 mg     BMI:   Estimated body mass index is 26.61 kg/m  as calculated from the following:    Height as of 6/17/20: 1.626 m (5' 4\").    Weight as of 9/21/20: 70.3 kg (155 lb).            Patient Instructions   1. For pain take Tylenol and/or Ibuprofen as needed per bottle instructions.   2. Apply ice packs a couple times a day.   3. Elevate testicles as possible to help with comfort.  4. Take antibiotics as prescribed.  5. Appointment today to go in for antibiotic injection as well.  6. Go to the ER over the weekend if symptoms worsen.   7. Close follow-up Monday if symptoms do not improve as you will need to be seen in clinic and ultrasound ordered.          Return if symptoms worsen or fail to improve.    JOSE JUAN Mckay [facility]    Phone call duration:  14 minutes              "

## 2020-10-23 NOTE — PATIENT INSTRUCTIONS
1. For pain take Tylenol and/or Ibuprofen as needed per bottle instructions.   2. Apply ice packs a couple times a day.   3. Elevate testicles as possible to help with comfort.  4. Take antibiotics as prescribed.  5. Appointment today to go in for antibiotic injection as well.  6. Go to the ER over the weekend if symptoms worsen.   7. Close follow-up Monday if symptoms do not improve as you will need to be seen in clinic and ultrasound ordered.

## 2020-11-06 DIAGNOSIS — E78.5 HYPERLIPIDEMIA LDL GOAL <70: ICD-10-CM

## 2020-11-09 RX ORDER — ATORVASTATIN CALCIUM 80 MG/1
TABLET, FILM COATED ORAL
Qty: 90 TABLET | Refills: 3 | Status: SHIPPED | OUTPATIENT
Start: 2020-11-09 | End: 2021-12-16

## 2020-12-08 DIAGNOSIS — E78.5 HYPERLIPIDEMIA LDL GOAL <70: ICD-10-CM

## 2020-12-08 RX ORDER — ATORVASTATIN CALCIUM 80 MG/1
TABLET, FILM COATED ORAL
Qty: 90 TABLET | Refills: 3 | OUTPATIENT
Start: 2020-12-08

## 2020-12-09 ENCOUNTER — TRANSFERRED RECORDS (OUTPATIENT)
Dept: HEALTH INFORMATION MANAGEMENT | Facility: CLINIC | Age: 65
End: 2020-12-09

## 2020-12-21 ENCOUNTER — TELEPHONE (OUTPATIENT)
Dept: FAMILY MEDICINE | Facility: CLINIC | Age: 65
End: 2020-12-21

## 2020-12-21 ENCOUNTER — OFFICE VISIT (OUTPATIENT)
Dept: FAMILY MEDICINE | Facility: CLINIC | Age: 65
End: 2020-12-21
Payer: COMMERCIAL

## 2020-12-21 VITALS
SYSTOLIC BLOOD PRESSURE: 114 MMHG | WEIGHT: 155.8 LBS | TEMPERATURE: 98.2 F | HEART RATE: 57 BPM | DIASTOLIC BLOOD PRESSURE: 74 MMHG | OXYGEN SATURATION: 97 % | HEIGHT: 64 IN | RESPIRATION RATE: 12 BRPM | BODY MASS INDEX: 26.6 KG/M2

## 2020-12-21 DIAGNOSIS — I21.3 ST ELEVATION MYOCARDIAL INFARCTION (STEMI), UNSPECIFIED ARTERY (H): ICD-10-CM

## 2020-12-21 DIAGNOSIS — M79.671 BILATERAL FOOT PAIN: ICD-10-CM

## 2020-12-21 DIAGNOSIS — I25.2 HISTORY OF MI (MYOCARDIAL INFARCTION): ICD-10-CM

## 2020-12-21 DIAGNOSIS — M79.672 BILATERAL FOOT PAIN: ICD-10-CM

## 2020-12-21 DIAGNOSIS — Z23 NEED FOR PROPHYLACTIC VACCINATION AND INOCULATION AGAINST INFLUENZA: ICD-10-CM

## 2020-12-21 DIAGNOSIS — Z01.818 PREOP GENERAL PHYSICAL EXAM: Primary | ICD-10-CM

## 2020-12-21 PROCEDURE — 90471 IMMUNIZATION ADMIN: CPT | Performed by: PHYSICIAN ASSISTANT

## 2020-12-21 PROCEDURE — 90682 RIV4 VACC RECOMBINANT DNA IM: CPT | Performed by: PHYSICIAN ASSISTANT

## 2020-12-21 PROCEDURE — 99215 OFFICE O/P EST HI 40 MIN: CPT | Mod: 25 | Performed by: PHYSICIAN ASSISTANT

## 2020-12-21 ASSESSMENT — MIFFLIN-ST. JEOR: SCORE: 1407.7

## 2020-12-21 NOTE — NURSING NOTE
"Chief Complaint   Patient presents with     Pre-Op Exam       Initial /74 (BP Location: Right arm, Patient Position: Chair, Cuff Size: Adult Large)   Pulse 57   Temp 98.2  F (36.8  C) (Tympanic)   Resp 12   Ht 1.626 m (5' 4\")   Wt 70.7 kg (155 lb 12.8 oz)   SpO2 97%   BMI 26.74 kg/m   Estimated body mass index is 26.74 kg/m  as calculated from the following:    Height as of this encounter: 1.626 m (5' 4\").    Weight as of this encounter: 70.7 kg (155 lb 12.8 oz).    Patient presents to the clinic using No DME    Health Maintenance that is potentially due pending provider review:  NONE        Is there anyone who you would like to be able to receive your results? No  If yes have patient fill out AYAKA      "

## 2020-12-21 NOTE — PATIENT INSTRUCTIONS
"Hold plavix for 5 days prior to surgery, can resume right after surgery    Will get back to you on the baby aspirin    Flu shot today  Preparing for Your Surgery  Getting started  A surgery nurse will call you to review your health history and instructions. They will give you an arrival time based on your scheduled surgery time.  Please be ready to share the following:    Your doctor's clinic name and phone number    Your medical, surgical and anesthesia history    A list of allergies and sensitivities    A list of medicines, including herbal treatments and over-the-counter drugs    Whether the patient has a legal guardian (ask how to send us the papers in advance)  If your child is having surgery, please ask for a copy of Preparing for Your Child's Surgery.    Preparing for surgery    Within 30 days of surgery: Have an exam at your family clinic (primary care clinic), or go to a pre-operative clinic. This exam is called a \"History and Physical,\" or H&P.    At your H&P exam, talk to your care team about all medicines you take. If you need to stop any medicines before surgery, ask when to start taking them again.  ? We do this for your safety. Many medicines can make you bleed too much during surgery. Some change how well surgery (anesthesia) drugs work.    Call your insurance company to see what it will and won't pay for. Ask if they need to pre-approve the surgery. (If no insurance, call [phone].)    Call your surgeon's clinic if there's any change in your health. This includes signs of a cold or flu (sore throat, runny nose, cough, rash, fever). It also includes a scrape or scratch near the surgery site.    If you have questions on the day of surgery, call your surgery center.  Eating and drinking guidelines  For your safety: Unless your surgeon tells you otherwise, follow the guidelines below.    Eat and drink as usual until 8 hours before surgery. After that, no food or milk.    Drink clear liquids until 2 " hours before surgery. These are liquids you can see through, like water, Gatorade and Propel Water. You may also have black coffee and tea (no cream or milk).    Nothing by mouth within 2 hours of surgery. This includes gum, candy and breath mints.    Stop alcohol the midnight before surgery.    If your family clinic tells you to take medicine on the morning of surgery, it's okay to take it with a sip of water.  Preventing infection    Shower or bathe the night before and morning of your surgery. Follow the instructions your clinic gave you. (If no instructions, use regular soap.)    Don't shave or clip hair near your surgery site. This can lead to skin infection.    Don't smoke the morning of surgery. Smoking increases the risk of infection. You may chew nicotine gum up to 2 hours before surgery. A nicotine patch is okay.  ? Note: Some surgeries require you to completely quit smoking and nicotine. Check with your surgeon.    Your care team will make every effort to keep you safe from infection. We will:  ? Clean our hands often with soap and water (or an alcohol-based hand rub).  ? Clean the skin at your surgery site with a special soap that kills germs. We'll also remove hair from the site as needed.  ? Wear special hair covers, masks, gowns and gloves during surgery.  ? Give antibiotic medicine, if prescribed. Not all surgeries need antibiotics.  What to bring on the day of surgery    Photo ID and insurance card    Copy of your health care directive, if you have one    Glasses and hearing aides (bring cases)  ? You can't wear contacts during surgery    Inhaler and eye drops, if you use them (tell us about these when you arrive)    CPAP machine or breathing device, if you use them    A few personal items, if spending the night    If you have . . .  ? A pacemaker or ICD (cardiac defibrillator): Bring the ID card.  ? An implanted stimulator: Bring the remote control.  ? A legal guardian: Bring a copy of the  certified (court-stamped) guardianship papers.  Please remove any jewelry, including body piercings. Leave jewelry and other valuables at home.  If you're going home the day of surgery  Important: If you don't follow the rules below, we must cancel your surgery.     Arrange for someone to drive you home after surgery. You may not drive, take a taxi or take public transportation by yourself (unless you'll have local anesthesia only).    Arrange for a responsible adult to stay with you overnight. If you don't, we may keep you in the hospital overnight, and you may need to pay the costs yourself.  Questions?   If you have any questions for your care team, list them here: _________________________________________________________________________________________________________________________________________________________________________________________________________________________________________________________________________________________________________________________  For informational purposes only. Not to replace the advice of your health care provider. Copyright   2371-8131 [employer]. All rights reserved. Clinically reviewed by Madeline Martinez MD. [employer] [id number] - REV 07/19.

## 2020-12-21 NOTE — PROGRESS NOTES
[facility]  [address] [place] [id number]  Phone: [phone]  Fax: [phone]  Primary Provider: Celia Lu      PREOPERATIVE EVALUATION:  Today's date: 12/21/2020    Jacques Sharp is a 65 year old male who presents for a preoperative evaluation.    Surgical Information:  Surgery/Procedure: Big toe, bilateral  Surgery Location: [facility]  Surgeon: Dr. Potts  Surgery Date: 12/31/2020  Time of Surgery: TBD  Where patient plans to recover: At home with family  Fax number for surgical facility:     Type of Anesthesia Anticipated: to be determined    10/7/2020    Subjective     HPI related to upcoming procedure: MTP arthritis and bone cyst    Preop Questions 12/21/2020   1. Have you ever had a heart attack or stroke? YES -    2. Have you ever had surgery on your heart or blood vessels, such as a stent placement, a coronary artery bypass, or surgery on an artery in your head, neck, heart, or legs? YES -   3. Do you have chest pain with activity? No   4. Do you have a history of  heart failure? YES -    5. Do you currently have a cold, bronchitis or symptoms of other infection? No   6. Do you have a cough, shortness of breath, or wheezing? No   7. Do you or anyone in your family have previous history of blood clots? No   8. Do you or does anyone in your family have a serious bleeding problem such as prolonged bleeding following surgeries or cuts? No   9. Have you ever had problems with anemia or been told to take iron pills? No   10. Have you had any abnormal blood loss such as black, tarry or bloody stools? No   11. Have you ever had a blood transfusion? No   12. Are you willing to have a blood transfusion if it is medically needed before, during, or after your surgery? Yes   13. Have you or any of your relatives ever had problems with anesthesia? No   14. Do you have sleep apnea, excessive snoring or daytime drowsiness? UNKNOWN -   15. Do you  have any artifical heart valves or other implanted medical devices like a pacemaker, defibrillator, or continuous glucose monitor? No   16. Do you have artificial joints? YES -    17. Are you allergic to latex? No     Health Care Directive:  Patient does not have a Health Care Directive or Living Will: Discussed advance care planning with patient; information given to patient to review.    Preoperative Review of :   reviewed - no record of controlled substances prescribed.    Status of Chronic Conditions:  See problem list for active medical problems.  Problems all longstanding and stable, except as noted/documented.  See ROS for pertinent symptoms related to these conditions.    Patient has history of STEMI after AV node ablation for re-entry tachycardia.  He had another STEMI in 2019.  He just had visit with cardiology on 10/7/2020 and was cleared for this surgery.    Review of Systems  Constitutional, neuro, ENT, endocrine, pulmonary, cardiac, gastrointestinal, genitourinary, musculoskeletal, integument and psychiatric systems are negative, except as otherwise noted.    Patient Active Problem List    Diagnosis Date Noted     STEMI (ST elevation myocardial infarction) (H) 09/17/2019     Priority: Medium     In December 2013 he underwent ablation for AV node reentry tachycardia.  He had a postop inferior STEMI with subsequent drug-eluting stent to the RCA and then staged intervention of the OM 2.  He was also noted to have atrial tachycardia originating near the his node and no ablation was pursued.  He has been on beta blocker therapy since then.        September 2019 he presented to [facility] with inferior STEMI, troponin 47.  Angiogram showed 100% mid RCA lesion, 80% RPDA, 60% third RPL, 45% proximal to mid LAD, 25% proximal circumflex.  This was a late stent thrombosis of the distal RCA, repeat drug-eluting stents placed.  Echo showed EF 55%, inferior and inferolateral akinesis, normal RV, no valve disease.        Paroxysmal supraventricular tachycardia (H) 02/19/2014     Priority: Medium     12/26/13:SVT RFA performed. Two foci were noted during his EP study, however only one was ablated as the second site was very close to the HIS bundle.  Complicated by MI after the procedure.          Acute MI (H) 12/24/2013     Priority: Medium     12/26/13:admitted initially for SVT ablation. He was kept overnight due to recurrent SVT and subsequently suffered an IWMI. He was taken emergently to the cath lab where he underwent thrombectomy and stent placement of the RCA. He has residual disease of the Cx system in need of staged intervention in about 4 weeks  1/30/14:Another angiogram with another stent placed.        Hyperlipidemia LDL goal <70 02/26/2013     Priority: Medium      Past Medical History:   Diagnosis Date     Acute MI (H) 12/24/2013     Coronary artery disease      Hyperlipidaemia      Hyperlipidaemia      Paroxysmal supraventricular tachycardia (H)      Past Surgical History:   Procedure Laterality Date     CORONARY ANGIOGRAPHY ADULT ORDER  1/30/14, 12/24/13     CV CORONARY ANGIOGRAM N/A 9/17/2019    Procedure: Coronary Angiogram;  Surgeon: Hoang Lozada MD;  Location:  HEART CARDIAC CATH LAB     EP ABLATION / EP STUDIES  12/23/13     H ABLATION SVT  12/23/13     LUNG SURGERY      benign cyst removed.      VASECTOMY       Current Outpatient Medications   Medication Sig Dispense Refill     aspirin (ASA) 81 MG chewable tablet CHEW AND SWALLOW ONE TABLET BY MOUTH ONCE DAILY 90 tablet 3     atorvastatin (LIPITOR) 80 MG tablet TAKE ONE TABLET BY MOUTH ONCE DAILY 90 tablet 3     clopidogrel (PLAVIX) 75 MG tablet TAKE ONE TABLET BY MOUTH ONCE DAILY 90 tablet 1     metoprolol tartrate (LOPRESSOR) 25 MG tablet Take 1 tablet (25 mg) by mouth 2 times daily 180 tablet 1     acetaminophen (TYLENOL) 325 MG tablet Take 1-2 tablets (325-650 mg) by mouth every 4 hours as needed 100 tablet      nitroGLYcerin (NITROSTAT) 0.4 MG  "sublingual tablet Place 1 tablet (0.4 mg) under the tongue every 5 minutes as needed for chest pain 60 tablet 0     sildenafil (REVATIO) 20 MG tablet Take 1 tablet (20 mg) by mouth daily as needed (erectile dysfunction) (Patient not taking: Reported on 12/21/2020) 25 tablet 3       Allergies   Allergen Reactions     Sulfa Drugs         Social History     Tobacco Use     Smoking status: Former Smoker     Smokeless tobacco: Never Used     Tobacco comment: quit 30+ years ago   Substance Use Topics     Alcohol use: No     Family History   Problem Relation Age of Onset     Hypertension Mother      Cancer Father         lung     Cancer - colorectal No family hx of      Prostate Cancer No family hx of      History   Drug Use No         Objective     /74 (BP Location: Right arm, Patient Position: Chair, Cuff Size: Adult Large)   Pulse 57   Temp 98.2  F (36.8  C) (Tympanic)   Resp 12   Ht 1.626 m (5' 4\")   Wt 70.7 kg (155 lb 12.8 oz)   SpO2 97%   BMI 26.74 kg/m      Physical Exam    GENERAL APPEARANCE: healthy, alert and no distress     EYES: EOMI,  PERRL     HENT: ear canals and TM's normal and nose and mouth without ulcers or lesions     NECK: no adenopathy, no asymmetry, masses, or scars and thyroid normal to palpation     RESP: lungs clear to auscultation - no rales, rhonchi or wheezes     CV: regular rates and rhythm, normal S1 S2, no S3 or S4 and no murmur, click or rub     ABDOMEN:  soft, nontender, no HSM or masses and bowel sounds normal     MS: extremities normal- no gross deformities noted, no evidence of inflammation in joints, FROM in all extremities.     SKIN: no suspicious lesions or rashes     NEURO: Normal strength and tone, sensory exam grossly normal, mentation intact and speech normal     PSYCH: mentation appears normal. and affect normal/bright     LYMPHATICS: No cervical adenopathy    Recent Labs   Lab Test 09/21/20  1308 07/31/20  0725 09/18/19  0655 09/18/19  0655 09/18/19  0357   HGB " 14.7  --   --  13.6  --      --   --  201  --     132*   < >  --  140   POTASSIUM 4.2 4.2   < >  --  3.5   CR 0.74 0.82   < >  --  0.88   A1C  --   --   --   --  5.9*    < > = values in this interval not displayed.        Diagnostics:  No labs were ordered during this visit.  CBC, CMP normal 9/21/2020   No EKG this visit, completed in the last 90 days.    Revised Cardiac Risk Index (RCRI):  The patient has the following serious cardiovascular risks for perioperative complications:   - Coronary Artery Disease (MI, positive stress test, angina, Qs on EKG) = 1 point     RCRI Interpretation: 1 point: Class II (low risk - 0.9% complication rate)       Assessment & Plan   The proposed surgical procedure is considered LOW risk.    Jacques was seen today for pre-op exam and imm/inj.    Diagnoses and all orders for this visit:    Preop general physical exam    Bilateral foot pain    History of MI (myocardial infarction)    Need for prophylactic vaccination and inoculation against influenza  -     INFLUENZA QUAD, RECOMBINANT, P-FREE (RIV4) (FLUBLOCK) [58506]    ST elevation myocardial infarction (STEMI), unspecified artery (H)           Risks and Recommendations:  The patient has the following additional risks and recommendations for perioperative complications:  Cardiovascular:   - Cardiology cleared patient 10/7/2020    Medication Instructions:   - clopidrogel (Plavix), prasugrel (Effient), ticagrelor (Brilinta): No contraindication to stopping Plavix, HOLD 5-7 days before surgery.      He will CONTINUE his daily aspirin 81 mg.    RECOMMENDATION:  APPROVAL GIVEN to proceed with proposed procedure, without further diagnostic evaluation.    Signed Electronically by: Celia Lu PA-C    Copy of this evaluation report is provided to requesting physician.    Preop [facility] Preop Guidelines    Revised Cardiac Risk Index    Patient Instructions     Hold plavix for 5 days prior to surgery,  "can resume right after surgery    Will get back to you on the baby aspirin    Flu shot today  Preparing for Your Surgery  Getting started  A surgery nurse will call you to review your health history and instructions. They will give you an arrival time based on your scheduled surgery time.  Please be ready to share the following:    Your doctor's clinic name and phone number    Your medical, surgical and anesthesia history    A list of allergies and sensitivities    A list of medicines, including herbal treatments and over-the-counter drugs    Whether the patient has a legal guardian (ask how to send us the papers in advance)  If your child is having surgery, please ask for a copy of Preparing for Your Child's Surgery.    Preparing for surgery    Within 30 days of surgery: Have an exam at your family clinic (primary care clinic), or go to a pre-operative clinic. This exam is called a \"History and Physical,\" or H&P.    At your H&P exam, talk to your care team about all medicines you take. If you need to stop any medicines before surgery, ask when to start taking them again.  ? We do this for your safety. Many medicines can make you bleed too much during surgery. Some change how well surgery (anesthesia) drugs work.    Call your insurance company to see what it will and won't pay for. Ask if they need to pre-approve the surgery. (If no insurance, call [phone].)    Call your surgeon's clinic if there's any change in your health. This includes signs of a cold or flu (sore throat, runny nose, cough, rash, fever). It also includes a scrape or scratch near the surgery site.    If you have questions on the day of surgery, call your surgery center.  Eating and drinking guidelines  For your safety: Unless your surgeon tells you otherwise, follow the guidelines below.    Eat and drink as usual until 8 hours before surgery. After that, no food or milk.    Drink clear liquids until 2 hours before surgery. These are liquids " you can see through, like water, Gatorade and Propel Water. You may also have black coffee and tea (no cream or milk).    Nothing by mouth within 2 hours of surgery. This includes gum, candy and breath mints.    Stop alcohol the midnight before surgery.    If your family clinic tells you to take medicine on the morning of surgery, it's okay to take it with a sip of water.  Preventing infection    Shower or bathe the night before and morning of your surgery. Follow the instructions your clinic gave you. (If no instructions, use regular soap.)    Don't shave or clip hair near your surgery site. This can lead to skin infection.    Don't smoke the morning of surgery. Smoking increases the risk of infection. You may chew nicotine gum up to 2 hours before surgery. A nicotine patch is okay.  ? Note: Some surgeries require you to completely quit smoking and nicotine. Check with your surgeon.    Your care team will make every effort to keep you safe from infection. We will:  ? Clean our hands often with soap and water (or an alcohol-based hand rub).  ? Clean the skin at your surgery site with a special soap that kills germs. We'll also remove hair from the site as needed.  ? Wear special hair covers, masks, gowns and gloves during surgery.  ? Give antibiotic medicine, if prescribed. Not all surgeries need antibiotics.  What to bring on the day of surgery    Photo ID and insurance card    Copy of your health care directive, if you have one    Glasses and hearing aides (bring cases)  ? You can't wear contacts during surgery    Inhaler and eye drops, if you use them (tell us about these when you arrive)    CPAP machine or breathing device, if you use them    A few personal items, if spending the night    If you have . . .  ? A pacemaker or ICD (cardiac defibrillator): Bring the ID card.  ? An implanted stimulator: Bring the remote control.  ? A legal guardian: Bring a copy of the certified (court-stamped) guardianship  papers.  Please remove any jewelry, including body piercings. Leave jewelry and other valuables at home.  If you're going home the day of surgery  Important: If you don't follow the rules below, we must cancel your surgery.     Arrange for someone to drive you home after surgery. You may not drive, take a taxi or take public transportation by yourself (unless you'll have local anesthesia only).    Arrange for a responsible adult to stay with you overnight. If you don't, we may keep you in the hospital overnight, and you may need to pay the costs yourself.  Questions?   If you have any questions for your care team, list them here: _________________________________________________________________________________________________________________________________________________________________________________________________________________________________________________________________________________________________________________________  For informational purposes only. Not to replace the advice of your health care provider. Copyright   2140-1352 [employer]. All rights reserved. Clinically reviewed by Madeline Martinez MD. [employer] [id number] - REV 07/19.

## 2020-12-21 NOTE — TELEPHONE ENCOUNTER
Jacques [address] Dr was in to see me for a pre-op today for an upcoming bone surgery for his feet.  Your 10/7/20 virtual visit note with him references that it is ok to hold plavix for 5 days for upcoming surgery, but I did not see any mention regarding holding the aspirin 81 mg?  Did you prefer for him to continue this medication, or are you ok with him holding it?    Surgery is 12/31 so I appreciate a prompt response.      [facility] PA

## 2020-12-22 NOTE — TELEPHONE ENCOUNTER
Notify patient that cardiology DOES for sure want him to stay on his aspirin, to hold ONLY plavix.        Please also fax pre op from yesterday including 10/5 EKG.

## 2020-12-22 NOTE — TELEPHONE ENCOUNTER
Pt informed/ advised as noted per Celia. Voices understanding.   CSS- please fax pre- op including 10/05 EKG.   BETSY Gallego RN

## 2020-12-22 NOTE — TELEPHONE ENCOUNTER
He should stay on his ASA with his history of multiple stents.    Alvaro Caballero MD  Cardiology - [facility]  Pager: [phone]  Text Page  December 22, 2020

## 2020-12-28 ENCOUNTER — TELEPHONE (OUTPATIENT)
Dept: FAMILY MEDICINE | Facility: CLINIC | Age: 65
End: 2020-12-28

## 2020-12-28 NOTE — TELEPHONE ENCOUNTER
Reason for Call:  Other call back    Detailed comments: missed a call from Tuesday please call back    Phone Number Patient can be reached at: Home number on file [phone] (home)    Best Time: anytime    Can we leave a detailed message on this number? YES    Call taken on 12/28/2020 at 10:28 AM by Giselle Rogers

## 2020-12-28 NOTE — TELEPHONE ENCOUNTER
Jacques is calling.  States he received a call from a nurse and is calling back. We reviewed last TE notes regarding pre op, surgery, that cardiology DOES for sure want him to stay on his aspirin, to hold ONLY plavix.   And that 10/5 EKG was faxed also.  He wants to make sure that he is not missing anything else prior to surgery.  Please call Jacques back to confirm.  Di Martinez RN

## 2021-01-13 ENCOUNTER — TRANSFERRED RECORDS (OUTPATIENT)
Dept: HEALTH INFORMATION MANAGEMENT | Facility: CLINIC | Age: 66
End: 2021-01-13

## 2021-01-15 DIAGNOSIS — N52.9 ERECTILE DYSFUNCTION, UNSPECIFIED ERECTILE DYSFUNCTION TYPE: ICD-10-CM

## 2021-01-15 RX ORDER — SILDENAFIL CITRATE 20 MG/1
20 TABLET ORAL DAILY PRN
Qty: 25 TABLET | Refills: 3 | Status: SHIPPED | OUTPATIENT
Start: 2021-01-15 | End: 2024-05-28

## 2021-01-15 NOTE — TELEPHONE ENCOUNTER
"Routing refill request to provider for review/approval because: Failing protocol  Last ordered by Cardiology   Requested Prescriptions   Pending Prescriptions Disp Refills     sildenafil (REVATIO) 20 MG tablet 25 tablet 3     Sig: Take 1 tablet (20 mg) by mouth daily as needed (erectile dysfunction)       Erectile Dysfuction Protocol Failed - 1/15/2021  2:19 PM        Failed - Absence of nitrates on medication list        Passed - Absence of Alpha Blockers on Med list        Passed - Recent (12 mo) or future (30 days) visit within the authorizing provider's specialty     Patient has had an office visit with the authorizing provider or a provider within the authorizing providers department within the previous 12 mos or has a future within next 30 days. See \"Patient Info\" tab in inbasket, or \"Choose Columns\" in Meds & Orders section of the refill encounter.              Passed - Medication is active on med list        Passed - Patient is age 18 or older           Loan WAGNER RN, BSN      "

## 2021-02-09 ENCOUNTER — TRANSFERRED RECORDS (OUTPATIENT)
Dept: HEALTH INFORMATION MANAGEMENT | Facility: CLINIC | Age: 66
End: 2021-02-09

## 2021-02-16 DIAGNOSIS — I21.3 ST ELEVATION MYOCARDIAL INFARCTION (STEMI), UNSPECIFIED ARTERY (H): ICD-10-CM

## 2021-02-16 RX ORDER — METOPROLOL TARTRATE 25 MG/1
25 TABLET, FILM COATED ORAL 2 TIMES DAILY
Qty: 180 TABLET | Refills: 3 | Status: SHIPPED | OUTPATIENT
Start: 2021-02-16 | End: 2021-09-10 | Stop reason: ALTCHOICE

## 2021-05-24 DIAGNOSIS — I47.10 PAROXYSMAL SUPRAVENTRICULAR TACHYCARDIA (H): ICD-10-CM

## 2021-05-25 RX ORDER — CLOPIDOGREL BISULFATE 75 MG/1
TABLET ORAL
Qty: 90 TABLET | Refills: 0 | Status: SHIPPED | OUTPATIENT
Start: 2021-05-25 | End: 2021-08-30

## 2021-06-06 ENCOUNTER — OFFICE VISIT (OUTPATIENT)
Dept: URGENT CARE | Facility: URGENT CARE | Age: 66
End: 2021-06-06
Payer: COMMERCIAL

## 2021-06-06 VITALS
WEIGHT: 165 LBS | OXYGEN SATURATION: 98 % | HEART RATE: 84 BPM | TEMPERATURE: 97.4 F | SYSTOLIC BLOOD PRESSURE: 173 MMHG | DIASTOLIC BLOOD PRESSURE: 89 MMHG | RESPIRATION RATE: 16 BRPM | BODY MASS INDEX: 28.32 KG/M2

## 2021-06-06 DIAGNOSIS — N45.1 EPIDIDYMITIS, RIGHT: Primary | ICD-10-CM

## 2021-06-06 PROCEDURE — 99213 OFFICE O/P EST LOW 20 MIN: CPT | Performed by: EMERGENCY MEDICINE

## 2021-06-06 RX ORDER — CIPROFLOXACIN 500 MG/1
500 TABLET, FILM COATED ORAL 2 TIMES DAILY
Qty: 28 TABLET | Refills: 0 | Status: SHIPPED | OUTPATIENT
Start: 2021-06-06 | End: 2021-06-20

## 2021-06-06 NOTE — PROGRESS NOTES
Assessment: Epididymitis right testicle of 1 week duration.  Afebrile.  Minimal tenderness on exam.    Plan: Cipro 5 mg twice daily for 14 days.    HPI: Patient is a 66-year-old male who presents with a 1 week history of right testicle pain.  He notes a tender area of the posterior superior aspect of the testicle.  He feels this is consistent with previous epididymitis which he has had twice in the past.  No penile drainage or obvious STI.      ROS: See HPI otherwise normal.    Allergies   Allergen Reactions     Sulfa Drugs       Current Outpatient Medications   Medication Sig Dispense Refill     acetaminophen (TYLENOL) 325 MG tablet Take 1-2 tablets (325-650 mg) by mouth every 4 hours as needed 100 tablet      aspirin (ASA) 81 MG chewable tablet CHEW AND SWALLOW ONE TABLET BY MOUTH ONCE DAILY 90 tablet 3     atorvastatin (LIPITOR) 80 MG tablet TAKE ONE TABLET BY MOUTH ONCE DAILY 90 tablet 3     ciprofloxacin (CIPRO) 500 MG tablet Take 1 tablet (500 mg) by mouth 2 times daily for 14 days 28 tablet 0     clopidogrel (PLAVIX) 75 MG tablet TAKE ONE TABLET BY MOUTH ONCE DAILY 90 tablet 0     metoprolol tartrate (LOPRESSOR) 25 MG tablet Take 1 tablet (25 mg) by mouth 2 times daily 180 tablet 3     nitroGLYcerin (NITROSTAT) 0.4 MG sublingual tablet Place 1 tablet (0.4 mg) under the tongue every 5 minutes as needed for chest pain 60 tablet 0     sildenafil (REVATIO) 20 MG tablet Take 1 tablet (20 mg) by mouth daily as needed (erectile dysfunction) DO NOT USE WITHIN 24 HOURS OF NITROGLYCERIN 25 tablet 3         PE: Physical exam reveals a 66-year-old male to be in no acute distress.  Vital signs reviewed.  He is afebrile.  Examination of his right testicle reveals no enlargement.  He is tender over the epididymis which is slightly enlarged in that location only.  Left testicle structures are normal nontender.        TREATMENT: None.      ASSESSMENT: Epididymitis right testicle relatively mild in nature.      DIAGNOSIS:  Epididymitis right testicle      PLAN: Cipro 500 mg twice daily for 14 days.  Symptomatic instructions discussed.  Follow-up if worsening symptoms or no improvement in 14 days.

## 2021-06-06 NOTE — PATIENT INSTRUCTIONS
Scrotal support or wear a jock if pain    Warm compresses 2-3 times daily or sit in tub    Cipro 500 mg twice daily for 14 days.    Recheck if worsening pain or fever.    Recheck 14 days if no improvement.  Patient Education     Treating Epididymitis and Orchitis    You have inflammation of the epididymis (epididymitis) and testicle (orchitis). This is likely from an infection. In many cases, epididymitis and orchitis occur along with urinary tract infections. Treatment includes medicine to get rid of the infection. It also includes medicine and other methods to ease symptoms.   Possible treatments  Antibiotics. Acute epididymitis is most often treated with oral antibiotics. You may also be given a shot (injection) of antibiotics. Be sure to take all of your medicine until it is gone, even if you feel better.  Anti-inflammatories. You may be prescribed medicine to reduce swelling and tenderness.  Rest. You will most likely need to rest for 3 to 4 days until swelling and fever are gone. When you are able, lie down with a towel folded under the scrotum to raise it slightly. This can help ease mild pain.  Scrotal support. If your testicles are swollen, wear an athletic supporter (jockstrap) or spandex shorts. This may help control swelling and ease symptoms.  Ice and heat. To ease swelling, use an ice pack wrapped in a thin towel on the scrotum. Once swelling is gone, sit in a warm bath to increase blood flow to the area. To make an ice pack, put ice in a plastic bag that seals at the top. Wrap the bag in a clean, thin towel. Never put an ice pack directly on the skin.    After treatment  The inflammation will go away with treatment. But you may have an achy feeling in the testicles for 2 to 4 weeks. This does not mean the infection has come back. The testicles just take time to heal. But if you feel a lump in a testicle after treatment, see your healthcare provider. Once the inflammation is gone, you can be active  again.   If you are sexually active, your sex partner needs to see a healthcare provider as well. This is because sex can sometimes spread the infection that causes this condition.   [employer] last reviewed this educational content on 12/1/2019 2000-2021 [employer]. All rights reserved. This information is not intended as a substitute for professional medical care. Always follow your healthcare professional's instructions.           Patient Education     Epididymitis   Inflammation of the epididymis can cause pain and swelling in your scrotum. The epididymis is a small tube next to the testicle that stores sperm. Epididymitis is often caused by an infection. In sexually active men, it is often caused by a sexually transmitted infection (STI) such as chlamydia or gonorrhea. In boys and in men over 40, it can be from bacteria from other parts of the urinary tract (not an STI infection).  Symptoms may begin with pain in the lower belly (abdomen) or low back. The pain then spreads down into the scrotum. Often only one side is affected. The testicle and scrotum swell and become very painful and red. You may have fever and a burning when passing urine. Sometimes you may have a discharge from the penis.  Treatment is with antibiotics, and anti-inflammatory and pain medicines. The condition should get better over the first few days of treatment. But it will take several weeks for all the swelling and mild pain to go away. If your healthcare provider thinks that an STI is the cause, your sexual partners may need to be treated.  Home care  Here are some tips to help you care for yourself at home:  Support the scrotum. When lying down, place a rolled towel under the scrotum. When walking, use an athletic supporter or 2 pairs of jockey-style underwear.  To ease pain, put ice packs on the inflamed area. To make an ice pack, put ice cubes in a plastic bag that seals at the top. Wrap the bag in a clean,  thin towel. Never put an ice pack directly on the skin.  Take pain medicine as directed. You may use over-the-counter medicines to control pain, unless another medicine was given. If you have long-term (chronic) liver or kidney disease, talk with your healthcare provider before taking these medicines. Also talk with your provider if you've ever had a stomach ulcer or GI (gastrointestinal) bleeding.  Get some rest.  Rest in bed for the first few days until the fever, pain, and swelling get better. It may take several weeks for all of the swelling to go away.  Prevent constipation. Constipation can make you strain. This makes the pain worse. Prevent constipation by eating natural laxatives. These include prunes, fresh fruits, and whole-grain cereals. If needed, use a mild over-the-counter laxative for constipation. Mineral oil can be used to keep the stools soft.  Wait to have sex. Don't have sex until you have finished all treatment and all symptoms have cleared.  Take all medicine as directed. Don't miss any doses. And don't stop taking your medicine early, even if you feel better.  Follow-up care  Follow up with your healthcare provider, or as advised, to be sure you are responding correctly to treatment. If a culture was taken, you may call for the result as directed. A culture test can ensure that you are on the correct antibiotic.   When to seek medical advice  Call your healthcare provider right away if any of these occur:  Fever of 100.4 F (38 C) or higher, or as directed by your provider  More pain or swelling of the testicle after starting treatment  Pressure or pain in your bladder that gets worse  Unable to pass urine for 8 hours  [employer] last reviewed this educational content on 9/1/2019 2000-2021 [employer]. All rights reserved. This information is not intended as a substitute for professional medical care. Always follow your healthcare professional's instructions.

## 2021-07-19 ENCOUNTER — TELEPHONE (OUTPATIENT)
Dept: CARDIOLOGY | Facility: CLINIC | Age: 66
End: 2021-07-19

## 2021-07-29 ENCOUNTER — TELEPHONE (OUTPATIENT)
Dept: CARDIOLOGY | Facility: CLINIC | Age: 66
End: 2021-07-29

## 2021-07-29 NOTE — TELEPHONE ENCOUNTER
[place] [facility] MEASURES BLOOD PRESSURE RECHECK      Last office visit: 6/6/21    Previous blood pressure: 173/89 mm Hg  Previous heart rate: 89 bpm        Home monitor blood pressure: 118/62 mmHg  Home monitor heart rate: n/a bpm      Additional Comments: n/a      Results routed to: n/a

## 2021-08-27 DIAGNOSIS — I47.10 PAROXYSMAL SUPRAVENTRICULAR TACHYCARDIA (H): ICD-10-CM

## 2021-08-30 RX ORDER — CLOPIDOGREL BISULFATE 75 MG/1
TABLET ORAL
Qty: 90 TABLET | Refills: 0 | Status: SHIPPED | OUTPATIENT
Start: 2021-08-30 | End: 2021-12-16

## 2021-09-08 ENCOUNTER — TELEPHONE (OUTPATIENT)
Dept: CARDIOLOGY | Facility: CLINIC | Age: 66
End: 2021-09-08

## 2021-09-08 NOTE — TELEPHONE ENCOUNTER
Called pt, reviewed Dr. Caballero recommendations. Able to schedule pt with RAHUL Weaver on Friday 9/10 due to cancellation. Pt will need labs and EKG as well. Messaged scheduling to arrange prior to 9/10 visit.     Sherrill Bentley RN, BSN  09/08/21 at 2:38 PM

## 2021-09-08 NOTE — TELEPHONE ENCOUNTER
Patient left message wanting to discuss irregular heart beat. Please call.  8:14 AM 09/08/21 JD Lopez CMA

## 2021-09-08 NOTE — TELEPHONE ENCOUNTER
Called pt, he reports over the weekend he started noticing irregular heart beat. He reports a week or two before that, he forgot his am pills (including Metoprolol) 2-2 times, unsure if that is related. Since this weekend he has not missed a dose. Pt reports heart rate is not fast, it is actually slower in 50-60's. He reports at times it feels normal, then it will start skipping or feeling irregular. Worse at night, but is off and on since the weekend. He has not had anything like this since last year when he was in ER for PSVT. Pt is on ASA 81mg daily, Plavix 75mg daily, and Lopressor 25mg BID. Pt denies associated symptoms, denies SOB, chest pain or dizziness. Pt is due for follow up. Next available follow up in [place] clinic is not until 9/15 with RAHUL Weaver. Held this slot. Will message Dr. Caballero for review.     Sherrill Bentley RN, BSN  09/08/21 at 11:07 AM

## 2021-09-09 ENCOUNTER — LAB (OUTPATIENT)
Dept: LAB | Facility: CLINIC | Age: 66
End: 2021-09-09
Payer: COMMERCIAL

## 2021-09-09 DIAGNOSIS — I25.10 CORONARY ARTERY DISEASE INVOLVING NATIVE CORONARY ARTERY OF NATIVE HEART WITHOUT ANGINA PECTORIS: ICD-10-CM

## 2021-09-09 LAB
CHOLEST SERPL-MCNC: 133 MG/DL
HDLC SERPL-MCNC: 44 MG/DL
LDLC SERPL CALC-MCNC: 69 MG/DL
NONHDLC SERPL-MCNC: 89 MG/DL
TRIGL SERPL-MCNC: 102 MG/DL

## 2021-09-09 PROCEDURE — 80061 LIPID PANEL: CPT

## 2021-09-09 PROCEDURE — 36415 COLL VENOUS BLD VENIPUNCTURE: CPT

## 2021-09-10 ENCOUNTER — OFFICE VISIT (OUTPATIENT)
Dept: CARDIOLOGY | Facility: CLINIC | Age: 66
End: 2021-09-10
Attending: INTERNAL MEDICINE
Payer: COMMERCIAL

## 2021-09-10 ENCOUNTER — HOSPITAL ENCOUNTER (OUTPATIENT)
Dept: CARDIOLOGY | Facility: CLINIC | Age: 66
Discharge: HOME | End: 2021-09-10
Attending: INTERNAL MEDICINE | Admitting: INTERNAL MEDICINE
Payer: COMMERCIAL

## 2021-09-10 VITALS
SYSTOLIC BLOOD PRESSURE: 120 MMHG | HEART RATE: 63 BPM | DIASTOLIC BLOOD PRESSURE: 73 MMHG | WEIGHT: 161 LBS | BODY MASS INDEX: 27.64 KG/M2

## 2021-09-10 DIAGNOSIS — I10 BENIGN ESSENTIAL HYPERTENSION: ICD-10-CM

## 2021-09-10 DIAGNOSIS — R00.2 PALPITATIONS: Primary | ICD-10-CM

## 2021-09-10 DIAGNOSIS — I49.3 PVC'S (PREMATURE VENTRICULAR CONTRACTIONS): ICD-10-CM

## 2021-09-10 DIAGNOSIS — I25.10 CORONARY ARTERY DISEASE INVOLVING NATIVE CORONARY ARTERY OF NATIVE HEART WITHOUT ANGINA PECTORIS: ICD-10-CM

## 2021-09-10 DIAGNOSIS — E78.5 HYPERLIPIDEMIA LDL GOAL <70: ICD-10-CM

## 2021-09-10 DIAGNOSIS — I47.10 PAROXYSMAL SUPRAVENTRICULAR TACHYCARDIA (H): ICD-10-CM

## 2021-09-10 PROCEDURE — 99214 OFFICE O/P EST MOD 30 MIN: CPT | Performed by: NURSE PRACTITIONER

## 2021-09-10 PROCEDURE — 93005 ELECTROCARDIOGRAM TRACING: CPT | Performed by: CLINICAL EXERCISE PHYSIOLOGIST

## 2021-09-10 PROCEDURE — 93010 ELECTROCARDIOGRAM REPORT: CPT | Performed by: INTERNAL MEDICINE

## 2021-09-10 RX ORDER — METOPROLOL SUCCINATE 50 MG/1
50 TABLET, EXTENDED RELEASE ORAL DAILY
Qty: 30 TABLET | Refills: 11 | Status: SHIPPED | OUTPATIENT
Start: 2021-09-10 | End: 2021-10-08

## 2021-09-10 NOTE — PATIENT INSTRUCTIONS
Medication Changes:  Change to metoprolol XL and increase to 50 mg daily     Recommendations:  1. Check blood pressure at least 1 hour after medications. Call the clinic if your blood pressure is consistently greater than 130/80.   2. Call if blood pressure is less than 90 on top or less than 100 with lightheadedness.    3. Decrease caffeine, alcohol, stress to help with any early beats of the heart    Follow-up:  24 hr holter monitor in 1 week   See Kelsy KAY for cardiology follow up at [place]: 1 month      Cardiology Scheduling~[phone]  Cardiology Clinic RN~[phone] (Coral Wallace, KLARISSA)

## 2021-09-10 NOTE — LETTER
9/10/2021    Celia Lu PA-C  [address] [facility] [address]    RE: Jacques Sharp       Dear Colleague,    I had the pleasure of seeing Jacques Sharp in the [facility].    Cardiology Clinic Progress Note  Jacques Sharp MRN# [id number]   YOB: 1955 Age: 66 year old      Primary Cardiologist:   Dr. Caballero           History of Presenting Illness:      Jacques Sharp is a pleasant 66 year old patient with a past cardiac history significant for   1. CAD  2. SVT s/p ablation for AVNRT 2013  3. Hypertension  4. Hyperlipidemia     In December 2013 he underwent ablation for AV node reentry tachycardia.  He had a postop inferior STEMI with subsequent drug-eluting stent to the RCA and then staged intervention of the OM 2.  He was also noted to have atrial tachycardia originating near the his sinus node and no ablation was pursued.  He has been on beta blocker therapy since then.        Echocardiogram April 2014 showed ejection fraction 50-55% with mild inferior and inferolateral hypokinesis, normal right ventricle, no significant valve disease. Nuclear stress November 2015 small fixed inferior wall defect consistent with infarct, no ischemia, ejection fraction 51% with mild inferior hypokinesis.        September 2019 he presented to [facility] with inferior STEMI, troponin 47.  Angiogram showed 100% mid RCA lesion, 80% RPDA, 60% third RPL, 45% proximal to mid LAD, 25% proximal circumflex.  This was a late stent thrombosis of the distal RCA, repeat drug-eluting stents placed.  Echo showed EF 55%, inferior and inferolateral akinesis, normal RV, no valve disease.     September 2020 he presented to the ED with 3 days of dyspnea and palpitations. He had done some heavier work outdoors on a deck.  He denied any chest pain.  EKG showed a regular tachycardia with heart rate of 140.  He was given IV metoprolol.  He was discharged home.    Pt saw Dr. Caballero  in October 2020.  Heart rates have returned to 60s with controlled blood pressures and denied any recurrent palpitations.  If any recurrent SVT will refer to EP.  He recommended continuing Plavix indefinitely due to late stent thrombosis.    Patient called on 9/8/2021 noting that he had irregular heartbeat.  He noted that a week or 2 prior he had forgotten his metoprolol a couple times.  He had heart rates in the 50s to 60s but at times would feel irregular like it was skipping.  He noted this was worse at night and was occurring intermittently.    Pt presents today for annual follow-up. Lipid profile 9/9/2021 with well-controlled lipids total cholesterol 133 HDL 44 LDL 69 triglycerides 102.  Most recent BMP 9/21/2020 with normal renal function and electrolytes. EKG today, personally reviewed by me, shows sinus bradycardia, RBBB, frequent PVCs, and ventricular rate 59 bpm. Results reviewed today.    Even though he has been back to taking his metoprolol consistently, he continues to feel palpitations with irregular pulse.  He is agreeable to switching to extended release metoprolol and increasing this for frequent PVCs.  We will then check Holter monitor to assess his PVC burden.  He does not drink caffeine or alcohol and will work on decreasing stress.  Blood pressure today is well controlled.  He reports no recurrent tachycardia since last year. Patient reports no chest pain, shortness of breath, PND, orthopnea, presyncope, syncope, edema, heart racing.      Labs:  LIPID RESULTS:  Lab Results   Component Value Date    CHOL 133 09/09/2021    CHOL 139 07/31/2020    HDL 44 09/09/2021    HDL 47 07/31/2020    LDL 69 09/09/2021    LDL 78 07/31/2020    TRIG 102 09/09/2021    TRIG 71 07/31/2020    CHOLHDLRATIO 4.1 12/01/2014       LIVER ENZYME RESULTS:  Lab Results   Component Value Date    AST 26 09/21/2020    ALT 38 09/21/2020       CBC RESULTS:  Lab Results   Component Value Date    WBC 5.3 09/21/2020    RBC 5.56  09/21/2020    HGB 14.7 09/21/2020    HCT 45.0 09/21/2020    MCV 81 09/21/2020    MCH 26.4 (L) 09/21/2020    MCHC 32.7 09/21/2020    RDW 13.1 09/21/2020     09/21/2020       BMP RESULTS:  Lab Results   Component Value Date     09/21/2020    POTASSIUM 4.2 09/21/2020    CHLORIDE 108 09/21/2020    CO2 28 09/21/2020    ANIONGAP 4 09/21/2020    GLC 97 09/21/2020    BUN 19 09/21/2020    CR 0.74 09/21/2020    GFRESTIMATED >90 09/21/2020    GFRESTBLACK >90 09/21/2020    YRIS 8.7 09/21/2020        A1C RESULTS:  Lab Results   Component Value Date    A1C 5.9 (H) 09/18/2019       INR RESULTS:  Lab Results   Component Value Date    INR 1.11 01/30/2014    INR 0.96 08/14/2013       Results reviewed today.       Current Cardiac Medications     Aspirin 81 mg daily  Atorvastatin 80 mg daily  Plavix 75 mg daily  Lopressor 25 mg twice daily  Nitroglycerin as needed  Sildenafil                   Assessment and Plan:       Plan  Patient Instructions   Medication Changes:  Change to metoprolol XL and increase to 50 mg daily for PVCs     Recommendations:  1. Check blood pressure at least 1 hour after medications. Call the clinic if your blood pressure is consistently greater than 130/80.   2. Call if blood pressure is less than 90 on top or less than 100 with lightheadedness.    3. Decrease caffeine, alcohol, stress to help with any early beats of the heart    Follow-up:  24 hr holter monitor in 1 week -to assess PVC burden  See Kelsy KAY for cardiology follow up at [facility]: 1 month to review Holter    Cardiology Scheduling~[phone]  Cardiology Clinic RN~[phone] (Coral Wallace, KLARISSA)          1. CAD    Post op STEMI 2013 TAMIKA to dRCA and staged intervention to OM2    Inferior STEMI 2019 d/t late stent thrombosis TAMIKA to mid-distal RCA     No angina    Continue statin, aspirin, beta-blocker    Continue Plavix indefinitely given late stent thrombosis but okay to hold for procedures 5 days prior.      2. PSVT    S/p  ablation for AVNRT 2013    Recurrent episode 2020     Continue beta-blocker    If recurrent SVT then EP consult       3. Hypertension    Controlled    Continue metoprolol      4. Hyperlipidemia    Last LDL 69 on 9/2021    Continue atorvastatin 80 mg daily      5.  Palpitations/PVCs     Irregular heart beat feeling after missing metoprolol     HRs 50-60     EKG with freq PVCs     holter to assess burden     Increase metoprolol and lifestyle modification         Thank you for allowing me to participate in this delightful patient's care.      This note was completed in part using Dragon voice recognition software. Although reviewed after completion, some word and grammatical errors may occur.    JOSE JUAN Thorpe CNP, JOSE JUAN, CNP           Data:   All laboratory data reviewed         Constitutional:  cooperative, alert and oriented, well developed, well nourished, in no acute distress  overweight    Skin:  warm and dry to the touch         Head:  normocephalic       Eyes:  pupils equal and round       ENT:  no pallor or cyanosis       Neck:  no stiffness       Respiratory:  clear to auscultation; normal symmetry        Cardiac: regular rate and rhythm; normal S1 and S2                 pulses full and equal     GI:  abdomen soft, nondistended     Extremities and Muscular Skeletal:  no edema        Neurological:  affect appropriate; no gross motor deficits       Psych:  Alert and Oriented x 3 , appropriate affact        Thank you for allowing me to participate in the care of your patient.      Sincerely,     JOSE JUAN Thorpe CNP     [facility]  cc:   Alvaro Caballero MD  [facility]  [address] [place] [address]

## 2021-09-10 NOTE — PROGRESS NOTES
Cardiology Clinic Progress Note  Jacques Sharp MRN# [id number]   YOB: 1955 Age: 66 year old      Primary Cardiologist:   Dr. Caballero           History of Presenting Illness:      Jacques Sharp is a pleasant 66 year old patient with a past cardiac history significant for   1. CAD  2. SVT s/p ablation for AVNRT 2013  3. Hypertension  4. Hyperlipidemia     In December 2013 he underwent ablation for AV node reentry tachycardia.  He had a postop inferior STEMI with subsequent drug-eluting stent to the RCA and then staged intervention of the OM 2.  He was also noted to have atrial tachycardia originating near the his sinus node and no ablation was pursued.  He has been on beta blocker therapy since then.        Echocardiogram April 2014 showed ejection fraction 50-55% with mild inferior and inferolateral hypokinesis, normal right ventricle, no significant valve disease. Nuclear stress November 2015 small fixed inferior wall defect consistent with infarct, no ischemia, ejection fraction 51% with mild inferior hypokinesis.        September 2019 he presented to [facility] with inferior STEMI, troponin 47.  Angiogram showed 100% mid RCA lesion, 80% RPDA, 60% third RPL, 45% proximal to mid LAD, 25% proximal circumflex.  This was a late stent thrombosis of the distal RCA, repeat drug-eluting stents placed.  Echo showed EF 55%, inferior and inferolateral akinesis, normal RV, no valve disease.     September 2020 he presented to the ED with 3 days of dyspnea and palpitations. He had done some heavier work outdoors on a deck.  He denied any chest pain.  EKG showed a regular tachycardia with heart rate of 140.  He was given IV metoprolol.  He was discharged home.    Pt saw Dr. Caballero in October 2020.  Heart rates have returned to 60s with controlled blood pressures and denied any recurrent palpitations.  If any recurrent SVT will refer to EP.  He recommended continuing Plavix indefinitely due to late stent  thrombosis.    Patient called on 9/8/2021 noting that he had irregular heartbeat.  He noted that a week or 2 prior he had forgotten his metoprolol a couple times.  He had heart rates in the 50s to 60s but at times would feel irregular like it was skipping.  He noted this was worse at night and was occurring intermittently.    Pt presents today for annual follow-up. Lipid profile 9/9/2021 with well-controlled lipids total cholesterol 133 HDL 44 LDL 69 triglycerides 102.  Most recent BMP 9/21/2020 with normal renal function and electrolytes. EKG today, personally reviewed by me, shows sinus bradycardia, RBBB, frequent PVCs, and ventricular rate 59 bpm. Results reviewed today.    Even though he has been back to taking his metoprolol consistently, he continues to feel palpitations with irregular pulse.  He is agreeable to switching to extended release metoprolol and increasing this for frequent PVCs.  We will then check Holter monitor to assess his PVC burden.  He does not drink caffeine or alcohol and will work on decreasing stress.  Blood pressure today is well controlled.  He reports no recurrent tachycardia since last year. Patient reports no chest pain, shortness of breath, PND, orthopnea, presyncope, syncope, edema, heart racing.      Labs:  LIPID RESULTS:  Lab Results   Component Value Date    CHOL 133 09/09/2021    CHOL 139 07/31/2020    HDL 44 09/09/2021    HDL 47 07/31/2020    LDL 69 09/09/2021    LDL 78 07/31/2020    TRIG 102 09/09/2021    TRIG 71 07/31/2020    CHOLHDLRATIO 4.1 12/01/2014       LIVER ENZYME RESULTS:  Lab Results   Component Value Date    AST 26 09/21/2020    ALT 38 09/21/2020       CBC RESULTS:  Lab Results   Component Value Date    WBC 5.3 09/21/2020    RBC 5.56 09/21/2020    HGB 14.7 09/21/2020    HCT 45.0 09/21/2020    MCV 81 09/21/2020    MCH 26.4 (L) 09/21/2020    MCHC 32.7 09/21/2020    RDW 13.1 09/21/2020     09/21/2020       BMP RESULTS:  Lab Results   Component Value Date      09/21/2020    POTASSIUM 4.2 09/21/2020    CHLORIDE 108 09/21/2020    CO2 28 09/21/2020    ANIONGAP 4 09/21/2020    GLC 97 09/21/2020    BUN 19 09/21/2020    CR 0.74 09/21/2020    GFRESTIMATED >90 09/21/2020    GFRESTBLACK >90 09/21/2020    YRIS 8.7 09/21/2020        A1C RESULTS:  Lab Results   Component Value Date    A1C 5.9 (H) 09/18/2019       INR RESULTS:  Lab Results   Component Value Date    INR 1.11 01/30/2014    INR 0.96 08/14/2013       Results reviewed today.       Current Cardiac Medications     Aspirin 81 mg daily  Atorvastatin 80 mg daily  Plavix 75 mg daily  Lopressor 25 mg twice daily  Nitroglycerin as needed  Sildenafil                   Assessment and Plan:       Plan  Patient Instructions   Medication Changes:  Change to metoprolol XL and increase to 50 mg daily for PVCs     Recommendations:  1. Check blood pressure at least 1 hour after medications. Call the clinic if your blood pressure is consistently greater than 130/80.   2. Call if blood pressure is less than 90 on top or less than 100 with lightheadedness.    3. Decrease caffeine, alcohol, stress to help with any early beats of the heart    Follow-up:  24 hr holter monitor in 1 week -to assess PVC burden  See Kelsy KAY for cardiology follow up at [facility]: 1 month to review Holter    Cardiology Scheduling~[phone]  Cardiology Clinic RN~[phone] (Coral Wallace RN)          1. CAD    Post op STEMI 2013 TAMIKA to dRCA and staged intervention to OM2    Inferior STEMI 2019 d/t late stent thrombosis TAMIKA to mid-distal RCA     No angina    Continue statin, aspirin, beta-blocker    Continue Plavix indefinitely given late stent thrombosis but okay to hold for procedures 5 days prior.      2. PSVT    S/p ablation for AVNRT 2013    Recurrent episode 2020     Continue beta-blocker    If recurrent SVT then EP consult       3. Hypertension    Controlled    Continue metoprolol      4. Hyperlipidemia    Last LDL 69 on 9/2021    Continue  atorvastatin 80 mg daily      5.  Palpitations/PVCs     Irregular heart beat feeling after missing metoprolol     HRs 50-60     EKG with freq PVCs     holter to assess burden     Increase metoprolol and lifestyle modification         Thank you for allowing me to participate in this delightful patient's care.      This note was completed in part using Dragon voice recognition software. Although reviewed after completion, some word and grammatical errors may occur.    Kelsy Rivera, APRN CNP, APRN, CNP           Data:   All laboratory data reviewed         Constitutional:  cooperative, alert and oriented, well developed, well nourished, in no acute distress  overweight    Skin:  warm and dry to the touch         Head:  normocephalic       Eyes:  pupils equal and round       ENT:  no pallor or cyanosis       Neck:  no stiffness       Respiratory:  clear to auscultation; normal symmetry        Cardiac: regular rate and rhythm; normal S1 and S2                 pulses full and equal     GI:  abdomen soft, nondistended     Extremities and Muscular Skeletal:  no edema        Neurological:  affect appropriate; no gross motor deficits       Psych:  Alert and Oriented x 3 , appropriate affact

## 2021-09-16 ENCOUNTER — HOSPITAL ENCOUNTER (OUTPATIENT)
Dept: CARDIOLOGY | Facility: CLINIC | Age: 66
Discharge: HOME | End: 2021-09-16
Attending: NURSE PRACTITIONER | Admitting: NURSE PRACTITIONER
Payer: COMMERCIAL

## 2021-09-16 DIAGNOSIS — I49.3 PVC'S (PREMATURE VENTRICULAR CONTRACTIONS): ICD-10-CM

## 2021-09-16 PROCEDURE — 93225 XTRNL ECG REC<48 HRS REC: CPT

## 2021-09-16 PROCEDURE — 93227 XTRNL ECG REC<48 HR R&I: CPT | Performed by: INTERNAL MEDICINE

## 2021-09-22 NOTE — RESULT ENCOUNTER NOTE
Pt with palpitaitons and freq PVCs on EKG at [facility]. Metoprolol XL increased to 50 mg daily for PVCs.     24-hour Holter monitor 9/16/2021 showing sinus rhythm with RBBB, average heart rate 64 bpm, no significant pauses, 3% PVC burden, and no events reported.    To be discussed at follow-up 10/8/21.

## 2021-10-08 ENCOUNTER — OFFICE VISIT (OUTPATIENT)
Dept: CARDIOLOGY | Facility: CLINIC | Age: 66
End: 2021-10-08
Attending: NURSE PRACTITIONER
Payer: COMMERCIAL

## 2021-10-08 VITALS
DIASTOLIC BLOOD PRESSURE: 76 MMHG | HEART RATE: 63 BPM | SYSTOLIC BLOOD PRESSURE: 123 MMHG | BODY MASS INDEX: 27.12 KG/M2 | WEIGHT: 158 LBS

## 2021-10-08 DIAGNOSIS — I49.3 PVC'S (PREMATURE VENTRICULAR CONTRACTIONS): Primary | ICD-10-CM

## 2021-10-08 DIAGNOSIS — I25.10 CORONARY ARTERY DISEASE INVOLVING NATIVE CORONARY ARTERY OF NATIVE HEART WITHOUT ANGINA PECTORIS: ICD-10-CM

## 2021-10-08 DIAGNOSIS — R00.2 PALPITATIONS: ICD-10-CM

## 2021-10-08 DIAGNOSIS — I47.10 PAROXYSMAL SUPRAVENTRICULAR TACHYCARDIA (H): ICD-10-CM

## 2021-10-08 DIAGNOSIS — I10 BENIGN ESSENTIAL HYPERTENSION: ICD-10-CM

## 2021-10-08 DIAGNOSIS — E78.5 HYPERLIPIDEMIA LDL GOAL <70: ICD-10-CM

## 2021-10-08 PROCEDURE — 99214 OFFICE O/P EST MOD 30 MIN: CPT | Performed by: NURSE PRACTITIONER

## 2021-10-08 RX ORDER — METOPROLOL SUCCINATE 50 MG/1
50 TABLET, EXTENDED RELEASE ORAL DAILY
Qty: 90 TABLET | Refills: 3 | Status: SHIPPED | OUTPATIENT
Start: 2021-10-08 | End: 2022-09-15

## 2021-10-08 NOTE — PATIENT INSTRUCTIONS
Medication Changes:  None     Recommendations:  1. Can take metoprolol at bedtime to help with fatigue   2. Call if having more palpitations or fatigue is not tolerable     Follow-up:  See Dr. Caballero for cardiology follow up at [facility]: Oct 2022. Call in June  to schedule.     Cardiology Scheduling~[phone]  Cardiology Clinic RN~[phone] (Coral Wallace RN)

## 2021-10-08 NOTE — PROGRESS NOTES
Cardiology Clinic Progress Note  Jacques Sharp MRN# [id number]   YOB: 1955 Age: 66 year old      Primary Cardiologist:   Dr. Caballero           History of Presenting Illness:      Jacques Sharp is a pleasant 66 year old patient with a past cardiac history significant for   1. CAD  2. SVT s/p ablation for AVNRT 2013  3. PVCs   4. Hypertension  5. Hyperlipidemia     In December 2013 he underwent ablation for AV node reentry tachycardia.  He had a postop inferior STEMI with subsequent drug-eluting stent to the RCA and then staged intervention of the OM 2.  He was also noted to have atrial tachycardia originating near the his sinus node and no ablation was pursued.  He has been on beta blocker therapy since then.        Echocardiogram April 2014 showed ejection fraction 50-55% with mild inferior and inferolateral hypokinesis, normal right ventricle, no significant valve disease. Nuclear stress November 2015 small fixed inferior wall defect consistent with infarct, no ischemia, ejection fraction 51% with mild inferior hypokinesis.        September 2019 he presented to [facility] with inferior STEMI, troponin 47.  Angiogram showed 100% mid RCA lesion, 80% RPDA, 60% third RPL, 45% proximal to mid LAD, 25% proximal circumflex.  This was a late stent thrombosis of the distal RCA, repeat drug-eluting stents placed.  Echo showed EF 55%, inferior and inferolateral akinesis, normal RV, no valve disease.     September 2020 he presented to the ED with 3 days of dyspnea and palpitations. He had done some heavier work outdoors on a deck.  He denied any chest pain.  EKG showed a regular tachycardia with heart rate of 140.  He was given IV metoprolol.  He was discharged home.    Pt saw Dr. Caballero in October 2020.  Heart rates have returned to 60s with controlled blood pressures and denied any recurrent palpitations.  If any recurrent SVT will refer to EP.  He recommended continuing Plavix indefinitely due to late stent  thrombosis.    Patient called on 9/8/2021 noting that he had irregular heartbeat.  He noted that a week or 2 prior he had forgotten his metoprolol a couple times.  He had heart rates in the 50s to 60s but at times would feel irregular like it was skipping.  He noted this was worse at night and was occurring intermittently.    He was seen by me on 9/10/2021 for his annual follow-up.  EKG showed frequent PVCs.  He continued with palpitations and Lopressor was changed metoprolol XL and increased to 50 mg daily.  Holter monitor recommended to assess PVC burden.  He did not drink caffeine or alcohol and discussed working on decreasing stress.    Pt presents today for 1 month follow-up.  Holter 9/16/21: SR with RBBB, avg HR 64 bpm, no significant pauses, 3% PVC burden, and no events reported.  Results reviewed today.    After increasing metoprolol, he has had a decrease in his palpitations. He has noted slightly increased fatigue in the afternoon and he will try taking this at bedtime. If it is not tolerable he will let us know.  Blood pressure is controlled. Patient reports no chest pain, shortness of breath, PND, orthopnea, presyncope, syncope, edema, heart racing.      Labs:  LIPID RESULTS:  Lab Results   Component Value Date    CHOL 133 09/09/2021    CHOL 139 07/31/2020    HDL 44 09/09/2021    HDL 47 07/31/2020    LDL 69 09/09/2021    LDL 78 07/31/2020    TRIG 102 09/09/2021    TRIG 71 07/31/2020    CHOLHDLRATIO 4.1 12/01/2014       LIVER ENZYME RESULTS:  Lab Results   Component Value Date    AST 26 09/21/2020    ALT 38 09/21/2020       CBC RESULTS:  Lab Results   Component Value Date    WBC 5.3 09/21/2020    RBC 5.56 09/21/2020    HGB 14.7 09/21/2020    HCT 45.0 09/21/2020    MCV 81 09/21/2020    MCH 26.4 (L) 09/21/2020    MCHC 32.7 09/21/2020    RDW 13.1 09/21/2020     09/21/2020       BMP RESULTS:  Lab Results   Component Value Date     09/21/2020    POTASSIUM 4.2 09/21/2020    CHLORIDE 108 09/21/2020     CO2 28 09/21/2020    ANIONGAP 4 09/21/2020    GLC 97 09/21/2020    BUN 19 09/21/2020    CR 0.74 09/21/2020    GFRESTIMATED >90 09/21/2020    GFRESTBLACK >90 09/21/2020    YRIS 8.7 09/21/2020        A1C RESULTS:  Lab Results   Component Value Date    A1C 5.9 (H) 09/18/2019       INR RESULTS:  Lab Results   Component Value Date    INR 1.11 01/30/2014    INR 0.96 08/14/2013       Results reviewed today.       Current Cardiac Medications     Aspirin 81 mg daily  Atorvastatin 80 mg daily  Plavix 75 mg daily  Metoprolol XL 50 mg daily  Nitroglycerin as needed  Sildenafil                   Assessment and Plan:       Plan  Patient Instructions   Medication Changes:  None     Recommendations:  1. Can take metoprolol at bedtime to help with fatigue   2. Call if having more palpitations or fatigue is not tolerable     Follow-up:  See Dr. Caballero for cardiology follow up at [place]: Oct 2022. Call in June  to schedule.     Cardiology Scheduling~[phone]  Cardiology Clinic RN~[phone] (Coral Wallace, KLARISSA)        1. CAD    Post op STEMI 2013 TAMIKA to dRCA and staged intervention to OM2    Inferior STEMI 2019 d/t late stent thrombosis TAMIKA to mid-distal RCA     No angina    Continue statin, aspirin, beta-blocker    Continue Plavix indefinitely given late stent thrombosis but okay to hold for procedures 5 days prior.      2. PSVT    S/p ablation for AVNRT 2013    Recurrent episode 2020     Continue beta-blocker    If recurrent SVT then EP consult       3. Hypertension    Controlled    Continue metoprolol      4. Hyperlipidemia    Last LDL 69 on 9/2021    Continue atorvastatin 80 mg daily      5.  Palpitations/PVCs     Irregular heart beat feeling after missing metoprolol     HRs 50-60     EKG with freq PVCs     holter 9/2021 3% burden after increasing metoprolol     No etoh or caffeine use          Thank you for allowing me to participate in this delightful patient's care.      This note was completed in part using  [employer] voice recognition software. Although reviewed after completion, some word and grammatical errors may occur.    Kelsy Rivera, JOSE JUAN CNP, APRN, CNP           Data:   All laboratory data reviewed    Total time spent today was 20 mins, reviewing labs, testing, notes, documenting notes, and seeing patient.       Constitutional:  cooperative, alert and oriented, well developed, well nourished, in no acute distress  overweight    Skin:  warm and dry to the touch         Head:  normocephalic       Eyes:  pupils equal and round       ENT:  no pallor or cyanosis       Neck:  no stiffness       Respiratory:  clear to auscultation; normal symmetry        Cardiac: regular rate and rhythm; normal S1 and S2                 pulses full and equal     GI:  abdomen soft, nondistended     Extremities and Muscular Skeletal:  no edema        Neurological:  affect appropriate; no gross motor deficits       Psych:  Alert and Oriented x 3 , appropriate affact

## 2021-10-08 NOTE — LETTER
10/8/2021    Celia Lu PA-C  [address] [facility] [address]    RE: Jacques Sharp       Dear Colleague,    I had the pleasure of seeing Jacques Sharp in the [facility].    Cardiology Clinic Progress Note  Jacques Sharp MRN# [id number]   YOB: 1955 Age: 66 year old      Primary Cardiologist:   Dr. Caballero           History of Presenting Illness:      Jacques Sharp is a pleasant 66 year old patient with a past cardiac history significant for   1. CAD  2. SVT s/p ablation for AVNRT 2013  3. PVCs   4. Hypertension  5. Hyperlipidemia     In December 2013 he underwent ablation for AV node reentry tachycardia.  He had a postop inferior STEMI with subsequent drug-eluting stent to the RCA and then staged intervention of the OM 2.  He was also noted to have atrial tachycardia originating near the his sinus node and no ablation was pursued.  He has been on beta blocker therapy since then.        Echocardiogram April 2014 showed ejection fraction 50-55% with mild inferior and inferolateral hypokinesis, normal right ventricle, no significant valve disease. Nuclear stress November 2015 small fixed inferior wall defect consistent with infarct, no ischemia, ejection fraction 51% with mild inferior hypokinesis.        September 2019 he presented to [facility] with inferior STEMI, troponin 47.  Angiogram showed 100% mid RCA lesion, 80% RPDA, 60% third RPL, 45% proximal to mid LAD, 25% proximal circumflex.  This was a late stent thrombosis of the distal RCA, repeat drug-eluting stents placed.  Echo showed EF 55%, inferior and inferolateral akinesis, normal RV, no valve disease.     September 2020 he presented to the ED with 3 days of dyspnea and palpitations. He had done some heavier work outdoors on a deck.  He denied any chest pain.  EKG showed a regular tachycardia with heart rate of 140.  He was given IV metoprolol.  He was discharged home.    Pt saw  Dr. Caballero in October 2020.  Heart rates have returned to 60s with controlled blood pressures and denied any recurrent palpitations.  If any recurrent SVT will refer to EP.  He recommended continuing Plavix indefinitely due to late stent thrombosis.    Patient called on 9/8/2021 noting that he had irregular heartbeat.  He noted that a week or 2 prior he had forgotten his metoprolol a couple times.  He had heart rates in the 50s to 60s but at times would feel irregular like it was skipping.  He noted this was worse at night and was occurring intermittently.    He was seen by me on 9/10/2021 for his annual follow-up.  EKG showed frequent PVCs.  He continued with palpitations and Lopressor was changed metoprolol XL and increased to 50 mg daily.  Holter monitor recommended to assess PVC burden.  He did not drink caffeine or alcohol and discussed working on decreasing stress.    Pt presents today for 1 month follow-up.  Holter 9/16/21: SR with RBBB, avg HR 64 bpm, no significant pauses, 3% PVC burden, and no events reported.  Results reviewed today.    After increasing metoprolol, he has had a decrease in his palpitations. He has noted slightly increased fatigue in the afternoon and he will try taking this at bedtime. If it is not tolerable he will let us know.  Blood pressure is controlled. Patient reports no chest pain, shortness of breath, PND, orthopnea, presyncope, syncope, edema, heart racing.      Labs:  LIPID RESULTS:  Lab Results   Component Value Date    CHOL 133 09/09/2021    CHOL 139 07/31/2020    HDL 44 09/09/2021    HDL 47 07/31/2020    LDL 69 09/09/2021    LDL 78 07/31/2020    TRIG 102 09/09/2021    TRIG 71 07/31/2020    CHOLHDLRATIO 4.1 12/01/2014       LIVER ENZYME RESULTS:  Lab Results   Component Value Date    AST 26 09/21/2020    ALT 38 09/21/2020       CBC RESULTS:  Lab Results   Component Value Date    WBC 5.3 09/21/2020    RBC 5.56 09/21/2020    HGB 14.7 09/21/2020    HCT 45.0 09/21/2020    MCV  81 09/21/2020    MCH 26.4 (L) 09/21/2020    MCHC 32.7 09/21/2020    RDW 13.1 09/21/2020     09/21/2020       BMP RESULTS:  Lab Results   Component Value Date     09/21/2020    POTASSIUM 4.2 09/21/2020    CHLORIDE 108 09/21/2020    CO2 28 09/21/2020    ANIONGAP 4 09/21/2020    GLC 97 09/21/2020    BUN 19 09/21/2020    CR 0.74 09/21/2020    GFRESTIMATED >90 09/21/2020    GFRESTBLACK >90 09/21/2020    YRIS 8.7 09/21/2020        A1C RESULTS:  Lab Results   Component Value Date    A1C 5.9 (H) 09/18/2019       INR RESULTS:  Lab Results   Component Value Date    INR 1.11 01/30/2014    INR 0.96 08/14/2013       Results reviewed today.       Current Cardiac Medications     Aspirin 81 mg daily  Atorvastatin 80 mg daily  Plavix 75 mg daily  Metoprolol XL 50 mg daily  Nitroglycerin as needed  Sildenafil                   Assessment and Plan:       Plan  Patient Instructions   Medication Changes:  None     Recommendations:  1. Can take metoprolol at bedtime to help with fatigue   2. Call if having more palpitations or fatigue is not tolerable     Follow-up:  See Dr. Caballero for cardiology follow up at [place]: Oct 2022. Call in Lani  to schedule.     Cardiology Scheduling~[phone]  Cardiology Clinic RN~[phone] (Coral Wallace RN)        1. CAD    Post op STEMI 2013 TAMIKA to dRCA and staged intervention to OM2    Inferior STEMI 2019 d/t late stent thrombosis TAMIKA to mid-distal RCA     No angina    Continue statin, aspirin, beta-blocker    Continue Plavix indefinitely given late stent thrombosis but okay to hold for procedures 5 days prior.      2. PSVT    S/p ablation for AVNRT 2013    Recurrent episode 2020     Continue beta-blocker    If recurrent SVT then EP consult       3. Hypertension    Controlled    Continue metoprolol      4. Hyperlipidemia    Last LDL 69 on 9/2021    Continue atorvastatin 80 mg daily      5.  Palpitations/PVCs     Irregular heart beat feeling after missing metoprolol     HRs  50-60     EKG with freq PVCs     holter 9/2021 3% burden after increasing metoprolol     No etoh or caffeine use          Thank you for allowing me to participate in this delightful patient's care.      This note was completed in part using Dragon voice recognition software. Although reviewed after completion, some word and grammatical errors may occur.    JOSE JUAN Thorpe CNP, APRN, CNP           Data:   All laboratory data reviewed    Total time spent today was 20 mins, reviewing labs, testing, notes, documenting notes, and seeing patient.       Constitutional:  cooperative, alert and oriented, well developed, well nourished, in no acute distress  overweight    Skin:  warm and dry to the touch         Head:  normocephalic       Eyes:  pupils equal and round       ENT:  no pallor or cyanosis       Neck:  no stiffness       Respiratory:  clear to auscultation; normal symmetry        Cardiac: regular rate and rhythm; normal S1 and S2                 pulses full and equal     GI:  abdomen soft, nondistended     Extremities and Muscular Skeletal:  no edema        Neurological:  affect appropriate; no gross motor deficits       Psych:  Alert and Oriented x 3 , appropriate affact        Thank you for allowing me to participate in the care of your patient.      Sincerely,     JOSE JUAN Thorpe CNP     [facility]  cc:   JOSE JUAN Platt CNP  [address]  [place] [address]

## 2021-12-13 DIAGNOSIS — E78.5 HYPERLIPIDEMIA LDL GOAL <70: ICD-10-CM

## 2021-12-13 DIAGNOSIS — I47.10 PAROXYSMAL SUPRAVENTRICULAR TACHYCARDIA (H): ICD-10-CM

## 2021-12-16 RX ORDER — CLOPIDOGREL BISULFATE 75 MG/1
TABLET ORAL
Qty: 90 TABLET | Refills: 1 | Status: SHIPPED | OUTPATIENT
Start: 2021-12-16 | End: 2022-06-23

## 2021-12-16 RX ORDER — ATORVASTATIN CALCIUM 80 MG/1
TABLET, FILM COATED ORAL
Qty: 90 TABLET | Refills: 2 | Status: SHIPPED | OUTPATIENT
Start: 2021-12-16 | End: 2022-09-15

## 2022-02-22 ENCOUNTER — OFFICE VISIT (OUTPATIENT)
Dept: DERMATOLOGY | Facility: CLINIC | Age: 67
End: 2022-02-22
Payer: COMMERCIAL

## 2022-02-22 VITALS — DIASTOLIC BLOOD PRESSURE: 77 MMHG | SYSTOLIC BLOOD PRESSURE: 140 MMHG | OXYGEN SATURATION: 98 % | HEART RATE: 66 BPM

## 2022-02-22 DIAGNOSIS — D18.01 ANGIOMA OF SKIN: ICD-10-CM

## 2022-02-22 DIAGNOSIS — L81.4 LENTIGO: ICD-10-CM

## 2022-02-22 DIAGNOSIS — C44.111 BASAL CELL CARCINOMA (BCC) OF RIGHT ORBIT: ICD-10-CM

## 2022-02-22 DIAGNOSIS — L82.1 SEBORRHEIC KERATOSIS: Primary | ICD-10-CM

## 2022-02-22 DIAGNOSIS — D23.9 DERMAL NEVUS: ICD-10-CM

## 2022-02-22 PROCEDURE — 88331 PATH CONSLTJ SURG 1 BLK 1SPC: CPT | Performed by: DERMATOLOGY

## 2022-02-22 PROCEDURE — 99203 OFFICE O/P NEW LOW 30 MIN: CPT | Mod: 25 | Performed by: DERMATOLOGY

## 2022-02-22 PROCEDURE — 11102 TANGNTL BX SKIN SINGLE LES: CPT | Performed by: DERMATOLOGY

## 2022-02-22 NOTE — LETTER
2/22/2022         RE: Jacques Sharp  [address] [facility] [address] [place] [address]        Dear Colleague,    Thank you for referring your patient, Jacques Sharp, to the [facility]. Please see a copy of my visit note below.    Jacques Sharp is an extremely pleasant 66 year old year old male patient here today for spot on face.   .   Patient states this has been present for a while.  Patient reports the following symptoms:  Not healing.  Patient reports the following previous treatments none.  These treatments did not work.  Patient reports the following modifying factors none.  Associated symptoms: none.  Patient has no other skin complaints today.  Remainder of the HPI, Meds, PMH, Allergies, FH, and SH was reviewed in chart.      Past Medical History:   Diagnosis Date     Acute MI (H) 12/24/2013     Coronary artery disease      Hyperlipidaemia      Hyperlipidaemia      Paroxysmal supraventricular tachycardia (H)        Past Surgical History:   Procedure Laterality Date     CORONARY ANGIOGRAPHY ADULT ORDER  1/30/14, 12/24/13     CV CORONARY ANGIOGRAM N/A 9/17/2019    Procedure: Coronary Angiogram;  Surgeon: Hoang Lozada MD;  Location:  HEART CARDIAC CATH LAB     EP ABLATION / EP STUDIES  12/23/13     H ABLATION SVT  12/23/13     LUNG SURGERY      benign cyst removed.      VASECTOMY          Family History   Problem Relation Age of Onset     Hypertension Mother      Cancer Father         lung     Cancer - colorectal No family hx of      Prostate Cancer No family hx of        Social History     Socioeconomic History     Marital status: Single     Spouse name: Not on file     Number of children: Not on file     Years of education: Not on file     Highest education level: Not on file   Occupational History     Not on file   Tobacco Use     Smoking status: Former Smoker     Smokeless tobacco: Never Used     Tobacco comment: quit 30+ years ago   Substance and Sexual Activity     Alcohol use: No      Drug use: No     Sexual activity: Yes     Partners: Female   Other Topics Concern     Parent/sibling w/ CABG, MI or angioplasty before 65F 55M? Not Asked   Social History Narrative     Not on file     Social Determinants of Health     Financial Resource Strain: Not on file   Food Insecurity: Not on file   Transportation Needs: Not on file   Physical Activity: Not on file   Stress: Not on file   Social Connections: Not on file   Intimate Partner Violence: Not on file   Housing Stability: Not on file       Outpatient Encounter Medications as of 2/22/2022   Medication Sig Dispense Refill     acetaminophen (TYLENOL) 325 MG tablet Take 1-2 tablets (325-650 mg) by mouth every 4 hours as needed 100 tablet      aspirin (ASA) 81 MG chewable tablet CHEW AND SWALLOW ONE TABLET BY MOUTH ONCE DAILY 90 tablet 3     atorvastatin (LIPITOR) 80 MG tablet TAKE ONE TABLET BY MOUTH ONCE DAILY 90 tablet 2     clopidogrel (PLAVIX) 75 MG tablet TAKE ONE TABLET BY MOUTH ONCE DAILY 90 tablet 1     metoprolol succinate ER (TOPROL-XL) 50 MG 24 hr tablet Take 1 tablet (50 mg) by mouth daily 90 tablet 3     nitroGLYcerin (NITROSTAT) 0.4 MG sublingual tablet Place 1 tablet (0.4 mg) under the tongue every 5 minutes as needed for chest pain 60 tablet 0     sildenafil (REVATIO) 20 MG tablet Take 1 tablet (20 mg) by mouth daily as needed (erectile dysfunction) DO NOT USE WITHIN 24 HOURS OF NITROGLYCERIN 25 tablet 3     No facility-administered encounter medications on file as of 2/22/2022.             O:   NAD, WDWN, Alert & Oriented, Mood & Affect wnl, Vitals stable   Here today alone   BP (!) 140/77 (BP Location: Left arm, Patient Position: Sitting, Cuff Size: Adult Regular)   Pulse 66   SpO2 98%    General appearance normal   Vitals stable   Alert, oriented and in no acute distress     R orbital facial sulcus ulcerated 1cm papule     Stuck on papules and brown macules on trunk and ext   Red papules on trunk  Flesh colored papules on  trunk         Eyes: Conjunctivae/lids:Normal     ENT: Lips, buccal mucosa, tongue: normal    MSK:Normal    Cardiovascular: peripheral edema none    Pulm: Breathing Normal    Lymph Nodes: No Head and Neck Lymphadenopathy     Neuro/Psych: Orientation:Alert and Orientedx3 ; Mood/Affect:normal       MICRO:   R OFS:Orthokeratosis of epidermis with a proliferation of nests of basaloid cells, with peripheral palisading and a haphazard arrangement in the center extending into the dermis, forming nodules.  The tumor cells have hyperchromatic nuclei. Poor cytoplasm and intercellular bridging.    A/P:  1. Seborrheic keratosis, lentigo, angioma, dermal nevus  2. R OFS r/o basal cell carcinoma   TANGENTIAL BIOPSY IN HOUSE:  After consent, anesthesia with LEC and prep, tangential excision performed and dx above confirmed with frozen section histology.  No complications and routine wound care.  Patient is plavix  anticoagulants and risk of bleeding discussed with patient.       I have personally reviewed all specimens and/or slides and used them with my medical judgement to determine or confirm the final diagnosis.     Patient told result basal cell carcinoma schedule excision .      It was a pleasure speaking to Jacques Sharp today.  Previous clinic notes and pertinent laboratory tests were reviewed prior to Jacques Sharp's visit.  Nature and genetics of benign skin lesions dicussed with patient.  Signs and Symptoms of skin cancer discussed with patient.  Patient encouraged to perform monthly skin exams.  UV precautions reviewed with patient.  Risks of non-melanoma skin cancer discussed with patient   Return to clinic next appt        Again, thank you for allowing me to participate in the care of your patient.        Sincerely,        Matt Freeman MD

## 2022-02-22 NOTE — NURSING NOTE
Chief Complaint   Patient presents with     Derm Problem     spot        Haley Mcghee on 2/22/2022 at 2:16 PM

## 2022-02-22 NOTE — PROGRESS NOTES
Jacques Sharp is an extremely pleasant 66 year old year old male patient here today for spot on face.   .   Patient states this has been present for a while.  Patient reports the following symptoms:  Not healing.  Patient reports the following previous treatments none.  These treatments did not work.  Patient reports the following modifying factors none.  Associated symptoms: none.  Patient has no other skin complaints today.  Remainder of the HPI, Meds, PMH, Allergies, FH, and SH was reviewed in chart.      Past Medical History:   Diagnosis Date     Acute MI (H) 12/24/2013     Coronary artery disease      Hyperlipidaemia      Hyperlipidaemia      Paroxysmal supraventricular tachycardia (H)        Past Surgical History:   Procedure Laterality Date     CORONARY ANGIOGRAPHY ADULT ORDER  1/30/14, 12/24/13     CV CORONARY ANGIOGRAM N/A 9/17/2019    Procedure: Coronary Angiogram;  Surgeon: Hoang Lozada MD;  Location:  [facility]     EP ABLATION / EP STUDIES  12/23/13     H ABLATION SVT  12/23/13     LUNG SURGERY      benign cyst removed.      VASECTOMY          Family History   Problem Relation Age of Onset     Hypertension Mother      Cancer Father         lung     Cancer - colorectal No family hx of      Prostate Cancer No family hx of        Social History     Socioeconomic History     Marital status: Single     Spouse name: Not on file     Number of children: Not on file     Years of education: Not on file     Highest education level: Not on file   Occupational History     Not on file   Tobacco Use     Smoking status: Former Smoker     Smokeless tobacco: Never Used     Tobacco comment: quit 30+ years ago   Substance and Sexual Activity     Alcohol use: No     Drug use: No     Sexual activity: Yes     Partners: Female   Other Topics Concern     Parent/sibling w/ CABG, MI or angioplasty before 65F 55M? Not Asked   Social History Narrative     Not on file     Social Determinants of Health      Financial Resource Strain: Not on file   Food Insecurity: Not on file   Transportation Needs: Not on file   Physical Activity: Not on file   Stress: Not on file   Social Connections: Not on file   Intimate Partner Violence: Not on file   Housing Stability: Not on file       Outpatient Encounter Medications as of 2/22/2022   Medication Sig Dispense Refill     acetaminophen (TYLENOL) 325 MG tablet Take 1-2 tablets (325-650 mg) by mouth every 4 hours as needed 100 tablet      aspirin (ASA) 81 MG chewable tablet CHEW AND SWALLOW ONE TABLET BY MOUTH ONCE DAILY 90 tablet 3     atorvastatin (LIPITOR) 80 MG tablet TAKE ONE TABLET BY MOUTH ONCE DAILY 90 tablet 2     clopidogrel (PLAVIX) 75 MG tablet TAKE ONE TABLET BY MOUTH ONCE DAILY 90 tablet 1     metoprolol succinate ER (TOPROL-XL) 50 MG 24 hr tablet Take 1 tablet (50 mg) by mouth daily 90 tablet 3     nitroGLYcerin (NITROSTAT) 0.4 MG sublingual tablet Place 1 tablet (0.4 mg) under the tongue every 5 minutes as needed for chest pain 60 tablet 0     sildenafil (REVATIO) 20 MG tablet Take 1 tablet (20 mg) by mouth daily as needed (erectile dysfunction) DO NOT USE WITHIN 24 HOURS OF NITROGLYCERIN 25 tablet 3     No facility-administered encounter medications on file as of 2/22/2022.             O:   NAD, WDWN, Alert & Oriented, Mood & Affect wnl, Vitals stable   Here today alone   BP (!) 140/77 (BP Location: Left arm, Patient Position: Sitting, Cuff Size: Adult Regular)   Pulse 66   SpO2 98%    General appearance normal   Vitals stable   Alert, oriented and in no acute distress     R orbital facial sulcus ulcerated 1cm papule     Stuck on papules and brown macules on trunk and ext   Red papules on trunk  Flesh colored papules on trunk         Eyes: Conjunctivae/lids:Normal     ENT: Lips, buccal mucosa, tongue: normal    MSK:Normal    Cardiovascular: peripheral edema none    Pulm: Breathing Normal    Lymph Nodes: No Head and Neck Lymphadenopathy     Neuro/Psych:  Orientation:Alert and Orientedx3 ; Mood/Affect:normal       MICRO:   R OFS:Orthokeratosis of epidermis with a proliferation of nests of basaloid cells, with peripheral palisading and a haphazard arrangement in the center extending into the dermis, forming nodules.  The tumor cells have hyperchromatic nuclei. Poor cytoplasm and intercellular bridging.    A/P:  1. Seborrheic keratosis, lentigo, angioma, dermal nevus  2. R OFS r/o basal cell carcinoma   TANGENTIAL BIOPSY IN HOUSE:  After consent, anesthesia with LEC and prep, tangential excision performed and dx above confirmed with frozen section histology.  No complications and routine wound care.  Patient is plavix  anticoagulants and risk of bleeding discussed with patient.       I have personally reviewed all specimens and/or slides and used them with my medical judgement to determine or confirm the final diagnosis.     Patient told result basal cell carcinoma schedule excision .      It was a pleasure speaking to Jacques Sharp today.  Previous clinic notes and pertinent laboratory tests were reviewed prior to Jacques Sharp's visit.  Nature and genetics of benign skin lesions dicussed with patient.  Signs and Symptoms of skin cancer discussed with patient.  Patient encouraged to perform monthly skin exams.  UV precautions reviewed with patient.  Risks of non-melanoma skin cancer discussed with patient   Return to clinic next appt

## 2022-02-27 ENCOUNTER — HEALTH MAINTENANCE LETTER (OUTPATIENT)
Age: 67
End: 2022-02-27

## 2022-02-28 NOTE — PROGRESS NOTES
Surgical Office Location :   [facility]  [address] [place] [address]

## 2022-03-01 ENCOUNTER — OFFICE VISIT (OUTPATIENT)
Dept: DERMATOLOGY | Facility: CLINIC | Age: 67
End: 2022-03-01
Payer: COMMERCIAL

## 2022-03-01 VITALS — DIASTOLIC BLOOD PRESSURE: 72 MMHG | HEART RATE: 72 BPM | OXYGEN SATURATION: 98 % | SYSTOLIC BLOOD PRESSURE: 135 MMHG

## 2022-03-01 DIAGNOSIS — C44.111 BASAL CELL CARCINOMA (BCC) OF RIGHT ORBIT: Primary | ICD-10-CM

## 2022-03-01 PROCEDURE — 99207 PR NO CHARGE LOS: CPT | Performed by: DERMATOLOGY

## 2022-03-01 PROCEDURE — 17311 MOHS 1 STAGE H/N/HF/G: CPT | Performed by: DERMATOLOGY

## 2022-03-01 PROCEDURE — 13152 CMPLX RPR E/N/E/L 2.6-7.5 CM: CPT | Performed by: DERMATOLOGY

## 2022-03-01 PROCEDURE — 17312 MOHS ADDL STAGE: CPT | Performed by: DERMATOLOGY

## 2022-03-01 NOTE — PROGRESS NOTES
Jacques Sharp is an extremely pleasant 66 year old year old male patient here today for evaluation and managment of basal cell carcinoma on right ofs.  Patient has no other skin complaints today.  Remainder of the HPI, Meds, PMH, Allergies, FH, and SH was reviewed in chart.      Past Medical History:   Diagnosis Date     Acute MI (H) 12/24/2013     Basal cell carcinoma      Coronary artery disease      Hyperlipidaemia      Hyperlipidaemia      Paroxysmal supraventricular tachycardia (H)        Past Surgical History:   Procedure Laterality Date     CORONARY ANGIOGRAPHY ADULT ORDER  1/30/14, 12/24/13     CV CORONARY ANGIOGRAM N/A 9/17/2019    Procedure: Coronary Angiogram;  Surgeon: Hoang Lozada MD;  Location:  [facility]     EP ABLATION / EP STUDIES  12/23/13     H ABLATION SVT  12/23/13     LUNG SURGERY      benign cyst removed.      VASECTOMY          Family History   Problem Relation Age of Onset     Hypertension Mother      Cancer Father         lung     Cancer - colorectal No family hx of      Prostate Cancer No family hx of        Social History     Socioeconomic History     Marital status: Single     Spouse name: Not on file     Number of children: Not on file     Years of education: Not on file     Highest education level: Not on file   Occupational History     Not on file   Tobacco Use     Smoking status: Former Smoker     Smokeless tobacco: Never Used     Tobacco comment: quit 30+ years ago   Substance and Sexual Activity     Alcohol use: No     Drug use: No     Sexual activity: Yes     Partners: Female   Other Topics Concern     Parent/sibling w/ CABG, MI or angioplasty before 65F 55M? Not Asked   Social History Narrative     Not on file     Social Determinants of Health     Financial Resource Strain: Not on file   Food Insecurity: Not on file   Transportation Needs: Not on file   Physical Activity: Not on file   Stress: Not on file   Social Connections: Not on file   Intimate Partner  Violence: Not on file   Housing Stability: Not on file       Outpatient Encounter Medications as of 3/1/2022   Medication Sig Dispense Refill     acetaminophen (TYLENOL) 325 MG tablet Take 1-2 tablets (325-650 mg) by mouth every 4 hours as needed 100 tablet      aspirin (ASA) 81 MG chewable tablet CHEW AND SWALLOW ONE TABLET BY MOUTH ONCE DAILY 90 tablet 3     atorvastatin (LIPITOR) 80 MG tablet TAKE ONE TABLET BY MOUTH ONCE DAILY 90 tablet 2     clopidogrel (PLAVIX) 75 MG tablet TAKE ONE TABLET BY MOUTH ONCE DAILY 90 tablet 1     metoprolol succinate ER (TOPROL-XL) 50 MG 24 hr tablet Take 1 tablet (50 mg) by mouth daily 90 tablet 3     nitroGLYcerin (NITROSTAT) 0.4 MG sublingual tablet Place 1 tablet (0.4 mg) under the tongue every 5 minutes as needed for chest pain 60 tablet 0     sildenafil (REVATIO) 20 MG tablet Take 1 tablet (20 mg) by mouth daily as needed (erectile dysfunction) DO NOT USE WITHIN 24 HOURS OF NITROGLYCERIN 25 tablet 3     No facility-administered encounter medications on file as of 3/1/2022.             O:   NAD, WDWN, Alert & Oriented, Mood & Affect wnl, Vitals stable   Here today alone   /72 (BP Location: Left arm, Patient Position: Sitting)   Pulse 72   SpO2 98%    General appearance normal   Vitals stable   Alert, oriented and in no acute distress     R ofs 1cm pink pearly papule       Eyes: Conjunctivae/lids:Normal     ENT: Lips, buccal mucosa, tongue: normal    MSK:Normal    Cardiovascular: peripheral edema none    Pulm: Breathing Normal    Neuro/Psych: Orientation:Alert and Orientedx3 ; Mood/Affect:normal       A/P:  1. R OFS basal cell carcinoma   MOHS:   Location    The rationale for Mohs surgery was discussed with the patient and consent was obtained.  The risks and benefits as well as alternatives to therapy were discussed, in detail.  Specifically, the risks of infection, scarring, bleeding, prolonged wound healing, incomplete removal, allergy to anesthesia, nerve injury and  recurrence were addressed.  Indication for Mohs was Location. Prior to the procedure, the treatment site was clearly identified and, if available, confirmed with previous photos and confirmed by the patient   All components of the Universal Protocol/PAUSE rule were completed.  The Mohs surgeon operated in two distinct and integrated capacities as the surgeon and pathologist.      The area was prepped with Betasept.  A rim of normal appearing skin was marked circumferentially around the lesion.  The area was infiltrated with local anesthesia.  The tumor was first debulked to remove all clinically apparent tumor.  An incision following the standard Mohs approach was done and the specimen was oriented,mapped and placed in 2 block(s).  Each specimen was then chromacoded and processed in the Mohs laboratory using standard Mohs technique and submitted for frozen section histology.  Frozen section analysis showed  residual tumor but CLEAR MARGINS.    First stage:Orthokeratosis of epidermis with a proliferation of nests of basaloid cells, with peripheral palisading and a haphazard arrangement in the center extending into the dermis, forming infiltrative strands.  .  The tumor cells have hyperchromatic nuclei. Poor cytoplasm and intercellular bridging.      The tumor was excised using standard Mohs technique in 2 stages(s).  CLEAR MARGINS OBTAINED and Final defect size was 1.6 x 1.5 cm.     We discussed the options for wound management in full with the patient including risks/benefits/ possible outcomes.        REPAIR COMPLEX: Because of the tightness of the surrounding skin and Because of the size and full thickness nature of the defect, Because of the tightness of the surrounding skin and Because of the proximity to the lid, a complex closure was planned. After LEC anesthesia and prep, Burow's triangles were excised in the relaxed skin tension lines. An M plasty was incised on right lower lid to avoid ectropion.  The wound  edges were widely undermined greater than width of the defect on both sides (1.5 cm) by dissection in the subcutaneous plane until adequate tissue mobility was obtained. Hemostasis was obtained. The wound edges were closed in a layered fashion using Vicryl and Fast Absorbing Plain Gut sutures. Postoperative length was 4.6 cm.   EBL minimal; complications none; wound care routine.  The patient was discharged in good condition and will return in one week for wound evaluation.  It was a pleasure speaking to Jacques Sharp today.  Previous clinic notes and pertinent laboratory tests were reviewed prior to Jacques Sharp's visit.  Nature and genetics of benign skin lesions dicussed with patient.  Signs and Symptoms of skin cancer discussed with patient.  Patient encouraged to perform monthly skin exams.  UV precautions reviewed with patient.  Risks of non-melanoma skin cancer discussed with patient   Return to clinic 6 months

## 2022-03-01 NOTE — LETTER
3/1/2022         RE: Jacques Sharp  [address] [place] [address]        Dear Colleague,    Thank you for referring your patient, Jacques Sharp, to the [facility]. Please see a copy of my visit note below.    Surgical Office Location :   [facility]  [address] [place] [address]      Jacques Shrap is an extremely pleasant 66 year old year old male patient here today for evaluation and managment of basal cell carcinoma on right ofs.  Patient has no other skin complaints today.  Remainder of the HPI, Meds, PMH, Allergies, FH, and SH was reviewed in chart.      Past Medical History:   Diagnosis Date     Acute MI (H) 12/24/2013     Basal cell carcinoma      Coronary artery disease      Hyperlipidaemia      Hyperlipidaemia      Paroxysmal supraventricular tachycardia (H)        Past Surgical History:   Procedure Laterality Date     CORONARY ANGIOGRAPHY ADULT ORDER  1/30/14, 12/24/13     CV CORONARY ANGIOGRAM N/A 9/17/2019    Procedure: Coronary Angiogram;  Surgeon: Hoang Lozada MD;  Location: [facility] CARDIAC CATH LAB     EP ABLATION / EP STUDIES  12/23/13     H ABLATION SVT  12/23/13     LUNG SURGERY      benign cyst removed.      VASECTOMY          Family History   Problem Relation Age of Onset     Hypertension Mother      Cancer Father         lung     Cancer - colorectal No family hx of      Prostate Cancer No family hx of        Social History     Socioeconomic History     Marital status: Single     Spouse name: Not on file     Number of children: Not on file     Years of education: Not on file     Highest education level: Not on file   Occupational History     Not on file   Tobacco Use     Smoking status: Former Smoker     Smokeless tobacco: Never Used     Tobacco comment: quit 30+ years ago   Substance and Sexual Activity     Alcohol use: No     Drug use: No     Sexual activity: Yes     Partners: Female   Other Topics Concern     Parent/sibling w/  CABG, MI or angioplasty before 65F 55M? Not Asked   Social History Narrative     Not on file     Social Determinants of Health     Financial Resource Strain: Not on file   Food Insecurity: Not on file   Transportation Needs: Not on file   Physical Activity: Not on file   Stress: Not on file   Social Connections: Not on file   Intimate Partner Violence: Not on file   Housing Stability: Not on file       Outpatient Encounter Medications as of 3/1/2022   Medication Sig Dispense Refill     acetaminophen (TYLENOL) 325 MG tablet Take 1-2 tablets (325-650 mg) by mouth every 4 hours as needed 100 tablet      aspirin (ASA) 81 MG chewable tablet CHEW AND SWALLOW ONE TABLET BY MOUTH ONCE DAILY 90 tablet 3     atorvastatin (LIPITOR) 80 MG tablet TAKE ONE TABLET BY MOUTH ONCE DAILY 90 tablet 2     clopidogrel (PLAVIX) 75 MG tablet TAKE ONE TABLET BY MOUTH ONCE DAILY 90 tablet 1     metoprolol succinate ER (TOPROL-XL) 50 MG 24 hr tablet Take 1 tablet (50 mg) by mouth daily 90 tablet 3     nitroGLYcerin (NITROSTAT) 0.4 MG sublingual tablet Place 1 tablet (0.4 mg) under the tongue every 5 minutes as needed for chest pain 60 tablet 0     sildenafil (REVATIO) 20 MG tablet Take 1 tablet (20 mg) by mouth daily as needed (erectile dysfunction) DO NOT USE WITHIN 24 HOURS OF NITROGLYCERIN 25 tablet 3     No facility-administered encounter medications on file as of 3/1/2022.             O:   NAD, WDWN, Alert & Oriented, Mood & Affect wnl, Vitals stable   Here today alone   /72 (BP Location: Left arm, Patient Position: Sitting)   Pulse 72   SpO2 98%    General appearance normal   Vitals stable   Alert, oriented and in no acute distress     R ofs 1cm pink pearly papule       Eyes: Conjunctivae/lids:Normal     ENT: Lips, buccal mucosa, tongue: normal    MSK:Normal    Cardiovascular: peripheral edema none    Pulm: Breathing Normal    Neuro/Psych: Orientation:Alert and Orientedx3 ; Mood/Affect:normal       A/P:  1. R OFS basal cell  carcinoma   MOHS:   Location    The rationale for Mohs surgery was discussed with the patient and consent was obtained.  The risks and benefits as well as alternatives to therapy were discussed, in detail.  Specifically, the risks of infection, scarring, bleeding, prolonged wound healing, incomplete removal, allergy to anesthesia, nerve injury and recurrence were addressed.  Indication for Mohs was Location. Prior to the procedure, the treatment site was clearly identified and, if available, confirmed with previous photos and confirmed by the patient   All components of the Universal Protocol/PAUSE rule were completed.  The Mohs surgeon operated in two distinct and integrated capacities as the surgeon and pathologist.      The area was prepped with Betasept.  A rim of normal appearing skin was marked circumferentially around the lesion.  The area was infiltrated with local anesthesia.  The tumor was first debulked to remove all clinically apparent tumor.  An incision following the standard Mohs approach was done and the specimen was oriented,mapped and placed in 2 block(s).  Each specimen was then chromacoded and processed in the Mohs laboratory using standard Mohs technique and submitted for frozen section histology.  Frozen section analysis showed  residual tumor but CLEAR MARGINS.    First stage:Orthokeratosis of epidermis with a proliferation of nests of basaloid cells, with peripheral palisading and a haphazard arrangement in the center extending into the dermis, forming infiltrative strands.  .  The tumor cells have hyperchromatic nuclei. Poor cytoplasm and intercellular bridging.      The tumor was excised using standard Mohs technique in 2 stages(s).  CLEAR MARGINS OBTAINED and Final defect size was 1.6 x 1.5 cm.     We discussed the options for wound management in full with the patient including risks/benefits/ possible outcomes.        REPAIR COMPLEX: Because of the tightness of the surrounding skin and  Because of the size and full thickness nature of the defect, Because of the tightness of the surrounding skin and Because of the proximity to the lid, a complex closure was planned. After LEC anesthesia and prep, Burow's triangles were excised in the relaxed skin tension lines. An M plasty was incised on right lower lid to avoid ectropion.  The wound edges were widely undermined greater than width of the defect on both sides (1.5 cm) by dissection in the subcutaneous plane until adequate tissue mobility was obtained. Hemostasis was obtained. The wound edges were closed in a layered fashion using Vicryl and Fast Absorbing Plain Gut sutures. Postoperative length was 4.6 cm.   EBL minimal; complications none; wound care routine.  The patient was discharged in good condition and will return in one week for wound evaluation.  It was a pleasure speaking to Jacques Sharp today.  Previous clinic notes and pertinent laboratory tests were reviewed prior to Jacques Sharp's visit.  Nature and genetics of benign skin lesions dicussed with patient.  Signs and Symptoms of skin cancer discussed with patient.  Patient encouraged to perform monthly skin exams.  UV precautions reviewed with patient.  Risks of non-melanoma skin cancer discussed with patient   Return to clinic 6 months        Again, thank you for allowing me to participate in the care of your patient.        Sincerely,        Matt Freeman MD

## 2022-03-01 NOTE — PATIENT INSTRUCTIONS
Sutured Wound Care   for right cheek    [facility]: [phone]    [facility]: [phone]          ? No strenuous activity for 48 hours. Resume moderate activity in 48 hours. No heavy exercising until you are seen for follow up in one week.     ? Take Tylenol as needed for discomfort.                         ? Do not drink alcoholic beverages for 48 hours.     ? Keep the pressure bandage in place for 24 hours. If the bandage becomes blood tinged or loose, reinforce it with gauze and tape.        (Refer to the reverse side of this page for management of bleeding).    ? Remove pressure bandage in 24 hours (White)    ? Leave the flat bandage in place until your follow up appointment. (Brown)    ? Keep the bandage dry. Wash around it carefully.    ? If the tape becomes soiled or starts to come off, reinforce it with additional paper tape.    ? Do not smoke for 3 weeks; smoking is detrimental to wound healing.    ? It is normal to have swelling and bruising around the surgical site. The bruising will fade in approximately 10-14 days. Elevate the area to reduce swelling.    ? Numbness, itchiness and sensitivity to temperature changes can occur after surgery and may take up to 18 months to normalize.      POSSIBLE COMPLICATIONS    BLEEDIN. Leave the bandage in place.  2. Use tightly rolled up gauze or a cloth to apply direct pressure over the bandage for 20   minutes.  3. Reapply pressure for an additional 20 minutes if necessary  4. Call the office or go to the nearest emergency room if pressure fails to stop the bleeding.  5. Use additional gauze and tape to maintain pressure once the bleeding has stopped.        PAIN:    1. Post operative pain should slowly get better, never worse.  2. A severe increase in pain may indicate a problem. Call the office if this occurs.    In case of emergency phone:Dr Freeman [phone]

## 2022-03-01 NOTE — NURSING NOTE
"Initial /72 (BP Location: Left arm, Patient Position: Sitting)   Pulse 72   SpO2 98%  Estimated body mass index is 27.12 kg/m  as calculated from the following:    Height as of 12/21/20: 1.626 m (5' 4\").    Weight as of 10/8/21: 71.7 kg (158 lb). .      "

## 2022-03-08 ENCOUNTER — OFFICE VISIT (OUTPATIENT)
Dept: DERMATOLOGY | Facility: CLINIC | Age: 67
End: 2022-03-08
Payer: COMMERCIAL

## 2022-03-08 DIAGNOSIS — Z48.01 ENCOUNTER FOR CHANGE OR REMOVAL OF SURGICAL WOUND DRESSING: Primary | ICD-10-CM

## 2022-03-08 PROCEDURE — 99207 PR NO CHARGE NURSE ONLY: CPT

## 2022-03-08 NOTE — PROGRESS NOTES
Pt returned to clinic for post surgery 1 week follow up bandage change. Pt has no complaints, denies pain. Bandage removed right orbit, area cleansed with normal saline. Site is healing and wound edges approximating well. Reapplied new steri strips and paper tape.    Advised to watch for signs/sx of infection; spreading redness, drainage, odor, fever. Call or report promptly to clinic. Pt given written instructions and informed to rtc as needed. Patient verbalized understanding.        Haley Mcghee on 3/8/2022 at 1:24 PM

## 2022-06-07 ENCOUNTER — TELEPHONE (OUTPATIENT)
Dept: FAMILY MEDICINE | Facility: CLINIC | Age: 67
End: 2022-06-07
Payer: COMMERCIAL

## 2022-06-07 NOTE — TELEPHONE ENCOUNTER
Patient Quality Outreach    Patient is due for the following:   Colon Cancer Screening -  FIT and Colonoscopy  Physical  - Due after 04/01/2021    NEXT STEPS:   Schedule a yearly physical    Type of outreach:    Sent [employer] message.      Questions for provider review:    None     Sylvia Cope CMA

## 2022-06-13 ENCOUNTER — TELEPHONE (OUTPATIENT)
Dept: CARDIOLOGY | Facility: CLINIC | Age: 67
End: 2022-06-13
Payer: COMMERCIAL

## 2022-06-13 DIAGNOSIS — I10 HTN (HYPERTENSION): Primary | ICD-10-CM

## 2022-06-13 RX ORDER — AMLODIPINE BESYLATE 5 MG/1
5 TABLET ORAL DAILY
Qty: 30 TABLET | Refills: 3 | Status: SHIPPED | OUTPATIENT
Start: 2022-06-13 | End: 2022-09-02 | Stop reason: SINTOL

## 2022-06-13 NOTE — TELEPHONE ENCOUNTER
Pt calls in to report that his BP has been creeping up over the last few weeks. It is now 132/63 to low 140s/70s. States he has been feeling good, but feels like his BP is too high. Pt states he was on lisinopril before and had to stop it because it made his BP go too low and he felt awful. He would like this message to go to Dr Caballero as he doesn't want to wait until Wednesday for Kelsy Farmer NP to return to the office. Coral Wallace RN Cardiology June 13, 2022, 11:45 AM

## 2022-06-13 NOTE — TELEPHONE ENCOUNTER
He can start amlodipine 5 mg once daily.  There is a small risk of leg edema.  This should help with blood pressure.    Jonny    ADDENDUM: Disc with patient--disc ankle edema. He is willing to start. Script sent. Pt will keep us updated. Coral Wallace RN Cardiology June 13, 2022, 1:23 PM

## 2022-06-17 DIAGNOSIS — I47.10 PAROXYSMAL SUPRAVENTRICULAR TACHYCARDIA (H): ICD-10-CM

## 2022-06-21 NOTE — TELEPHONE ENCOUNTER
"Requested Prescriptions   Pending Prescriptions Disp Refills    clopidogrel (PLAVIX) 75 MG tablet [Pharmacy Med Name: CLOPIDOGREL BISULFATE 75MG TABS] 90 tablet 1     Sig: TAKE ONE TABLET BY MOUTH ONCE DAILY        Plavix Failed - 6/17/2022 12:54 PM        Failed - Normal HGB on file in past 12 months     Recent Labs   Lab Test 09/21/20  1308   HGB 14.7                 Failed - Normal Platelets on file in past 12 months       Recent Labs   Lab Test 09/21/20  1308                  Failed - Recent (12 mo) or future (30 days) visit within the authorizing provider's specialty     Patient has had an office visit with the authorizing provider or a provider within the authorizing providers department within the previous 12 mos or has a future within next 30 days. See \"Patient Info\" tab in inbasket, or \"Choose Columns\" in Meds & Orders section of the refill encounter.              Passed - No active PPI on record unless is Protonix        Passed - Medication is active on med list        Passed - Patient is age 18 or older              "

## 2022-06-23 RX ORDER — CLOPIDOGREL BISULFATE 75 MG/1
TABLET ORAL
Qty: 90 TABLET | Refills: 2 | Status: SHIPPED | OUTPATIENT
Start: 2022-06-23 | End: 2022-09-15

## 2022-09-02 ENCOUNTER — TELEPHONE (OUTPATIENT)
Dept: CARDIOLOGY | Facility: CLINIC | Age: 67
End: 2022-09-02

## 2022-09-02 NOTE — TELEPHONE ENCOUNTER
Called pt to discuss. Pt's med list states Toprol 50 mg daily but he takes 1/2 tab BID instead. In June, amlodipine 5 mg every day was added due to high BPs. Pt states he started getting lower BPs and HRs last week so he cut amlodipine in half and took 1/2 tab (2.5 mg) daily. He noted no change so stopped amlodipine yesterday and took none yesterday or today. He gave the following BPs and HRs:   8/29: 110/52   53  8/30: 116/50   51  8/31: 108/50   48  9/1: 102/52   50-60  9/2: HR 40s  (Pt noted that BP machine read HR as in the low 30s so he took it on his wrist and it was in the low 40s). He has a hx of PVCs and PSVT. He does complain of some lightheadedness. Will discuss with Dr Caballero as Kelsy Farmer NP is out of the office. Pt has 9/15/22 visit with Dr Caballero coming up. Coral Wallace RN Cardiology September 2, 2022, 11:20 AM

## 2022-09-02 NOTE — TELEPHONE ENCOUNTER
He can stop amlodipine, monitor blood pressures.  Call back if blood pressure gets too high.    Jonny

## 2022-09-02 NOTE — TELEPHONE ENCOUNTER
[facility] Call Center    Phone Message    May a detailed message be left on voicemail: no     Reason for Call: Other: Jacques called to report he has been experiencing low B/P and dizziness. He stated he cut his Rx amLODIPine (NORVASC) 5 MG tablet in half and has had no changes in symptoms. Please reach out to Jacques at [phone].      Action Taken: Other: [place] [facility]    Travel Screening: Not Applicable

## 2022-09-02 NOTE — TELEPHONE ENCOUNTER
Disc with pt. He will stop amlodipine, monitor BP and HR, keep and log and stay well hydrated. He will bring log to his appt with Dr Caballero on 9/15/22 and call prior if BPs are getting high. Pt verbalized understanding and agreed with plan. Med list updated. Coral Wallace RN Cardiology September 2, 2022, 1:10 PM

## 2022-09-06 ENCOUNTER — TELEPHONE (OUTPATIENT)
Dept: FAMILY MEDICINE | Facility: CLINIC | Age: 67
End: 2022-09-06

## 2022-09-06 NOTE — TELEPHONE ENCOUNTER
Patient Quality Outreach    Patient is due for the following:   Colon Cancer Screening  Physical Preventive Adult Physical    Next Steps:   Schedule a Annual Wellness Visit    Type of outreach:    Sent [employer] message.      Questions for provider review:    None     Sylvia Cope CMA

## 2022-09-07 DIAGNOSIS — E78.5 HYPERLIPIDEMIA LDL GOAL <70: Primary | ICD-10-CM

## 2022-09-07 DIAGNOSIS — I25.10 CORONARY ARTERY DISEASE INVOLVING NATIVE CORONARY ARTERY OF NATIVE HEART WITHOUT ANGINA PECTORIS: ICD-10-CM

## 2022-09-07 DIAGNOSIS — I10 HTN (HYPERTENSION): ICD-10-CM

## 2022-09-07 NOTE — PROGRESS NOTES
CARDIOLOGY VISIT    REASON FOR VISIT: CAD    SUBJECTIVE:  67-year-old male seen for follow-up of SVT and coronary artery disease.     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  He had a postop inferior STEMI with subsequent drug-eluting stent to the RCA and then staged intervention of the OM 2.  He was also noted to have atrial tachycardia originating near the his node and no ablation was pursued.  He has been on beta blocker therapy since then.           Echo 2014 showed ejection fraction 50-55% with mild inferior and inferolateral hypokinesis. Nuclear stress 2015 small fixed inferior wall defect consistent with infarct, no ischemia, ejection fraction 51% with mild inferior hypokinesis.        2019 he presented to [facility] with inferior STEMI, troponin 47.  Angiogram showed 100% mid RCA lesion, 80% RPDA, 60% third RPL, 45% proximal to mid LAD, 25% proximal circumflex.  This was a late stent thrombosis of the distal RCA, repeat drug-eluting stents placed.  Echo showed EF 55%, inferior and inferolateral akinesis, normal RV, no valve disease.    Holter monitor 2019 showed sinus rhythm, 4% PVCs.    He has been doing very well recently.  He will use an elliptical machine for 30 minutes and denies any chest pain or significant dyspnea.  Blood pressure runs 110/60 at home with heart rate in the 50s.  He has rare palpitations.    MEDICATIONS:  Current Outpatient Medications   Medication     acetaminophen (TYLENOL) 325 MG tablet     aspirin (ASA) 81 MG chewable tablet     atorvastatin (LIPITOR) 80 MG tablet     clopidogrel (PLAVIX) 75 MG tablet     metoprolol succinate ER (TOPROL-XL) 50 MG 24 hr tablet     nitroGLYcerin (NITROSTAT) 0.4 MG sublingual tablet     sildenafil (REVATIO) 20 MG tablet     No current facility-administered medications for this visit.       ALLERGIES:  Allergies   Allergen Reactions     Sulfa Drugs        REVIEW OF SYSTEMS:  Constitutional:  No weight loss, fever, chills  HEENT:  Eyes:   No visual loss, blurred vision, double vision or yellow sclerae. No hearing loss, sneezing, congestion, runny nose or sore throat.  Skin:  No rash or itching.  Cardiovascular: per HPI  Respiratory: per HPI  GI:  No anorexia, nausea, vomiting or diarrhea. No abdominal pain or blood.  :  No dysurea, hematuria  Neurologic:  No headache, paralysis, ataxia, numbness or tingling in the extremities. No change in bowel or bladder control.  Musculoskeletal:  No muscle pain  Hematologic:  No bleeding or bruising.  Lymphatics:  No enlarged nodes. No history of splenectomy.  Endocrine:  No reports of sweating, cold or heat intolerance. No polyuria or polydipsia.  Allergies:  No history of asthma, hives, eczema or rhinitis.    PHYSICAL EXAM:  BP (!) 152/82   Pulse 55   Temp 97.5  F (36.4  C) (Tympanic)   Wt 74.8 kg (165 lb)   SpO2 97%   BMI 28.32 kg/m      Constitutional: awake, alert, no distress  Eyes: PERRL, sclera nonicteric  ENT: trachea midline  Respiratory: Lungs clear  Cardiovascular: Mild bradycardia, regular rhythm, no ectopy, no murmurs  GI: nondistended, nontender, bowel sounds present  Lymph/Hematologic: no lymphadenopathy  Skin: dry, no rash  Musculoskeletal: good muscle tone, strength 5/5 in upper and lower extremities  Neurologic: no focal deficits  Neuropsychiatric: appropriate affact    DATA:  Lab: September 2022: Creatinine 0.9  Recent Labs   Lab Test 09/09/21  0732 07/31/20  0725 03/13/17  1141 12/01/14  1228   CHOL 133 139   < > 173   HDL 44 47   < > 42   LDL 69 78   < > 100   TRIG 102 71   < > 155*   CHOLHDLRATIO  --   --   --  4.1    < > = values in this interval not displayed.     ASSESSMENT:  67-year-old male seen for CAD.  He is doing well with no concerning symptoms.  He has a good functional capacity.  Lipids and blood pressure are at goal.  PVCs seem to be quite minimal.    RECOMMENDATIONS:  1.  CAD   - continue current medications    2. PVCs with history SVT ablation  -Continue  metoprolol    Follow-up in 1 year with FLORENCIA.    Alvaro Caballero MD  Cardiology - [facility]  Pager:  [phone]  Text Page  September 15, 2022

## 2022-09-09 ENCOUNTER — LAB (OUTPATIENT)
Dept: LAB | Facility: CLINIC | Age: 67
End: 2022-09-09
Payer: COMMERCIAL

## 2022-09-09 DIAGNOSIS — I25.10 CORONARY ARTERY DISEASE INVOLVING NATIVE CORONARY ARTERY OF NATIVE HEART WITHOUT ANGINA PECTORIS: ICD-10-CM

## 2022-09-09 DIAGNOSIS — E78.5 HYPERLIPIDEMIA LDL GOAL <70: ICD-10-CM

## 2022-09-09 DIAGNOSIS — I10 HTN (HYPERTENSION): ICD-10-CM

## 2022-09-09 LAB
ANION GAP SERPL CALCULATED.3IONS-SCNC: 10 MMOL/L (ref 7–15)
BUN SERPL-MCNC: 11.7 MG/DL (ref 8–23)
CALCIUM SERPL-MCNC: 8.8 MG/DL (ref 8.8–10.2)
CHLORIDE SERPL-SCNC: 103 MMOL/L (ref 98–107)
CHOLEST SERPL-MCNC: 124 MG/DL
CREAT SERPL-MCNC: 0.86 MG/DL (ref 0.67–1.17)
DEPRECATED HCO3 PLAS-SCNC: 26 MMOL/L (ref 22–29)
ERYTHROCYTE [DISTWIDTH] IN BLOOD BY AUTOMATED COUNT: 13 % (ref 10–15)
GFR SERPL CREATININE-BSD FRML MDRD: >90 ML/MIN/1.73M2
GLUCOSE SERPL-MCNC: 96 MG/DL (ref 70–99)
HCT VFR BLD AUTO: 39 % (ref 40–53)
HDLC SERPL-MCNC: 36 MG/DL
HGB BLD-MCNC: 13 G/DL (ref 13.3–17.7)
LDLC SERPL CALC-MCNC: 69 MG/DL
MCH RBC QN AUTO: 26.3 PG (ref 26.5–33)
MCHC RBC AUTO-ENTMCNC: 33.3 G/DL (ref 31.5–36.5)
MCV RBC AUTO: 79 FL (ref 78–100)
NONHDLC SERPL-MCNC: 88 MG/DL
PLATELET # BLD AUTO: 178 10E3/UL (ref 150–450)
POTASSIUM SERPL-SCNC: 4.1 MMOL/L (ref 3.4–5.3)
RBC # BLD AUTO: 4.94 10E6/UL (ref 4.4–5.9)
SODIUM SERPL-SCNC: 139 MMOL/L (ref 136–145)
TRIGL SERPL-MCNC: 94 MG/DL
WBC # BLD AUTO: 5.4 10E3/UL (ref 4–11)

## 2022-09-09 PROCEDURE — 36415 COLL VENOUS BLD VENIPUNCTURE: CPT

## 2022-09-09 PROCEDURE — 80061 LIPID PANEL: CPT

## 2022-09-09 PROCEDURE — 80048 BASIC METABOLIC PNL TOTAL CA: CPT

## 2022-09-09 PROCEDURE — 85027 COMPLETE CBC AUTOMATED: CPT

## 2022-09-12 NOTE — RESULT ENCOUNTER NOTE
Electrolytes and kidney function WNL; lipids stable and at goal; H&H decreased from previous and now slightly abnormal. Follow up with Dr Caballero on 9/15/22

## 2022-09-15 ENCOUNTER — OFFICE VISIT (OUTPATIENT)
Dept: CARDIOLOGY | Facility: CLINIC | Age: 67
End: 2022-09-15
Attending: NURSE PRACTITIONER
Payer: COMMERCIAL

## 2022-09-15 VITALS
HEART RATE: 55 BPM | OXYGEN SATURATION: 97 % | SYSTOLIC BLOOD PRESSURE: 110 MMHG | BODY MASS INDEX: 28.32 KG/M2 | WEIGHT: 165 LBS | TEMPERATURE: 97.5 F | DIASTOLIC BLOOD PRESSURE: 60 MMHG

## 2022-09-15 DIAGNOSIS — I10 BENIGN ESSENTIAL HYPERTENSION: ICD-10-CM

## 2022-09-15 DIAGNOSIS — I47.10 PAROXYSMAL SUPRAVENTRICULAR TACHYCARDIA (H): ICD-10-CM

## 2022-09-15 DIAGNOSIS — E78.5 HYPERLIPIDEMIA LDL GOAL <70: ICD-10-CM

## 2022-09-15 DIAGNOSIS — I49.3 PVC'S (PREMATURE VENTRICULAR CONTRACTIONS): ICD-10-CM

## 2022-09-15 DIAGNOSIS — I25.10 CORONARY ARTERY DISEASE INVOLVING NATIVE CORONARY ARTERY OF NATIVE HEART WITHOUT ANGINA PECTORIS: ICD-10-CM

## 2022-09-15 DIAGNOSIS — R00.2 PALPITATIONS: ICD-10-CM

## 2022-09-15 PROCEDURE — 99214 OFFICE O/P EST MOD 30 MIN: CPT | Performed by: INTERNAL MEDICINE

## 2022-09-15 RX ORDER — ATORVASTATIN CALCIUM 80 MG/1
80 TABLET, FILM COATED ORAL DAILY
Qty: 90 TABLET | Refills: 3 | Status: SHIPPED | OUTPATIENT
Start: 2022-09-15 | End: 2023-10-31

## 2022-09-15 RX ORDER — CLOPIDOGREL BISULFATE 75 MG/1
75 TABLET ORAL DAILY
Qty: 90 TABLET | Refills: 3 | Status: SHIPPED | OUTPATIENT
Start: 2022-09-15 | End: 2023-10-31

## 2022-09-15 RX ORDER — METOPROLOL SUCCINATE 50 MG/1
50 TABLET, EXTENDED RELEASE ORAL DAILY
Qty: 90 TABLET | Refills: 3 | Status: SHIPPED | OUTPATIENT
Start: 2022-09-15 | End: 2023-10-31

## 2022-09-15 NOTE — LETTER
9/15/2022    Celia Lu PA-C  [address] [place] [id number]    RE: Jacques Sharp       Dear Colleague,     I had the pleasure of seeing Jacques Sharp in the [facility].  CARDIOLOGY VISIT    REASON FOR VISIT: CAD    SUBJECTIVE:  67-year-old male seen for follow-up of SVT and coronary artery disease.     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  He had a postop inferior STEMI with subsequent drug-eluting stent to the RCA and then staged intervention of the OM 2.  He was also noted to have atrial tachycardia originating near the his node and no ablation was pursued.  He has been on beta blocker therapy since then.           Echo 2014 showed ejection fraction 50-55% with mild inferior and inferolateral hypokinesis. Nuclear stress 2015 small fixed inferior wall defect consistent with infarct, no ischemia, ejection fraction 51% with mild inferior hypokinesis.        2019 he presented to [facility] with inferior STEMI, troponin 47.  Angiogram showed 100% mid RCA lesion, 80% RPDA, 60% third RPL, 45% proximal to mid LAD, 25% proximal circumflex.  This was a late stent thrombosis of the distal RCA, repeat drug-eluting stents placed.  Echo showed EF 55%, inferior and inferolateral akinesis, normal RV, no valve disease.    Holter monitor 2019 showed sinus rhythm, 4% PVCs.    He has been doing very well recently.  He will use an elliptical machine for 30 minutes and denies any chest pain or significant dyspnea.  Blood pressure runs 110/60 at home with heart rate in the 50s.  He has rare palpitations.    MEDICATIONS:  Current Outpatient Medications   Medication     acetaminophen (TYLENOL) 325 MG tablet     aspirin (ASA) 81 MG chewable tablet     atorvastatin (LIPITOR) 80 MG tablet     clopidogrel (PLAVIX) 75 MG tablet     metoprolol succinate ER (TOPROL-XL) 50 MG 24 hr tablet     nitroGLYcerin (NITROSTAT) 0.4 MG sublingual tablet     sildenafil (REVATIO) 20 MG tablet     No current  facility-administered medications for this visit.       ALLERGIES:  Allergies   Allergen Reactions     Sulfa Drugs        REVIEW OF SYSTEMS:  Constitutional:  No weight loss, fever, chills  HEENT:  Eyes:  No visual loss, blurred vision, double vision or yellow sclerae. No hearing loss, sneezing, congestion, runny nose or sore throat.  Skin:  No rash or itching.  Cardiovascular: per HPI  Respiratory: per HPI  GI:  No anorexia, nausea, vomiting or diarrhea. No abdominal pain or blood.  :  No dysurea, hematuria  Neurologic:  No headache, paralysis, ataxia, numbness or tingling in the extremities. No change in bowel or bladder control.  Musculoskeletal:  No muscle pain  Hematologic:  No bleeding or bruising.  Lymphatics:  No enlarged nodes. No history of splenectomy.  Endocrine:  No reports of sweating, cold or heat intolerance. No polyuria or polydipsia.  Allergies:  No history of asthma, hives, eczema or rhinitis.    PHYSICAL EXAM:  BP (!) 152/82   Pulse 55   Temp 97.5  F (36.4  C) (Tympanic)   Wt 74.8 kg (165 lb)   SpO2 97%   BMI 28.32 kg/m      Constitutional: awake, alert, no distress  Eyes: PERRL, sclera nonicteric  ENT: trachea midline  Respiratory: Lungs clear  Cardiovascular: Mild bradycardia, regular rhythm, no ectopy, no murmurs  GI: nondistended, nontender, bowel sounds present  Lymph/Hematologic: no lymphadenopathy  Skin: dry, no rash  Musculoskeletal: good muscle tone, strength 5/5 in upper and lower extremities  Neurologic: no focal deficits  Neuropsychiatric: appropriate affact    DATA:  Lab: September 2022: Creatinine 0.9  Recent Labs   Lab Test 09/09/21  0732 07/31/20  0725 03/13/17  1141 12/01/14  1228   CHOL 133 139   < > 173   HDL 44 47   < > 42   LDL 69 78   < > 100   TRIG 102 71   < > 155*   CHOLHDLRATIO  --   --   --  4.1    < > = values in this interval not displayed.     ASSESSMENT:  67-year-old male seen for CAD.  He is doing well with no concerning symptoms.  He has a good functional  capacity.  Lipids and blood pressure are at goal.  PVCs seem to be quite minimal.    RECOMMENDATIONS:  1.  CAD   - continue current medications    2. PVCs with history SVT ablation  -Continue metoprolol    Follow-up in 1 year with FLORENCIA.    Alvaro Caballero MD  Cardiology - [facility]  Pager:  [phone]  Text Page  September 15, 2022      Thank you for allowing me to participate in the care of your patient.      Sincerely,     Alvaro Caballero MD     [facility]  cc:   Kelsy Farmer, APRN CNP  [address]  [place] [address]

## 2022-11-19 ENCOUNTER — HEALTH MAINTENANCE LETTER (OUTPATIENT)
Age: 67
End: 2022-11-19

## 2022-11-30 ENCOUNTER — LAB (OUTPATIENT)
Dept: FAMILY MEDICINE | Facility: CLINIC | Age: 67
End: 2022-11-30
Payer: COMMERCIAL

## 2022-11-30 DIAGNOSIS — Z20.822 SUSPECTED COVID-19 VIRUS INFECTION: ICD-10-CM

## 2022-11-30 LAB — SARS-COV-2 RNA RESP QL NAA+PROBE: POSITIVE

## 2022-11-30 PROCEDURE — 99207 PR NO CHARGE LOS: CPT

## 2022-11-30 PROCEDURE — U0003 INFECTIOUS AGENT DETECTION BY NUCLEIC ACID (DNA OR RNA); SEVERE ACUTE RESPIRATORY SYNDROME CORONAVIRUS 2 (SARS-COV-2) (CORONAVIRUS DISEASE [COVID-19]), AMPLIFIED PROBE TECHNIQUE, MAKING USE OF HIGH THROUGHPUT TECHNOLOGIES AS DESCRIBED BY CMS-2020-01-R: HCPCS

## 2022-11-30 PROCEDURE — U0005 INFEC AGEN DETEC AMPLI PROBE: HCPCS

## 2022-12-01 ENCOUNTER — TELEPHONE (OUTPATIENT)
Dept: FAMILY MEDICINE | Facility: CLINIC | Age: 67
End: 2022-12-01

## 2022-12-01 ENCOUNTER — TELEPHONE (OUTPATIENT)
Dept: NURSING | Facility: CLINIC | Age: 67
End: 2022-12-01

## 2022-12-01 DIAGNOSIS — Z12.11 COLON CANCER SCREENING: Primary | ICD-10-CM

## 2022-12-01 NOTE — TELEPHONE ENCOUNTER
Coronavirus (COVID-19) Notification    Caller Name (Patient, parent, daughter/son, grandparent, etc)  Patient     Reason for call  Notify of Positive Coronavirus (COVID-19) lab results, assess symptoms,  review [facility] recommendations    Lab Result    Lab test:  2019-nCoV rRt-PCR or SARS-CoV-2 PCR    Oropharyngeal AND/OR nasopharyngeal swabs is POSITIVE for 2019-nCoV RNA/SARS-COV-2 PCR (COVID-19 virus)      Gather patient reported symptoms   Assessment   Current Symptoms at time of phone call, reported by patient: (if no symptoms, document: No symptoms] Congestion    Date of symptom(s) onset (if applicable) 11-18-22     If at time of call, Patients symptoms have worsened, the Patient should contact 911 or have someone drive them to Emergency Dept promptly:      If Patient calling 911, inform 911 personal that you have tested positive for the Coronavirus (COVID-19).  Place mask on and await 911 to arrive.    If Emergency Dept, If possible, please have another adult drive you to the Emergency Dept but you need to wear mask when in contact with other people.      Treatment Options:   Patient classified as COVID treatment eligible by Epic high risk algorithm: Yes  Is the patient symptomatic at the time of result notification? No    Review information with Patient    Your result was positive. This means you have COVID-19 (coronavirus).    How can I protect others?    These guidelines are for isolating before returning to work, school or .    If you DO have symptoms    Stay home and away from others     For at least 5 days after your symptoms started, AND    You are fever free for 24 hours (with no medicine that reduces fever), AND    Your other symptoms are better    Wear a mask for 10 full days anytime you are around others    If you DON'T have symptoms    Stay home and away from others for at least 5 days after your positive test    Wear a mask for 10 full days anytime you are around others    There  may be different guidelines for healthcare facilities.  Please check with the specific sites before arriving.    If you have been told by a doctor that you were severely ill with COVID-19 or are immunocompromised, you should isolate for at least 10 days.    You should not go back to work until you meet the guidelines above for ending your home isolation. You don't need to be retested for COVID-19 before going back to work--studies show that you won't spread the virus if it's been at least 10 days since your symptoms started (or 20 days, if you have a weak immune system).    Employers, schools, and daycares: This is an official notice for this person's medical guidelines for returning in-person.  They must meet the above guidelines before going back to work, school or  in person.    You will receive a positive COVID-19 letter via [employer] or the mail soon with additional self-care information.    Would you like me to review some of that information with you now?  No    If you were tested for an upcoming procedure, please contact your provider for next steps.    Josselyn Elias

## 2022-12-01 NOTE — TELEPHONE ENCOUNTER
Pt has had covid symptoms for 2 weeks, had positive home covid tests, had positive clinic lab test 11/30. Pt was given CDC guidelines for quarantine and advised to continue to wear a mask in public to prevent other viral illnesses.   Pt is overdue for yearly visit with PCP, appt made for 1st available 1/4/2023. Pt aware he needs to be fasting. Marleny Vaca RN

## 2022-12-06 ENCOUNTER — TELEPHONE (OUTPATIENT)
Dept: GASTROENTEROLOGY | Facility: CLINIC | Age: 67
End: 2022-12-06

## 2022-12-06 NOTE — TELEPHONE ENCOUNTER
Screening Questions  BLUE  KIND OF PREP RED  LOCATION [review exclusion criteria] GREEN  SEDATION TYPE        Y Are you active on mychart?       [facility] Ordering/Referring Provider?        [employer] What type of coverage do you have?      No Have you had a positive covid test in the last 14 days?     No 1. BMI  [BMI 40+ - review exclusion criteria]    Y  2. Are you able to give consent for your medical care? [IF NO,RN REVIEW]        N  3. Are you taking any prescription pain medications on a routine schedule?      N  3a. EXTENDED PREP What kind of prescription?     N 4. Do you have any chemical dependencies such as alcohol, street drugs, or methadone?    N 5. Do you have any history of post-traumatic stress syndrome, severe anxiety or history of psychosis?      **If yes 3- 5 , please schedule with MAC sedation.**          IF YES TO ANY 6 - 10 - HOSPITAL SETTING ONLY.     N 6.   Do you need assistance transferring?     N 7.   Have you had a heart or lung transplant?    N 8.   Are you currently on dialysis?   N 9.   Do you use daily home oxygen?   N 10. Do you take nitroglycerin?   10a. N If yes, how often?     11. [FEMALES]  N Are you currently pregnant?    11a. N If yes, how many weeks? [ Greater than 12 weeks, OR NEEDED]    N 12. Do you have Pulmonary Hypertension? *NEED PAC APPT AT [facility]*     N 13. [review exclusion criteria]  Do you have any implantable devices in your body (pacemaker, defib, LVAD)?    N 14. In the past 6 months, have you had any heart related issues including cardiomyopathy or heart attack?     14a. N If yes, did it require cardiac stenting if so when?     N 15. Have you had a stroke or Transient ischemic attack (TIA - aka  mini stroke ) within 6 months?      N 16. Do you have mod to severe Obstructive Sleep Apnea?  [Hospital only - Ok at [facility]]    N 17. Do you have SEVERE AND UNCONTROLLED asthma? *NEED PAC APPT AT [facility]*     Yes 18. Are you currently taking any blood thinners?     18a.  "If yes, inform patient to \"follow up w/ ordering provider for bridging instructions.\"    N 19. Do you take the medication Phentermine?    19a. If yes, \"Hold for 7 days before procedure.  Please consult your prescribing provider if you have questions about holding this medication.\"     N  20. Do you have chronic kidney disease?      N  21. Do you have a diagnosis of diabetes?     N  22. On a regular basis do you go 3-5 days between bowel movements?     See below 23. Preferred LOCAL Pharmacy for Pre Prescription    [ LIST ONLY ONE PHARMACY]        [place], [place] - [address]        - CLOSING REMINDERS -    Informed patient they will need an adult    Cannot take any type of public or medical transportation alone    Conscious Sedation- Needs  for 6 hours after the procedure       MAC/General-Needs  for 24 hours after procedure    Pre-Procedure Covid test to be completed [[facility] PCR Testing Required]    Confirmed Nurse will call to complete assessment       - SCHEDULING DETAILS -     Olivia  Surgeon    3-7-23  Date of Procedure  Lower Endoscopy [Colonoscopy]  Type of Procedure Scheduled  [facility]-If you answer yes to questions #8, #20, #21Which Colonoscopy Prep was Sent?     GEN Sedation Type     NA PAC / Pre-op Required         Additional comments:  patient will be advised by prescribing doctor regarding plavis        "

## 2023-01-04 ENCOUNTER — OFFICE VISIT (OUTPATIENT)
Dept: FAMILY MEDICINE | Facility: CLINIC | Age: 68
End: 2023-01-04
Payer: COMMERCIAL

## 2023-01-04 VITALS
DIASTOLIC BLOOD PRESSURE: 78 MMHG | OXYGEN SATURATION: 98 % | HEIGHT: 65 IN | RESPIRATION RATE: 20 BRPM | SYSTOLIC BLOOD PRESSURE: 130 MMHG | HEART RATE: 63 BPM | WEIGHT: 164 LBS | TEMPERATURE: 97.5 F | BODY MASS INDEX: 27.32 KG/M2

## 2023-01-04 DIAGNOSIS — Z11.59 NEED FOR HEPATITIS C SCREENING TEST: ICD-10-CM

## 2023-01-04 DIAGNOSIS — Z12.11 COLON CANCER SCREENING: ICD-10-CM

## 2023-01-04 DIAGNOSIS — Z13.6 ENCOUNTER FOR ABDOMINAL AORTIC ANEURYSM SCREENING: ICD-10-CM

## 2023-01-04 DIAGNOSIS — I47.10 PAROXYSMAL SUPRAVENTRICULAR TACHYCARDIA (H): ICD-10-CM

## 2023-01-04 DIAGNOSIS — Z00.00 ENCOUNTER FOR MEDICARE ANNUAL WELLNESS EXAM: Primary | ICD-10-CM

## 2023-01-04 DIAGNOSIS — Z23 HIGH PRIORITY FOR 2019-NCOV VACCINE: ICD-10-CM

## 2023-01-04 DIAGNOSIS — I25.10 CORONARY ARTERY DISEASE INVOLVING NATIVE HEART WITHOUT ANGINA PECTORIS, UNSPECIFIED VESSEL OR LESION TYPE: ICD-10-CM

## 2023-01-04 DIAGNOSIS — Z23 NEED FOR PROPHYLACTIC VACCINATION AND INOCULATION AGAINST INFLUENZA: ICD-10-CM

## 2023-01-04 DIAGNOSIS — I25.10 CORONARY ARTERY DISEASE INVOLVING NATIVE CORONARY ARTERY OF NATIVE HEART WITHOUT ANGINA PECTORIS: ICD-10-CM

## 2023-01-04 PROBLEM — I21.3 STEMI (ST ELEVATION MYOCARDIAL INFARCTION) (H): Status: ACTIVE | Noted: 2019-09-17

## 2023-01-04 LAB — HCV AB SERPL QL IA: NONREACTIVE

## 2023-01-04 PROCEDURE — 86803 HEPATITIS C AB TEST: CPT | Performed by: PHYSICIAN ASSISTANT

## 2023-01-04 PROCEDURE — 90677 PCV20 VACCINE IM: CPT | Performed by: PHYSICIAN ASSISTANT

## 2023-01-04 PROCEDURE — G0439 PPPS, SUBSEQ VISIT: HCPCS | Performed by: PHYSICIAN ASSISTANT

## 2023-01-04 PROCEDURE — 90662 IIV NO PRSV INCREASED AG IM: CPT | Performed by: PHYSICIAN ASSISTANT

## 2023-01-04 PROCEDURE — G0008 ADMIN INFLUENZA VIRUS VAC: HCPCS | Performed by: PHYSICIAN ASSISTANT

## 2023-01-04 PROCEDURE — 91313 COVID-19 VACCINE BIVALENT BOOSTER 18+ (MODERNA): CPT | Performed by: PHYSICIAN ASSISTANT

## 2023-01-04 PROCEDURE — G0009 ADMIN PNEUMOCOCCAL VACCINE: HCPCS | Performed by: PHYSICIAN ASSISTANT

## 2023-01-04 PROCEDURE — 36415 COLL VENOUS BLD VENIPUNCTURE: CPT | Performed by: PHYSICIAN ASSISTANT

## 2023-01-04 PROCEDURE — 0134A COVID-19 VACCINE BIVALENT BOOSTER 18+ (MODERNA): CPT | Performed by: PHYSICIAN ASSISTANT

## 2023-01-04 ASSESSMENT — ENCOUNTER SYMPTOMS
COUGH: 0
EYE PAIN: 0
CHILLS: 0
JOINT SWELLING: 0
HEARTBURN: 0
MYALGIAS: 1
NAUSEA: 0
SHORTNESS OF BREATH: 0
DIARRHEA: 0
PARESTHESIAS: 0
HEMATOCHEZIA: 0
FREQUENCY: 0
WEAKNESS: 0
PALPITATIONS: 0
FEVER: 0
HEADACHES: 0
HEMATURIA: 0
ARTHRALGIAS: 0
DIZZINESS: 0
SORE THROAT: 0
NERVOUS/ANXIOUS: 0
CONSTIPATION: 0
DYSURIA: 0
ABDOMINAL PAIN: 0

## 2023-01-04 ASSESSMENT — PAIN SCALES - GENERAL: PAINLEVEL: NO PAIN (0)

## 2023-01-04 ASSESSMENT — ACTIVITIES OF DAILY LIVING (ADL): CURRENT_FUNCTION: NO ASSISTANCE NEEDED

## 2023-01-04 NOTE — PROGRESS NOTES
"SUBJECTIVE:   Jacques is a 67 year old who presents for Preventive Visit.  Patient has been advised of split billing requirements and indicates understanding: Yes  Are you in the first 12 months of your Medicare coverage?  No    Healthy Habits:     In general, how would you rate your overall health?  Good    Frequency of exercise:  2-3 days/week    Duration of exercise:  Less than 15 minutes    Do you usually eat at least 4 servings of fruit and vegetables a day, include whole grains    & fiber and avoid regularly eating high fat or \"junk\" foods?  No    Taking medications regularly:  Yes    Medication side effects:  None    Ability to successfully perform activities of daily living:  No assistance needed    Home Safety:  No safety concerns identified    Hearing Impairment:  Difficulty following a conversation in a noisy restaurant or crowded room and difficulty understanding soft or whispered speech    In the past 6 months, have you been bothered by leaking of urine?  No    In general, how would you rate your overall mental or emotional health?  Good      PHQ-2 Total Score: 0    Additional concerns today:  No    Cardiac history with ablation and 2 heart attacks, last 2019.  Just saw cardiology and is stable.  He is on both plavix and asprin.    Erratic schedule at [employer].    Doesn't plan meals much.  Not much veggies but does some better with fruit.    He lives alone.  He helps his elderly mom a lot.  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    Has colonoscopy scheduled for March.  Will be his 1st screen ever.    Recent basal cell and epididymitis.    Have you ever done Advance Care Planning? (For example, a Health Directive, POLST, or a discussion with a medical provider or your loved ones about your wishes): Yes, advance care planning is on file.    Fall risk  Fallen 2 or more times in the past year?: No  Any fall with injury in the past year?: No    Cognitive Screening   1) Repeat 3 items " (Leader, Season, Table)    2) Clock draw: NORMAL  3) 3 item recall: Recalls 2 objects   Results: NORMAL clock, 1-2 items recalled: COGNITIVE IMPAIRMENT LESS LIKELY    Mini-CogTM Copyright S [employer]. Licensed by the author for use in [facility]; reprinted with permission (seda@[facility]). All rights reserved.      Do you have sleep apnea, excessive snoring or daytime drowsiness?: does not sleep  well but never tested    Reviewed and updated as needed this visit by clinical staff   Tobacco  Allergies  Meds  Problems  Med Hx  Surg Hx  Fam Hx          Reviewed and updated as needed this visit by Provider   Tobacco  Allergies  Meds  Problems  Med Hx  Surg Hx  Fam Hx         Social History     Tobacco Use     Smoking status: Former     Smokeless tobacco: Never     Tobacco comments:     quit 30+ years ago   Substance Use Topics     Alcohol use: No     Alcohol Use 1/4/2023   Prescreen: >3 drinks/day or >7 drinks/week? Not Applicable   Prescreen: >3 drinks/day or >7 drinks/week? -     Current providers sharing in care for this patient include:   Patient Care Team:  Celia Lu PA-C as PCP - General (Family Medicine)  Kelsy Farmer APRN CNP as Assigned Heart and Vascular Provider  Matt Freeman MD as Assigned Surgical Provider    The following health maintenance items are reviewed in Epic and correct as of today:    Review of Systems   Constitutional: Negative for chills and fever.   HENT: Positive for hearing loss. Negative for congestion, ear pain and sore throat.    Eyes: Positive for visual disturbance. Negative for pain.   Respiratory: Negative for cough and shortness of breath.    Cardiovascular: Negative for chest pain, palpitations and peripheral edema.   Gastrointestinal: Negative for abdominal pain, constipation, diarrhea, heartburn, hematochezia and nausea.   Genitourinary: Negative for dysuria, frequency, genital sores, hematuria, impotence, penile  "discharge and urgency.   Musculoskeletal: Positive for myalgias. Negative for arthralgias and joint swelling.   Skin: Negative for rash.   Neurological: Negative for dizziness, weakness, headaches and paresthesias.   Psychiatric/Behavioral: Negative for mood changes. The patient is not nervous/anxious.      OBJECTIVE:   /78 (BP Location: Right arm)   Pulse 63   Temp 97.5  F (36.4  C) (Tympanic)   Resp 20   Ht 1.638 m (5' 4.5\")   Wt 74.4 kg (164 lb)   SpO2 98%   BMI 27.72 kg/m   Estimated body mass index is 27.72 kg/m  as calculated from the following:    Height as of this encounter: 1.638 m (5' 4.5\").    Weight as of this encounter: 74.4 kg (164 lb).  Physical Exam  GENERAL: healthy, alert and no distress  EYES: Eyes grossly normal to inspection, PERRL and conjunctivae and sclerae normal  HENT: ear canals and TM's normal, nose and mouth without ulcers or lesions  NECK: no adenopathy, no asymmetry, masses, or scars and thyroid normal to palpation  RESP: lungs clear to auscultation - no rales, rhonchi or wheezes  CV: regular rate and rhythm, normal S1 S2, no S3 or S4, no murmur, click or rub, no peripheral edema   MS: no gross musculoskeletal defects noted, no edema  SKIN: no suspicious lesions or rashes  NEURO: Normal strength and tone, mentation intact and speech normal  PSYCH: mentation appears normal, affect normal/bright    Diagnostic Test Results:  Labs reviewed in Epic    ASSESSMENT / PLAN:   (Z00.00) Encounter for Medicare annual wellness exam  (primary encounter diagnosis)    (I47.1) Paroxysmal supraventricular tachycardia (H)  Comment: stable    (I25.10) Coronary artery disease involving native heart without angina pectoris, unspecified vessel or lesion type  Comment: stable, will message cardiology to confirm their plan of dual plavix plus aspirin    (Z12.11) Colon cancer screening  Comment: scheduled for March    (Z13.6) Encounter for abdominal aortic aneurysm screening  Plan: Abdomincal " Aortic Aneurysm Screening/Tracking,         US Abdominal Aorta Imaging    (Z11.59) Need for hepatitis C screening test  Plan: Hepatitis C Screen Reflex to HCV RNA Quant and         Genotype    (Z23) Need for prophylactic vaccination and inoculation against influenza  Plan: INFLUENZA, QUAD, HIGH DOSE, PF, 65YR + (FLUZONE        HD)    (Z23) High priority for 2019-nCoV vaccine  Plan: COVID-19,PF,MODERNA BIVALENT 18+Yrs    Patient Instructions     Working on nutrition - fruits/veggies as half your plate, healthy oils (olive oil, avocados, fatty fish, nuts)  Making sure to get exercise - 30 min 5 days a week or equivalent (2.5 hrs/wk)    Aneurysm screen - Call [phone] to set up your imaging testing in [place]    Flu, covid, pneumonia shot today   Get tetanus shot sometime - see nurse with any small cut, etc  Talk to pharmacy about shingles vaccine    Patient Education   Personalized Prevention Plan  You are due for the preventive services outlined below.  Your care team is available to assist you in scheduling these services.  If you have already completed any of these items, please share that information with your care team to update in your medical record.  Health Maintenance Due   Topic Date Due     Colorectal Cancer Screening  Never done     Hepatitis C Screening  Never done     Zoster (Shingles) Vaccine (2 of 3) 07/22/2014     Pneumococcal Vaccine (2 - PCV) 08/31/2018     AORTIC ANEURYSM SCREENING (SYSTEM ASSIGNED)  Never done       Understanding USDA MyPlate  The USDA has guidelines to help you make healthy food choices. These are called MyPlate. MyPlate shows the food groups that make up healthy meals using the image of a place setting. Before you eat, think about the healthiest choices for what to put on your plate or in your cup or bowl. To learn more about building a healthy plate, visit www.choosemyplate.gov.    The food groups    Fruits. Any fruit or 100% fruit juice counts as part of the Fruit Group.  Fruits may be fresh, canned, frozen, or dried, and may be whole, cut-up, or pureed. Make 1/2 of your plate fruits and vegetables.    Vegetables. Any vegetable or 100% vegetable juice counts as a member of the Vegetable Group. Vegetables may be fresh, frozen, canned, or dried. They can be served raw or cooked and may be whole, cut-up, or mashed. Make 1/2 of your plate fruits and vegetables.    Grains. All foods made from grains are part of the Grains Group. These include wheat, rice, oats, cornmeal, and barley. Grains are often used to make foods such as bread, pasta, oatmeal, cereal, tortillas, and grits. Grains should be no more than 1/4 of your plate. At least half of your grains should be whole grains.    Protein. This group includes meat, poultry, seafood, beans and peas, eggs, processed soy products (such as tofu), nuts (including nut butters), and seeds. Make protein choices no more than 1/4 of your plate. Meat and poultry choices should be lean or low fat.    Dairy. The Dairy Group includes all fluid milk products and foods made from milk that contain calcium, such as yogurt and cheese. (Foods that have little calcium, such as cream, butter, and cream cheese, are not part of this group.) Most dairy choices should be low-fat or fat-free.    Oils. Oils aren't a food group, but they do contain essential nutrients. However it's important to watch your intake of oils. These are fats that are liquid at room temperature. They include canola, corn, olive, soybean, vegetable, and sunflower oil. Foods that are mainly oil include mayonnaise, certain salad dressings, and soft margarines. You likely already get your daily oil allowance from the foods you eat.  Things to limit  Eating healthy also means limiting these things in your diet:       Salt (sodium). Many processed foods have a lot of sodium. To keep sodium intake down, eat fresh vegetables, meats, poultry, and seafood when possible. Purchase low-sodium,  reduced-sodium, or no-salt-added food products at the store. And don't add salt to your meals at home. Instead, season them with herbs and spices such as dill, oregano, cumin, and paprika. Or try adding flavor with lemon or lime zest and juice.    Saturated fat. Saturated fats are most often found in animal products such as beef, pork, and chicken. They are often solid at room temperature, such as butter. To reduce your saturated fat intake, choose leaner cuts of meat and poultry. And try healthier cooking methods such as grilling, broiling, roasting, or baking. For a simple lower-fat swap, use plain nonfat yogurt instead of mayonnaise when making potato salad or macaroni salad.    Added sugars. These are sugars added to foods. They are in foods such as ice cream, candy, soda, fruit drinks, sports drinks, energy drinks, cookies, pastries, jams, and syrups. Cut down on added sugars by sharing sweet treats with a family member or friend. You can also choose fruit for dessert, and drink water or other unsweetened beverages.     [employer] last reviewed this educational content on 6/1/2020 2000-2021 [employer]. All rights reserved. This information is not intended as a substitute for professional medical care. Always follow your healthcare professional's instructions.          Signs of Hearing Loss      Hearing much better with one ear can be a sign of hearing loss.   Hearing loss is a problem shared by many people. In fact, it is one of the most common health problems, particularly as people age. Most people age 65 and older have some hearing loss. By age 80, almost everyone does. Hearing loss often occurs slowly over the years. So you may not realize your hearing has gotten worse.  Have your hearing checked  Call your healthcare provider if you:    Have to strain to hear normal conversation    Have to watch other people s faces very carefully to follow what they re saying    Need to ask people to  "repeat what they ve said    Often misunderstand what people are saying    Turn the volume of the television or radio up so high that others complain    Feel that people are mumbling when they re talking to you    Find that the effort to hear leaves you feeling tired and irritated    Notice, when using the phone, that you hear better with one ear than the other  StayWell last reviewed this educational content on 1/1/2020 2000-2021 [employer]. All rights reserved. This information is not intended as a substitute for professional medical care. Always follow your healthcare professional's instructions.             COUNSELING:  Reviewed preventive health counseling, as reflected in patient instructions      BMI:   Estimated body mass index is 27.72 kg/m  as calculated from the following:    Height as of this encounter: 1.638 m (5' 4.5\").    Weight as of this encounter: 74.4 kg (164 lb).     He reports that he has quit smoking. He has never used smokeless tobacco.    Appropriate preventive services were discussed with this patient, including applicable screening as appropriate for cardiovascular disease, diabetes, osteopenia/osteoporosis, and glaucoma.  As appropriate for age/gender, discussed screening for colorectal cancer, prostate cancer, breast cancer, and cervical cancer. Checklist reviewing preventive services available has been given to the patient.    Reviewed patients plan of care and provided an AVS. The Basic Care Plan (routine screening as documented in Health Maintenance) for Jacques meets the Care Plan requirement. This Care Plan has been established and reviewed with the Patient.    Celia Lu PA-C  [facility]    Identified Health Risks:    The patient was counseled and encouraged to consider modifying their diet and eating habits. He was provided with information on recommended healthy diet options.  The patient was provided with written information " regarding signs of hearing loss.

## 2023-01-17 ENCOUNTER — HOSPITAL ENCOUNTER (OUTPATIENT)
Dept: ULTRASOUND IMAGING | Facility: CLINIC | Age: 68
Discharge: HOME | End: 2023-01-17
Attending: PHYSICIAN ASSISTANT | Admitting: PHYSICIAN ASSISTANT
Payer: COMMERCIAL

## 2023-01-17 DIAGNOSIS — Z13.6 ENCOUNTER FOR ABDOMINAL AORTIC ANEURYSM SCREENING: ICD-10-CM

## 2023-01-17 PROCEDURE — 76775 US EXAM ABDO BACK WALL LIM: CPT

## 2023-01-18 NOTE — RESULT ENCOUNTER NOTE
Ross  Ultrasound shows no aneurysm (enlarged artery).  Normal result.  No further testing needed.  Celia

## 2023-02-28 RX ORDER — BISACODYL 5 MG
TABLET, DELAYED RELEASE (ENTERIC COATED) ORAL
Qty: 4 TABLET | Refills: 0 | Status: SHIPPED | OUTPATIENT
Start: 2023-02-28

## 2023-03-06 ENCOUNTER — ANESTHESIA EVENT (OUTPATIENT)
Dept: GASTROENTEROLOGY | Facility: CLINIC | Age: 68
End: 2023-03-06
Payer: COMMERCIAL

## 2023-03-06 NOTE — ANESTHESIA PREPROCEDURE EVALUATION
Anesthesia Pre-Procedure Evaluation    Patient: Jacques Sharp   MRN: [id number] : 1955        Procedure : Procedure(s):  COLONOSCOPY          Past Medical History:   Diagnosis Date     Acute MI (H) 2013     Basal cell carcinoma      Coronary artery disease      Epididymitis      Hyperlipidaemia      Hyperlipidaemia      Paroxysmal supraventricular tachycardia (H)       Past Surgical History:   Procedure Laterality Date     CORONARY ANGIOGRAPHY ADULT ORDER  14, 13     CV CORONARY ANGIOGRAM N/A 2019    Procedure: Coronary Angiogram;  Surgeon: Hoang Lozada MD;  Location:  [facility]     EP ABLATION / EP STUDIES  13     H ABLATION SVT  13     LUNG SURGERY      benign cyst removed.      VASECTOMY        Allergies   Allergen Reactions     Sulfa Drugs       Social History     Tobacco Use     Smoking status: Former     Smokeless tobacco: Never     Tobacco comments:     quit 30+ years ago   Substance Use Topics     Alcohol use: No      Wt Readings from Last 1 Encounters:   23 74.4 kg (164 lb)        Anesthesia Evaluation   Pt has had prior anesthetic. Type: General.        ROS/MED HX  ENT/Pulmonary:       Neurologic:       Cardiovascular:     (+) Dyslipidemia hypertension--CAD ---Irregular Heartbeat/Palpitations,     METS/Exercise Tolerance:     Hematologic:       Musculoskeletal:       GI/Hepatic:       Renal/Genitourinary:       Endo:       Psychiatric/Substance Use:       Infectious Disease:       Malignancy:       Other:            Physical Exam    Airway  airway exam normal      Mallampati: II   TM distance: > 3 FB   Neck ROM: full   Mouth opening: > 3 cm    Respiratory Devices and Support         Dental       (+) Minor Abnormalities - some fillings, tiny chips      Cardiovascular   cardiovascular exam normal          Pulmonary   pulmonary exam normal                OUTSIDE LABS:  CBC:   Lab Results   Component Value Date    WBC 5.4 2022    WBC 5.3  09/21/2020    HGB 13.0 (L) 09/09/2022    HGB 14.7 09/21/2020    HCT 39.0 (L) 09/09/2022    HCT 45.0 09/21/2020     09/09/2022     09/21/2020     BMP:   Lab Results   Component Value Date     09/09/2022     09/21/2020    POTASSIUM 4.1 09/09/2022    POTASSIUM 4.2 09/21/2020    CHLORIDE 103 09/09/2022    CHLORIDE 108 09/21/2020    CO2 26 09/09/2022    CO2 28 09/21/2020    BUN 11.7 09/09/2022    BUN 19 09/21/2020    CR 0.86 09/09/2022    CR 0.74 09/21/2020    GLC 96 09/09/2022    GLC 97 09/21/2020     COAGS:   Lab Results   Component Value Date    PTT 33 01/30/2014    INR 1.11 01/30/2014     POC:   Lab Results   Component Value Date     (H) 09/17/2019     HEPATIC:   Lab Results   Component Value Date    ALBUMIN 3.8 09/21/2020    PROTTOTAL 7.2 09/21/2020    ALT 38 09/21/2020    AST 26 09/21/2020    ALKPHOS 59 09/21/2020    BILITOTAL 0.7 09/21/2020     OTHER:   Lab Results   Component Value Date    A1C 5.9 (H) 09/18/2019    YRIS 8.8 09/09/2022    PHOS 4.2 09/17/2019    MAG 2.1 09/18/2019    TSH 1.34 09/21/2020    CRP <2.9 06/11/2015    SED 5 06/11/2015       Anesthesia Plan    ASA Status:  3   NPO Status:  NPO Appropriate    Anesthesia Type: General.   Induction: Propofol, Intravenous.   Maintenance: TIVA.        Consents    Anesthesia Plan(s) and associated risks, benefits, and realistic alternatives discussed. Questions answered and patient/representative(s) expressed understanding.     - Discussed: Risks, Benefits and Alternatives for BOTH SEDATION and the PROCEDURE were discussed     - Discussed with:  Patient         Postoperative Care    Pain management: Multi-modal analgesia, IV analgesics, Oral pain medications.   PONV prophylaxis: Ondansetron (or other 5HT-3), Dexamethasone or Solumedrol, Background Propofol Infusion     Comments:                JOSE JUAN Jhaveri CRNA

## 2023-03-07 ENCOUNTER — ANESTHESIA (OUTPATIENT)
Dept: GASTROENTEROLOGY | Facility: CLINIC | Age: 68
End: 2023-03-07
Payer: COMMERCIAL

## 2023-03-07 ENCOUNTER — HOSPITAL ENCOUNTER (OUTPATIENT)
Facility: CLINIC | Age: 68
Discharge: HOME | End: 2023-03-07
Attending: SURGERY | Admitting: SURGERY
Payer: COMMERCIAL

## 2023-03-07 VITALS
RESPIRATION RATE: 16 BRPM | TEMPERATURE: 97.6 F | BODY MASS INDEX: 27.32 KG/M2 | HEART RATE: 59 BPM | DIASTOLIC BLOOD PRESSURE: 74 MMHG | HEIGHT: 65 IN | SYSTOLIC BLOOD PRESSURE: 131 MMHG | WEIGHT: 164 LBS | OXYGEN SATURATION: 97 %

## 2023-03-07 DIAGNOSIS — Z12.11 SPECIAL SCREENING FOR MALIGNANT NEOPLASMS, COLON: Primary | ICD-10-CM

## 2023-03-07 LAB — COLONOSCOPY: NORMAL

## 2023-03-07 PROCEDURE — 250N000009 HC RX 250: Performed by: NURSE ANESTHETIST, CERTIFIED REGISTERED

## 2023-03-07 PROCEDURE — 250N000011 HC RX IP 250 OP 636: Performed by: NURSE ANESTHETIST, CERTIFIED REGISTERED

## 2023-03-07 PROCEDURE — 88305 TISSUE EXAM BY PATHOLOGIST: CPT | Mod: 26 | Performed by: PATHOLOGY

## 2023-03-07 PROCEDURE — 370N000017 HC ANESTHESIA TECHNICAL FEE, PER MIN: Performed by: SURGERY

## 2023-03-07 PROCEDURE — 258N000003 HC RX IP 258 OP 636: Performed by: NURSE ANESTHETIST, CERTIFIED REGISTERED

## 2023-03-07 PROCEDURE — 88305 TISSUE EXAM BY PATHOLOGIST: CPT | Mod: TC | Performed by: SURGERY

## 2023-03-07 PROCEDURE — 45385 COLONOSCOPY W/LESION REMOVAL: CPT | Performed by: SURGERY

## 2023-03-07 PROCEDURE — 45385 COLONOSCOPY W/LESION REMOVAL: CPT | Mod: PT | Performed by: SURGERY

## 2023-03-07 RX ORDER — LIDOCAINE 40 MG/G
CREAM TOPICAL
Status: DISCONTINUED | OUTPATIENT
Start: 2023-03-07 | End: 2023-03-07 | Stop reason: HOSPADM

## 2023-03-07 RX ORDER — SODIUM CHLORIDE, SODIUM LACTATE, POTASSIUM CHLORIDE, CALCIUM CHLORIDE 600; 310; 30; 20 MG/100ML; MG/100ML; MG/100ML; MG/100ML
INJECTION, SOLUTION INTRAVENOUS CONTINUOUS
Status: DISCONTINUED | OUTPATIENT
Start: 2023-03-07 | End: 2023-03-07 | Stop reason: HOSPADM

## 2023-03-07 RX ORDER — PROCHLORPERAZINE MALEATE 5 MG
5 TABLET ORAL EVERY 6 HOURS PRN
Status: DISCONTINUED | OUTPATIENT
Start: 2023-03-07 | End: 2023-03-07 | Stop reason: HOSPADM

## 2023-03-07 RX ORDER — PROPOFOL 10 MG/ML
INJECTION, EMULSION INTRAVENOUS CONTINUOUS PRN
Status: DISCONTINUED | OUTPATIENT
Start: 2023-03-07 | End: 2023-03-07

## 2023-03-07 RX ORDER — HYDROMORPHONE HCL IN WATER/PF 6 MG/30 ML
0.2 PATIENT CONTROLLED ANALGESIA SYRINGE INTRAVENOUS EVERY 5 MIN PRN
Status: DISCONTINUED | OUTPATIENT
Start: 2023-03-07 | End: 2023-03-07 | Stop reason: HOSPADM

## 2023-03-07 RX ORDER — HYDROMORPHONE HCL IN WATER/PF 6 MG/30 ML
0.4 PATIENT CONTROLLED ANALGESIA SYRINGE INTRAVENOUS EVERY 5 MIN PRN
Status: DISCONTINUED | OUTPATIENT
Start: 2023-03-07 | End: 2023-03-07 | Stop reason: HOSPADM

## 2023-03-07 RX ORDER — ONDANSETRON 2 MG/ML
4 INJECTION INTRAMUSCULAR; INTRAVENOUS EVERY 6 HOURS PRN
Status: DISCONTINUED | OUTPATIENT
Start: 2023-03-07 | End: 2023-03-07 | Stop reason: HOSPADM

## 2023-03-07 RX ORDER — NALOXONE HYDROCHLORIDE 0.4 MG/ML
0.4 INJECTION, SOLUTION INTRAMUSCULAR; INTRAVENOUS; SUBCUTANEOUS
Status: DISCONTINUED | OUTPATIENT
Start: 2023-03-07 | End: 2023-03-07 | Stop reason: HOSPADM

## 2023-03-07 RX ORDER — PROPOFOL 10 MG/ML
INJECTION, EMULSION INTRAVENOUS PRN
Status: DISCONTINUED | OUTPATIENT
Start: 2023-03-07 | End: 2023-03-07

## 2023-03-07 RX ORDER — NALOXONE HYDROCHLORIDE 0.4 MG/ML
0.2 INJECTION, SOLUTION INTRAMUSCULAR; INTRAVENOUS; SUBCUTANEOUS
Status: DISCONTINUED | OUTPATIENT
Start: 2023-03-07 | End: 2023-03-07 | Stop reason: HOSPADM

## 2023-03-07 RX ORDER — ONDANSETRON 4 MG/1
4 TABLET, ORALLY DISINTEGRATING ORAL EVERY 6 HOURS PRN
Status: DISCONTINUED | OUTPATIENT
Start: 2023-03-07 | End: 2023-03-07 | Stop reason: HOSPADM

## 2023-03-07 RX ORDER — FENTANYL CITRATE 50 UG/ML
50 INJECTION, SOLUTION INTRAMUSCULAR; INTRAVENOUS EVERY 5 MIN PRN
Status: DISCONTINUED | OUTPATIENT
Start: 2023-03-07 | End: 2023-03-07 | Stop reason: HOSPADM

## 2023-03-07 RX ORDER — ONDANSETRON 2 MG/ML
4 INJECTION INTRAMUSCULAR; INTRAVENOUS EVERY 30 MIN PRN
Status: DISCONTINUED | OUTPATIENT
Start: 2023-03-07 | End: 2023-03-07 | Stop reason: HOSPADM

## 2023-03-07 RX ORDER — LIDOCAINE HYDROCHLORIDE 10 MG/ML
INJECTION, SOLUTION INFILTRATION; PERINEURAL PRN
Status: DISCONTINUED | OUTPATIENT
Start: 2023-03-07 | End: 2023-03-07

## 2023-03-07 RX ORDER — ONDANSETRON 4 MG/1
4 TABLET, ORALLY DISINTEGRATING ORAL EVERY 30 MIN PRN
Status: DISCONTINUED | OUTPATIENT
Start: 2023-03-07 | End: 2023-03-07 | Stop reason: HOSPADM

## 2023-03-07 RX ORDER — FENTANYL CITRATE 50 UG/ML
25 INJECTION, SOLUTION INTRAMUSCULAR; INTRAVENOUS EVERY 5 MIN PRN
Status: DISCONTINUED | OUTPATIENT
Start: 2023-03-07 | End: 2023-03-07 | Stop reason: HOSPADM

## 2023-03-07 RX ORDER — FLUMAZENIL 0.1 MG/ML
0.2 INJECTION, SOLUTION INTRAVENOUS
Status: DISCONTINUED | OUTPATIENT
Start: 2023-03-07 | End: 2023-03-07 | Stop reason: HOSPADM

## 2023-03-07 RX ADMIN — LIDOCAINE HYDROCHLORIDE 100 MG: 10 INJECTION, SOLUTION INFILTRATION; PERINEURAL at 09:10

## 2023-03-07 RX ADMIN — PROPOFOL 150 MCG/KG/MIN: 10 INJECTION, EMULSION INTRAVENOUS at 09:10

## 2023-03-07 RX ADMIN — PROPOFOL 60 MG: 10 INJECTION, EMULSION INTRAVENOUS at 09:10

## 2023-03-07 RX ADMIN — SODIUM CHLORIDE, POTASSIUM CHLORIDE, SODIUM LACTATE AND CALCIUM CHLORIDE: 600; 310; 30; 20 INJECTION, SOLUTION INTRAVENOUS at 08:22

## 2023-03-07 RX ADMIN — LIDOCAINE HYDROCHLORIDE 0.1 ML: 10 INJECTION, SOLUTION EPIDURAL; INFILTRATION; INTRACAUDAL; PERINEURAL at 08:22

## 2023-03-07 ASSESSMENT — ACTIVITIES OF DAILY LIVING (ADL): ADLS_ACUITY_SCORE: 35

## 2023-03-07 NOTE — LETTER
[facility]           [address] [place] [address]           Tel [phone]  Jacques Sharp  [address]  [place] [address]      March 9, 2023    Dear Jacques,  This letter is to inform you of the results of your pathology report on your colonoscopy.  If you have questions please feel free to call:  Please call [phone], for  [facility] or  for [facility], to schedule a follow up appointment in 2 weeks.   Your pathology report was:  Normal, please follow up by having a repeat colonoscopy in 10 years.  If you do have further questions please don t hesitate to call the below number.    To make an appointment call:  Please call [phone], for  [facility] or  for [facility], to schedule a follow up appointment in 2 weeks.     Sincerely,     Jose Baker M.D.  ___

## 2023-03-07 NOTE — H&P
ENDOSCOPY PRE-SEDATION H&P FOR OUTPATIENT PROCEDURES    Jacques Sharp  [id number]  1955    Procedure:   Colonoscopy possible biopsy, possible polypectomy, with MAC sedation.     Pre-procedure diagnosis: screen    Past medical history:   Past Medical History:   Diagnosis Date     Acute MI (H) 12/24/2013     Basal cell carcinoma      Coronary artery disease      Epididymitis      Hyperlipidaemia      Hyperlipidaemia      Paroxysmal supraventricular tachycardia (H)        Past surgical history:   Past Surgical History:   Procedure Laterality Date     CORONARY ANGIOGRAPHY ADULT ORDER  1/30/14, 12/24/13     CV CORONARY ANGIOGRAM N/A 9/17/2019    Procedure: Coronary Angiogram;  Surgeon: Hoang Lozada MD;  Location:  [facility]ATH LAB     EP ABLATION / EP STUDIES  12/23/13     H ABLATION SVT  12/23/13     LUNG SURGERY      benign cyst removed.      VASECTOMY         Current Facility-Administered Medications   Medication     lactated ringers infusion     lidocaine (LMX4) kit     lidocaine (LMX4) kit     lidocaine 1 % 0.1-1 mL     lidocaine 1 % 0.1-1 mL     sodium chloride (PF) 0.9% PF flush 3 mL     sodium chloride (PF) 0.9% PF flush 3 mL     sodium chloride (PF) 0.9% PF flush 3 mL     sodium chloride (PF) 0.9% PF flush 3 mL       Allergies   Allergen Reactions     Sulfa Drugs        History of Anesthesia/Sedation Problems: no    Physical Exam:    Mental status: alert  Heart: Normal  Lung: Normal  Assessment of patient's airway: Normal  Other as pertinent for procedure: None     ASA Score: See Provation note    Mallampati score:  I - Faucial pillars, soft palate, and uvula are visible    Assessment/Plan:     The patient is an appropriate candidate to receive sedation.    Informed consent was discussed with the patient/family, including the risks, benefits, potential complications and any alternative options associated with sedation.    Patient assessment completed just prior to sedation and while under  constant observation by the provider. Condition determined to be adequate for proceeding with sedation.    The specific risks for the procedure were discussed with the patient at the time of informed consent and include but are not limited to perforation which could require surgery, missing significant neoplasm or lesion, hemorrhage and adverse sedative complication.      Jose Baker MD

## 2023-03-07 NOTE — ANESTHESIA POSTPROCEDURE EVALUATION
Patient: Jacques Sharp    Procedure: Procedure(s):  COLONOSCOPY, FLEXIBLE, WITH LESION REMOVAL USING SNARE       Anesthesia Type:  General    Note:  Disposition: Outpatient   Postop Pain Control: Uneventful            Sign Out: Well controlled pain   PONV: No   Neuro/Psych: Uneventful            Sign Out: Acceptable/Baseline neuro status   Airway/Respiratory: Uneventful            Sign Out: Acceptable/Baseline resp. status   CV/Hemodynamics: Uneventful            Sign Out: Acceptable CV status; No obvious hypovolemia; No obvious fluid overload   Other NRE: NONE   DID A NON-ROUTINE EVENT OCCUR? No           Last vitals:  Vitals:    03/07/23 0746   BP: 131/75   Pulse: 71   Resp: 16   Temp: 36.8  C (98.2  F)   SpO2: 97%       Electronically Signed By: JOSE JUAN Osuna CRNA  March 7, 2023  9:28 AM

## 2023-03-07 NOTE — ANESTHESIA CARE TRANSFER NOTE
Patient: Jacques Sharp    Procedure: Procedure(s):  COLONOSCOPY, FLEXIBLE, WITH LESION REMOVAL USING SNARE       Diagnosis: Colon cancer screening [Z12.11]  Diagnosis Additional Information: No value filed.    Anesthesia Type:   General     Note:    Oropharynx: oropharynx clear of all foreign objects and spontaneously breathing  Level of Consciousness: awake  Oxygen Supplementation: room air    Independent Airway: airway patency satisfactory and stable  Dentition: dentition unchanged  Vital Signs Stable: post-procedure vital signs reviewed and stable  Report to RN Given: handoff report given  Patient transferred to: Phase II    Handoff Report: Identifed the Patient, Identified the Reponsible Provider, Reviewed the pertinent medical history, Discussed the surgical course, Reviewed Intra-OP anesthesia mangement and issues during anesthesia, Set expectations for post-procedure period and Allowed opportunity for questions and acknowledgement of understanding      Vitals:  Vitals Value Taken Time   BP     Temp     Pulse     Resp     SpO2         Electronically Signed By: JOSE JUAN Osuna CRNA  March 7, 2023  9:28 AM

## 2023-03-08 LAB
PATH REPORT.COMMENTS IMP SPEC: NORMAL
PATH REPORT.COMMENTS IMP SPEC: NORMAL
PATH REPORT.FINAL DX SPEC: NORMAL
PATH REPORT.GROSS SPEC: NORMAL
PATH REPORT.MICROSCOPIC SPEC OTHER STN: NORMAL
PATH REPORT.RELEVANT HX SPEC: NORMAL
PHOTO IMAGE: NORMAL

## 2023-07-26 DIAGNOSIS — I21.3 ST ELEVATION MYOCARDIAL INFARCTION (STEMI), UNSPECIFIED ARTERY (H): ICD-10-CM

## 2023-07-26 RX ORDER — NITROGLYCERIN 0.4 MG/1
0.4 TABLET SUBLINGUAL EVERY 5 MIN PRN
Qty: 60 TABLET | Refills: 0 | Status: SHIPPED | OUTPATIENT
Start: 2023-07-26

## 2023-07-26 NOTE — TELEPHONE ENCOUNTER
[facility] Cardiology Refill Guideline reviewed.  Medication meets criteria for refill.    Tiffanie Cramer RN

## 2023-09-23 NOTE — PATIENT INSTRUCTIONS
Wound Care Instructions     FOR SUPERFICIAL WOUNDS     [facility] [phone]    [facility] [phone]                       AFTER 24 HOURS YOU SHOULD REMOVE THE BANDAGE AND BEGIN DAILY DRESSING CHANGES AS FOLLOWS:     1) Remove Dressing.     2) Clean and dry the area with tap water using a Q-tip or sterile gauze pad.     3) Apply Vaseline, Aquaphor, Polysporin ointment or Bacitracin ointment over entire wound.  Do NOT use Neosporin ointment.     4) Cover the wound with a band-aid, or a sterile non-stick gauze pad and micropore paper tape      REPEAT THESE INSTRUCTIONS AT LEAST ONCE A DAY UNTIL THE WOUND HAS COMPLETELY HEALED.    It is an old wives tale that a wound heals better when it is exposed to air and allowed to dry out. The wound will heal faster with a better cosmetic result if it is kept moist with ointment and covered with a bandage.    **Do not let the wound dry out.**      Supplies Needed:      *Cotton tipped applicators (Q-tips)    *Polysporin Ointment or Bacitracin Ointment (NOT NEOSPORIN)    *Band-aids or non-stick gauze pads and micropore paper tape.      PATIENT INFORMATION:    During the healing process you will notice a number of changes. All wounds develop a small halo of redness surrounding the wound.  This means healing is occurring. Severe itching with extensive redness usually indicates sensitivity to the ointment or bandage tape used to dress the wound.  You should call our office if this develops.      Swelling  and/or discoloration around your surgical site is common, particularly when performed around the eye.    All wounds normally drain.  The larger the wound the more drainage there will be.  After 7-10 days, you will notice the wound beginning to shrink and new skin will begin to grow.  The wound is healed when you can see skin has formed over the entire area.  A healed wound has a healthy, shiny look to the surface and is red to dark pink in color  Thank you for allowing us to care for you at [facility] Emergency Room today. Thank you for your patience.     You have been seen and evaluated. We reviewed the results pertinent to your visit in the emergency department today. Please read the instructions provided. If any prescriptions were sent for you, please fill it from your pharmacy and take them as instructed.     Remember, your care process does not end after your visit today. Please follow-up with your doctor as advised for a follow-up check to ensure you are  improving, to see if you need any further evaluation/testing, or to evaluate for any alternate diagnoses.     Please return to the emergency department if you develop any worsening or other new or concerning symptoms as these could be signs of more serious medical illness.    Take muscle relaxants if needed. Apply warm compresses for comfort.     Take tylenol 500 mg every 6 hours if needed for pain control.    to normalize.  Wounds may take approximately 4-6 weeks to heal.  Larger wounds may take 6-8 weeks.  After the wound is healed you may discontinue dressing changes.    You may experience a sensation of tightness as your wound heals. This is normal and will gradually subside.    Your healed wound may be sensitive to temperature changes. This sensitivity improves with time, but if you re having a lot of discomfort, try to avoid temperature extremes.    Patients frequently experience itching after their wound appears to have healed because of the continue healing under the skin.  Plain Vaseline will help relieve the itching.        POSSIBLE COMPLICATIONS    BLEEDIN. Leave the bandage in place.  2. Use tightly rolled up gauze or a cloth to apply direct pressure over the bandage for 30  minutes.  3. Reapply pressure for an additional 30 minutes if necessary  4. Use additional gauze and tape to maintain pressure once the bleeding has stopped.  5.

## 2023-10-31 DIAGNOSIS — I49.3 PVC'S (PREMATURE VENTRICULAR CONTRACTIONS): ICD-10-CM

## 2023-10-31 DIAGNOSIS — I47.10 PAROXYSMAL SUPRAVENTRICULAR TACHYCARDIA (H): ICD-10-CM

## 2023-10-31 DIAGNOSIS — E78.5 HYPERLIPIDEMIA LDL GOAL <70: ICD-10-CM

## 2023-10-31 RX ORDER — METOPROLOL SUCCINATE 50 MG/1
50 TABLET, EXTENDED RELEASE ORAL DAILY
Qty: 90 TABLET | Refills: 0 | Status: SHIPPED | OUTPATIENT
Start: 2023-10-31 | End: 2024-02-26

## 2023-10-31 RX ORDER — CLOPIDOGREL BISULFATE 75 MG/1
75 TABLET ORAL DAILY
Qty: 90 TABLET | Refills: 0 | Status: SHIPPED | OUTPATIENT
Start: 2023-10-31 | End: 2024-02-26

## 2023-10-31 RX ORDER — ATORVASTATIN CALCIUM 80 MG/1
80 TABLET, FILM COATED ORAL DAILY
Qty: 90 TABLET | Refills: 0 | Status: SHIPPED | OUTPATIENT
Start: 2023-10-31 | End: 2024-02-26

## 2023-10-31 NOTE — TELEPHONE ENCOUNTER
No further refills until seen by cardiology--call [phone] to schedule    [facility] refill [address] Cardiology Refill Guideline reviewed.  Medication meets criteria for refill.    Tiffanie Cramer RN

## 2023-12-05 ENCOUNTER — PATIENT OUTREACH (OUTPATIENT)
Dept: CARE COORDINATION | Facility: CLINIC | Age: 68
End: 2023-12-05
Payer: COMMERCIAL

## 2023-12-19 ENCOUNTER — PATIENT OUTREACH (OUTPATIENT)
Dept: CARE COORDINATION | Facility: CLINIC | Age: 68
End: 2023-12-19
Payer: COMMERCIAL

## 2024-02-26 DIAGNOSIS — I49.3 PVC'S (PREMATURE VENTRICULAR CONTRACTIONS): ICD-10-CM

## 2024-02-26 DIAGNOSIS — E78.5 HYPERLIPIDEMIA LDL GOAL <70: ICD-10-CM

## 2024-02-26 DIAGNOSIS — I47.10 PAROXYSMAL SUPRAVENTRICULAR TACHYCARDIA (H): ICD-10-CM

## 2024-02-27 RX ORDER — METOPROLOL SUCCINATE 50 MG/1
50 TABLET, EXTENDED RELEASE ORAL DAILY
Qty: 90 TABLET | Refills: 0 | Status: SHIPPED | OUTPATIENT
Start: 2024-02-27 | End: 2024-05-28

## 2024-02-27 RX ORDER — ATORVASTATIN CALCIUM 80 MG/1
80 TABLET, FILM COATED ORAL DAILY
Qty: 90 TABLET | Refills: 0 | Status: SHIPPED | OUTPATIENT
Start: 2024-02-27 | End: 2024-05-28

## 2024-02-27 RX ORDER — CLOPIDOGREL BISULFATE 75 MG/1
75 TABLET ORAL DAILY
Qty: 90 TABLET | Refills: 0 | Status: SHIPPED | OUTPATIENT
Start: 2024-02-27 | End: 2024-05-28

## 2024-02-27 NOTE — TELEPHONE ENCOUNTER
No scheduled for next visit 5/28/24.    Refill given to get pt to next visit     No further refills until seen by cardiology--call [phone] to schedule    [facility] Cardiology Refill Guideline reviewed.  Medication meets criteria for refill.    Tiffanie Cramer RN

## 2024-02-27 NOTE — TELEPHONE ENCOUNTER
Over due for yearly visit. Message sent to scheduling to schedule.   [facility] refills already given     Tiffanie Cramer RN

## 2024-04-07 ENCOUNTER — HEALTH MAINTENANCE LETTER (OUTPATIENT)
Age: 69
End: 2024-04-07

## 2024-05-04 ENCOUNTER — HOSPITAL ENCOUNTER (EMERGENCY)
Facility: CLINIC | Age: 69
Discharge: HOME | End: 2024-05-04
Attending: EMERGENCY MEDICINE | Admitting: EMERGENCY MEDICINE
Payer: COMMERCIAL

## 2024-05-04 VITALS
WEIGHT: 162 LBS | RESPIRATION RATE: 14 BRPM | HEART RATE: 53 BPM | OXYGEN SATURATION: 98 % | SYSTOLIC BLOOD PRESSURE: 145 MMHG | DIASTOLIC BLOOD PRESSURE: 76 MMHG | TEMPERATURE: 97 F | BODY MASS INDEX: 27.38 KG/M2
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
## 2024-05-04 DIAGNOSIS — S20.461A TICK BITE OF RIGHT BACK WALL OF THORAX, INITIAL ENCOUNTER: ICD-10-CM

## 2024-05-04 PROCEDURE — 99283 EMERGENCY DEPT VISIT LOW MDM: CPT | Performed by: EMERGENCY MEDICINE

## 2024-05-04 PROCEDURE — 250N000013 HC RX MED GY IP 250 OP 250 PS 637: Performed by: EMERGENCY MEDICINE

## 2024-05-04 RX ORDER — DOXYCYCLINE 100 MG/1
200 CAPSULE ORAL ONCE
Status: COMPLETED | OUTPATIENT
Start: 2024-05-04 | End: 2024-05-04

## 2024-05-04 RX ADMIN — DOXYCYCLINE HYCLATE 200 MG: 100 CAPSULE ORAL at 19:27

## 2024-05-04 ASSESSMENT — COLUMBIA-SUICIDE SEVERITY RATING SCALE - C-SSRS
6. HAVE YOU EVER DONE ANYTHING, STARTED TO DO ANYTHING, OR PREPARED TO DO ANYTHING TO END YOUR LIFE?: NO
2. HAVE YOU ACTUALLY HAD ANY THOUGHTS OF KILLING YOURSELF IN THE PAST MONTH?: NO
1. IN THE PAST MONTH, HAVE YOU WISHED YOU WERE DEAD OR WISHED YOU COULD GO TO SLEEP AND NOT WAKE UP?: NO

## 2024-05-04 ASSESSMENT — ACTIVITIES OF DAILY LIVING (ADL): ADLS_ACUITY_SCORE: 33

## 2024-05-04 NOTE — ED TRIAGE NOTES
Wood tick concerns behind right shoulder.      Triage Assessment (Adult)       Row Name 05/04/24 6426          Triage Assessment    Airway WDL WDL        Respiratory WDL    Respiratory WDL WDL        Skin Circulation/Temperature WDL    Skin Circulation/Temperature WDL WDL        Cardiac WDL    Cardiac WDL WDL        Peripheral/Neurovascular WDL    Peripheral Neurovascular WDL WDL        Cognitive/Neuro/Behavioral WDL    Cognitive/Neuro/Behavioral WDL WDL

## 2024-05-05 NOTE — DISCHARGE INSTRUCTIONS
Return if symptoms worsen or new symptoms develop.  Follow-up with primary care physician next available.  Drink plenty of fluids.  Wash wound with soap and water then dry apply bacitracin ointment.  Monitor for any erythema edema present.  If increased redness swelling fevers or other symptoms present please return for further evaluation and care.

## 2024-05-06 NOTE — ED PROVIDER NOTES
History     Chief Complaint   Patient presents with    Tick Bite     Wood tick concerns behind right shoulder.      HPI  Jacques Sharp is a 69 year old male who presents to the emergency department complaining of tick present on right scapular region.  Patient states he has been outdoors and when getting ready for bed tonight noticed possible tick on his back.  Unable to get out of so came in for further evaluation and care.  Is a small tick no other areas noted.  Patient denies illness.  He is not any fevers or chills has not had headache denies focal numbness weakness in extremity nausea vomiting abdominal pain.  Nursing staff and removed tick with tweezers prior to me examining patient.    Allergies:  Allergies   Allergen Reactions    Sulfa Antibiotics        Problem List:    Patient Active Problem List    Diagnosis Date Noted    Coronary artery disease involving native coronary artery of native heart without angina pectoris 09/17/2019     Priority: Medium     In December 2013 he underwent ablation for AV node reentry tachycardia.  He had a postop inferior STEMI with subsequent drug-eluting stent to the RCA and then staged intervention of the OM 2, with another stent placed.  He was also noted to have atrial tachycardia originating near the his node and no ablation was pursued.  He has been on beta blocker therapy since then.        September 2019 he presented to [facility] with inferior STEMI, troponin 47.  Angiogram showed 100% mid RCA lesion, 80% RPDA, 60% third RPL, 45% proximal to mid LAD, 25% proximal circumflex.  This was a late stent thrombosis of the distal RCA, repeat drug-eluting stents placed.  Echo showed EF 55%, inferior and inferolateral akinesis, normal RV, no valve disease.  Per clarification with cardiology 1/4/23 - He had a late stent thrombosis, plan was to keep him on aspirin and clopidogrel indefinitely.          Paroxysmal supraventricular tachycardia (H24) 02/19/2014     Priority: Medium      12/26/13:SVT RFA performed. Two foci were noted during his EP study, however only one was ablated as the second site was very close to the HIS bundle.  Complicated by MI after the procedure.         Hyperlipidemia LDL goal <70 02/26/2013     Priority: Medium        Past Medical History:    Past Medical History:   Diagnosis Date    Acute MI (H) 12/24/2013    Basal cell carcinoma     Coronary artery disease     Epididymitis     Hyperlipidaemia     Hyperlipidaemia     Paroxysmal supraventricular tachycardia (H)        Past Surgical History:    Past Surgical History:   Procedure Laterality Date    COLONOSCOPY N/A 3/7/2023    Procedure: COLONOSCOPY, FLEXIBLE, WITH LESION REMOVAL USING SNARE;  Surgeon: Jose Baker MD;  Location: [facility]    CORONARY ANGIOGRAPHY ADULT ORDER  1/30/14, 12/24/13    CV CORONARY ANGIOGRAM N/A 9/17/2019    Procedure: Coronary Angiogram;  Surgeon: Hoang Lozada MD;  Location:  HEART CARDIAC CATH LAB    EP ABLATION / EP STUDIES  12/23/13    H ABLATION SVT  12/23/13    LUNG SURGERY      benign cyst removed.     VASECTOMY         Family History:    Family History   Problem Relation Age of Onset    Hypertension Mother     Cancer Father         lung    Cancer - colorectal No family hx of     Prostate Cancer No family hx of        Social History:  Marital Status:  Single [1]  Social History     Tobacco Use    Smoking status: Former    Smokeless tobacco: Never    Tobacco comments:     quit 30+ years ago   Substance Use Topics    Alcohol use: No    Drug use: No        Medications:    acetaminophen (TYLENOL) 325 MG tablet  aspirin (ASA) 81 MG chewable tablet  atorvastatin (LIPITOR) 80 MG tablet  bisacodyl (DULCOLAX) 5 MG EC tablet  clopidogrel (PLAVIX) 75 MG tablet  metoprolol succinate ER (TOPROL XL) 50 MG 24 hr tablet  nitroGLYcerin (NITROSTAT) 0.4 MG sublingual tablet  polyethylene glycol (GOLYTELY) 236 g suspension  sildenafil (REVATIO) 20 MG tablet          Review of Systems  As per  HPI.  Physical Exam   BP: (!) 200/72  Pulse: 53  Temp: 97  F (36.1  C)  Resp: 14  Weight: 73.5 kg (162 lb)  SpO2: 98 %      Physical Exam  Vitals and nursing note reviewed.   Constitutional:       General: He is not in acute distress.     Appearance: Normal appearance. He is not ill-appearing, toxic-appearing or diaphoretic.   HENT:      Head: Normocephalic and atraumatic.   Eyes:      Conjunctiva/sclera: Conjunctivae normal.   Pulmonary:      Effort: Pulmonary effort is normal.   Musculoskeletal:      Cervical back: Normal range of motion.      Comments: There is a area of mild redness roughly 5 mm in size on the patient's right scapular region.  There is no evidence of foreign body in this area.  I examined this under magnifying lenses.   Skin:     General: Skin is warm and dry.   Neurological:      General: No focal deficit present.      Mental Status: He is alert.      Motor: No weakness.      Coordination: Coordination normal.   Psychiatric:         Mood and Affect: Mood normal.         ED Course        Procedures              Critical Care time:  none               No results found for this or any previous visit (from the past 24 hour(s)).    Medications   doxycycline hyclate (VIBRAMYCIN) capsule 200 mg (200 mg Oral $Given 5/4/24 1927)       Assessments & Plan (with Medical Decision Making) records were reviewed including past medical history medications and allergies.  No foreign body noted at site of tick bite tick is examined and does appear to be possible deer tick.  We do not know how long the tick has been on the patient and risks and benefits of prophylactic Lyme disease treatment was discussed with the patient he was agreement taking it.  Doxycycline 20 mg given to the patient.  If increased redness swelling drainage target lesion fevers not controlled ibuprofen Tylenol or other symptoms present patient should return for recheck.  He feels comfortable this plan.     I have reviewed the nursing  notes.    I have reviewed the findings, diagnosis, plan and need for follow up with the patient.         Discharge Medication List as of 5/4/2024  7:24 PM          Final diagnoses:   Tick bite of right back wall of thorax, initial encounter       5/4/2024   [facility] EMERGENCY DEPT       Talon Veloz MD  05/06/24 0931

## 2024-05-21 NOTE — PROGRESS NOTES
CARDIOLOGY VISIT    REASON FOR VISIT: SVT, CAD    SUBJECTIVE:  69-year-old male seen for follow-up of SVT and coronary artery disease.      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  He had a postop inferior STEMI with subsequent drug-eluting stent to the RCA and then staged intervention of the OM 2.  He was also noted to have atrial tachycardia originating near the his node and no ablation was pursued.  He has been on beta blocker therapy since then.           Echo 2014 showed ejection fraction 50-55% with mild inferior and inferolateral hypokinesis. Nuclear stress 2015 small fixed inferior wall defect consistent with infarct, no ischemia, ejection fraction 51% with mild inferior hypokinesis.        Angiogram 2019 showed 100% mid RCA lesion, 80% RPDA, 60% third RPL, 45% proximal to mid LAD, 25% proximal circumflex.  This was a late stent thrombosis of the distal RCA, repeat drug-eluting stents placed.  Echo showed EF 55%, inferior and inferolateral akinesis, normal RV, no valve disease.     Holter monitor 2019 showed sinus rhythm, 4% PVCs.    He has been doing well recently.  He will use the elliptical every other day and denies any shortness of breath or chest pain.  Blood pressure has not been checked at home recently.  He denies lightheadedness, dizziness, palpitations, or edema.    MEDICATIONS:  Current Outpatient Medications   Medication Sig Dispense Refill    acetaminophen (TYLENOL) 325 MG tablet Take 1-2 tablets (325-650 mg) by mouth every 4 hours as needed 100 tablet     aspirin (ASA) 81 MG chewable tablet CHEW AND SWALLOW ONE TABLET BY MOUTH ONCE DAILY 90 tablet 3    atorvastatin (LIPITOR) 80 MG tablet Take 1 tablet (80 mg) by mouth daily No further refills until seen by cardiology--call [phone] to schedule 90 tablet 0    bisacodyl (DULCOLAX) 5 MG EC tablet Take 2 tablets at 3 pm the day before your procedure. If your procedure is before 11 am, take 2 additional tablets at 11 pm. If  your procedure is after 11 am, take 2 additional tablets at 6 am. For additional instructions refer to your colonoscopy prep instructions. 4 tablet 0    clopidogrel (PLAVIX) 75 MG tablet Take 1 tablet (75 mg) by mouth daily No further refills until seen by cardiology--call [phone] to schedule 90 tablet 0    metoprolol succinate ER (TOPROL XL) 50 MG 24 hr tablet Take 1 tablet (50 mg) by mouth daily No further refills until seen by cardiology--call [phone] to schedule 90 tablet 0    nitroGLYcerin (NITROSTAT) 0.4 MG sublingual tablet Place 1 tablet (0.4 mg) under the tongue every 5 minutes as needed for chest pain DO NOT USE WITHIN 24 HOURS OF SILDENAFIL 60 tablet 0    polyethylene glycol (GOLYTELY) 236 g suspension The night before the exam at 6 pm drink an 8-ounce glass every 15 minutes until the jug is half empty. If you arrive before 11 AM: Drink the other half of the SI-BONEly jug at 11 PM night before procedure. If you arrive after 11 AM: Drink the other half of the Golytely jug at 6 AM day of procedure. For additional instructions refer to your colonoscopy prep instructions. 4000 mL 0    sildenafil (REVATIO) 20 MG tablet Take 1 tablet (20 mg) by mouth daily as needed (erectile dysfunction) DO NOT USE WITHIN 24 HOURS OF NITROGLYCERIN (Patient not taking: Reported on 1/4/2023) 25 tablet 3     No current facility-administered medications for this visit.       ALLERGIES:  Allergies   Allergen Reactions    Sulfa Antibiotics      REVIEW OF SYSTEMS:  Constitutional:  No weight loss, fever, chills  HEENT:  Eyes:  No visual loss, blurred vision, double vision or yellow sclerae. No hearing loss, sneezing, congestion, runny nose or sore throat.  Skin:  No rash or itching.  Cardiovascular: per HPI  Respiratory: per HPI  GI:  No anorexia, nausea, vomiting or diarrhea. No abdominal pain or blood.  :  No dysurea, hematuria  Neurologic:  No headache, paralysis, ataxia, numbness or tingling in the extremities. No  "change in bowel or bladder control.  Musculoskeletal:  No muscle pain  Hematologic:  No bleeding or bruising.  Lymphatics:  No enlarged nodes. No history of splenectomy.  Endocrine:  No reports of sweating, cold or heat intolerance. No polyuria or polydipsia.  Allergies:  No history of asthma, hives, eczema or rhinitis.    PHYSICAL EXAM:  /80 (BP Location: Left arm, Patient Position: Sitting, Cuff Size: Adult Regular)   Pulse 50   Resp 16   Ht 1.638 m (5' 4.5\")   Wt 76.4 kg (168 lb 6.4 oz)   SpO2 99%   BMI 28.46 kg/m    Constitutional: awake, alert, no distress  Eyes: PERRL, sclera nonicteric  ENT: trachea midline  Respiratory: Lungs clear  Cardiovascular: Regular rate and rhythm, no murmurs  GI: nondistended, nontender, bowel sounds present  Lymph/Hematologic: no lymphadenopathy  Skin: dry, no rash  Musculoskeletal: good muscle tone, strength 5/5 in upper and lower extremities  Neurologic: no focal deficits  Neuropsychiatric: appropriate affact    DATA:  Lab:   Recent Labs   Lab Test 09/09/22  1536 09/09/21  0732   CHOL 124 133   HDL 36* 44   LDL 69 69   TRIG 94 102     ASSESSMENT:  69-year-old male seen for SVT and CAD.  He is doing well with no concerning cardiac symptoms.  Medications to be kept the same.  He will remain on clopidogrel because of his history of multiple stents with late stent thrombosis.  He is due for labs.    RECOMMENDATIONS:  1.  CAD   - continue current medications    2.  History SVT, status post ablation  -No symptomatic recurrence    Follow-up in 1 year with FLORENC[facility], check BMP and lipids.    Alvaro Caballero MD  Cardiology - [facility]  Pager:  [phone]  Text Page  May 28, 2024      "

## 2024-05-28 ENCOUNTER — OFFICE VISIT (OUTPATIENT)
Dept: CARDIOLOGY | Facility: CLINIC | Age: 69
End: 2024-05-28
Payer: COMMERCIAL

## 2024-05-28 ENCOUNTER — LAB (OUTPATIENT)
Dept: LAB | Facility: CLINIC | Age: 69
End: 2024-05-28
Payer: COMMERCIAL

## 2024-05-28 VITALS
OXYGEN SATURATION: 99 % | BODY MASS INDEX: 28.06 KG/M2 | HEIGHT: 65 IN | DIASTOLIC BLOOD PRESSURE: 80 MMHG | WEIGHT: 168.4 LBS | RESPIRATION RATE: 16 BRPM | SYSTOLIC BLOOD PRESSURE: 135 MMHG | HEART RATE: 50 BPM

## 2024-05-28 DIAGNOSIS — I49.3 PVC'S (PREMATURE VENTRICULAR CONTRACTIONS): ICD-10-CM

## 2024-05-28 DIAGNOSIS — R73.09 BLOOD GLUCOSE ABNORMAL: ICD-10-CM

## 2024-05-28 DIAGNOSIS — R73.09 BLOOD GLUCOSE ABNORMAL: Primary | ICD-10-CM

## 2024-05-28 DIAGNOSIS — I25.10 CORONARY ARTERY DISEASE INVOLVING NATIVE CORONARY ARTERY OF NATIVE HEART WITHOUT ANGINA PECTORIS: ICD-10-CM

## 2024-05-28 DIAGNOSIS — I47.10 PAROXYSMAL SUPRAVENTRICULAR TACHYCARDIA (H): ICD-10-CM

## 2024-05-28 DIAGNOSIS — N52.9 ERECTILE DYSFUNCTION, UNSPECIFIED ERECTILE DYSFUNCTION TYPE: ICD-10-CM

## 2024-05-28 DIAGNOSIS — E78.5 HYPERLIPIDEMIA LDL GOAL <70: ICD-10-CM

## 2024-05-28 LAB
ANION GAP SERPL CALCULATED.3IONS-SCNC: 11 MMOL/L (ref 7–15)
BUN SERPL-MCNC: 26.8 MG/DL (ref 8–23)
CALCIUM SERPL-MCNC: 8 MG/DL (ref 8.8–10.2)
CHLORIDE SERPL-SCNC: 101 MMOL/L (ref 98–107)
CHOLEST SERPL-MCNC: 141 MG/DL
CREAT SERPL-MCNC: 0.92 MG/DL (ref 0.67–1.17)
DEPRECATED HCO3 PLAS-SCNC: 24 MMOL/L (ref 22–29)
EGFRCR SERPLBLD CKD-EPI 2021: >90 ML/MIN/1.73M2
ERYTHROCYTE [DISTWIDTH] IN BLOOD BY AUTOMATED COUNT: 12.7 % (ref 10–15)
FASTING STATUS PATIENT QL REPORTED: YES
FASTING STATUS PATIENT QL REPORTED: YES
GLUCOSE SERPL-MCNC: 99 MG/DL (ref 70–99)
HBA1C MFR BLD: 6.1 % (ref 0–5.6)
HCT VFR BLD AUTO: 47.2 % (ref 40–53)
HDLC SERPL-MCNC: 40 MG/DL
HGB BLD-MCNC: 15.2 G/DL (ref 13.3–17.7)
LDLC SERPL CALC-MCNC: 87 MG/DL
MCH RBC QN AUTO: 26.3 PG (ref 26.5–33)
MCHC RBC AUTO-ENTMCNC: 32.2 G/DL (ref 31.5–36.5)
MCV RBC AUTO: 82 FL (ref 78–100)
NONHDLC SERPL-MCNC: 101 MG/DL
PLATELET # BLD AUTO: 214 10E3/UL (ref 150–450)
POTASSIUM SERPL-SCNC: 5 MMOL/L (ref 3.4–5.3)
RBC # BLD AUTO: 5.79 10E6/UL (ref 4.4–5.9)
SODIUM SERPL-SCNC: 136 MMOL/L (ref 135–145)
TRIGL SERPL-MCNC: 71 MG/DL
WBC # BLD AUTO: 4.9 10E3/UL (ref 4–11)

## 2024-05-28 PROCEDURE — 99214 OFFICE O/P EST MOD 30 MIN: CPT | Performed by: INTERNAL MEDICINE

## 2024-05-28 PROCEDURE — 80061 LIPID PANEL: CPT

## 2024-05-28 PROCEDURE — 36415 COLL VENOUS BLD VENIPUNCTURE: CPT

## 2024-05-28 PROCEDURE — 83036 HEMOGLOBIN GLYCOSYLATED A1C: CPT

## 2024-05-28 PROCEDURE — 80048 BASIC METABOLIC PNL TOTAL CA: CPT

## 2024-05-28 PROCEDURE — 85027 COMPLETE CBC AUTOMATED: CPT

## 2024-05-28 RX ORDER — CLOPIDOGREL BISULFATE 75 MG/1
75 TABLET ORAL DAILY
Qty: 90 TABLET | Refills: 3 | Status: SHIPPED | OUTPATIENT
Start: 2024-05-28

## 2024-05-28 RX ORDER — SILDENAFIL CITRATE 20 MG/1
20 TABLET ORAL DAILY PRN
Qty: 25 TABLET | Refills: 3 | Status: SHIPPED | OUTPATIENT
Start: 2024-05-28

## 2024-05-28 RX ORDER — METOPROLOL SUCCINATE 50 MG/1
50 TABLET, EXTENDED RELEASE ORAL DAILY
Qty: 90 TABLET | Refills: 3 | Status: SHIPPED | OUTPATIENT
Start: 2024-05-28

## 2024-05-28 RX ORDER — ATORVASTATIN CALCIUM 80 MG/1
80 TABLET, FILM COATED ORAL DAILY
Qty: 90 TABLET | Refills: 3 | Status: SHIPPED | OUTPATIENT
Start: 2024-05-28

## 2024-05-28 NOTE — LETTER
5/28/2024    Celia Lu PA-C  [address] [place] [id number]    RE: Jacques Sharp       Dear Colleague,     I had the pleasure of seeing Jacques Sharp in the [facility].  CARDIOLOGY VISIT    REASON FOR VISIT: SVT, CAD    SUBJECTIVE:  69-year-old male seen for follow-up of SVT and coronary artery disease.      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  He had a postop inferior STEMI with subsequent drug-eluting stent to the RCA and then staged intervention of the OM 2.  He was also noted to have atrial tachycardia originating near the his node and no ablation was pursued.  He has been on beta blocker therapy since then.           Echo 2014 showed ejection fraction 50-55% with mild inferior and inferolateral hypokinesis. Nuclear stress 2015 small fixed inferior wall defect consistent with infarct, no ischemia, ejection fraction 51% with mild inferior hypokinesis.        Angiogram 2019 showed 100% mid RCA lesion, 80% RPDA, 60% third RPL, 45% proximal to mid LAD, 25% proximal circumflex.  This was a late stent thrombosis of the distal RCA, repeat drug-eluting stents placed.  Echo showed EF 55%, inferior and inferolateral akinesis, normal RV, no valve disease.     Holter monitor 2019 showed sinus rhythm, 4% PVCs.    He has been doing well recently.  He will use the elliptical every other day and denies any shortness of breath or chest pain.  Blood pressure has not been checked at home recently.  He denies lightheadedness, dizziness, palpitations, or edema.    MEDICATIONS:  Current Outpatient Medications   Medication Sig Dispense Refill    acetaminophen (TYLENOL) 325 MG tablet Take 1-2 tablets (325-650 mg) by mouth every 4 hours as needed 100 tablet     aspirin (ASA) 81 MG chewable tablet CHEW AND SWALLOW ONE TABLET BY MOUTH ONCE DAILY 90 tablet 3    atorvastatin (LIPITOR) 80 MG tablet Take 1 tablet (80 mg) by mouth daily No further refills until seen by  cardiology--call [phone] to schedule 90 tablet 0    bisacodyl (DULCOLAX) 5 MG EC tablet Take 2 tablets at 3 pm the day before your procedure. If your procedure is before 11 am, take 2 additional tablets at 11 pm. If your procedure is after 11 am, take 2 additional tablets at 6 am. For additional instructions refer to your colonoscopy prep instructions. 4 tablet 0    clopidogrel (PLAVIX) 75 MG tablet Take 1 tablet (75 mg) by mouth daily No further refills until seen by cardiology--call [phone] to schedule 90 tablet 0    metoprolol succinate ER (TOPROL XL) 50 MG 24 hr tablet Take 1 tablet (50 mg) by mouth daily No further refills until seen by cardiology--call [phone] to schedule 90 tablet 0    nitroGLYcerin (NITROSTAT) 0.4 MG sublingual tablet Place 1 tablet (0.4 mg) under the tongue every 5 minutes as needed for chest pain DO NOT USE WITHIN 24 HOURS OF SILDENAFIL 60 tablet 0    polyethylene glycol (GOLYTELY) 236 g suspension The night before the exam at 6 pm drink an 8-ounce glass every 15 minutes until the jug is half empty. If you arrive before 11 AM: Drink the other half of the Golytely jug at 11 PM night before procedure. If you arrive after 11 AM: Drink the other half of the Golytely jug at 6 AM day of procedure. For additional instructions refer to your colonoscopy prep instructions. 4000 mL 0    sildenafil (REVATIO) 20 MG tablet Take 1 tablet (20 mg) by mouth daily as needed (erectile dysfunction) DO NOT USE WITHIN 24 HOURS OF NITROGLYCERIN (Patient not taking: Reported on 1/4/2023) 25 tablet 3     No current facility-administered medications for this visit.       ALLERGIES:  Allergies   Allergen Reactions    Sulfa Antibiotics      REVIEW OF SYSTEMS:  Constitutional:  No weight loss, fever, chills  HEENT:  Eyes:  No visual loss, blurred vision, double vision or yellow sclerae. No hearing loss, sneezing, congestion, runny nose or sore throat.  Skin:  No rash or itching.  Cardiovascular: per  "HPI  Respiratory: per HPI  GI:  No anorexia, nausea, vomiting or diarrhea. No abdominal pain or blood.  :  No dysurea, hematuria  Neurologic:  No headache, paralysis, ataxia, numbness or tingling in the extremities. No change in bowel or bladder control.  Musculoskeletal:  No muscle pain  Hematologic:  No bleeding or bruising.  Lymphatics:  No enlarged nodes. No history of splenectomy.  Endocrine:  No reports of sweating, cold or heat intolerance. No polyuria or polydipsia.  Allergies:  No history of asthma, hives, eczema or rhinitis.    PHYSICAL EXAM:  /80 (BP Location: Left arm, Patient Position: Sitting, Cuff Size: Adult Regular)   Pulse 50   Resp 16   Ht 1.638 m (5' 4.5\")   Wt 76.4 kg (168 lb 6.4 oz)   SpO2 99%   BMI 28.46 kg/m    Constitutional: awake, alert, no distress  Eyes: PERRL, sclera nonicteric  ENT: trachea midline  Respiratory: Lungs clear  Cardiovascular: Regular rate and rhythm, no murmurs  GI: nondistended, nontender, bowel sounds present  Lymph/Hematologic: no lymphadenopathy  Skin: dry, no rash  Musculoskeletal: good muscle tone, strength 5/5 in upper and lower extremities  Neurologic: no focal deficits  Neuropsychiatric: appropriate affact    DATA:  Lab:   Recent Labs   Lab Test 09/09/22  1536 09/09/21  0732   CHOL 124 133   HDL 36* 44   LDL 69 69   TRIG 94 102     ASSESSMENT:  69-year-old male seen for SVT and CAD.  He is doing well with no concerning cardiac symptoms.  Medications to be kept the same.  He will remain on clopidogrel because of his history of multiple stents with late stent thrombosis.  He is due for labs.    RECOMMENDATIONS:  1.  CAD   - continue current medications    2.  History SVT, status post ablation  -No symptomatic recurrence    Follow-up in 1 year with [facility], check BMP and lipids.    Alvaro Caballero MD  Cardiology - [facility]  Pager:  [phone]  Text Page  May 28, 2024        Thank you for allowing me to participate in the care of your " patient.      Sincerely,     Alvaro Caballero MD     [facility]  cc:   Alvaro Caballero MD

## 2024-07-02 ENCOUNTER — PATIENT OUTREACH (OUTPATIENT)
Dept: CARE COORDINATION | Facility: CLINIC | Age: 69
End: 2024-07-02
Payer: COMMERCIAL

## 2024-10-05 ENCOUNTER — OFFICE VISIT (OUTPATIENT)
Dept: URGENT CARE | Facility: URGENT CARE | Age: 69
End: 2024-10-05
Payer: COMMERCIAL

## 2024-10-05 VITALS
DIASTOLIC BLOOD PRESSURE: 84 MMHG | TEMPERATURE: 96.4 F | OXYGEN SATURATION: 97 % | WEIGHT: 169.38 LBS | HEART RATE: 62 BPM | SYSTOLIC BLOOD PRESSURE: 128 MMHG | BODY MASS INDEX: 28.62 KG/M2

## 2024-10-05 DIAGNOSIS — S30.862A TICK BITE OF PENIS, INITIAL ENCOUNTER: Primary | ICD-10-CM
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
## 2024-10-05 PROCEDURE — 99213 OFFICE O/P EST LOW 20 MIN: CPT | Performed by: PHYSICIAN ASSISTANT

## 2024-10-05 RX ORDER — DOXYCYCLINE 100 MG/1
200 CAPSULE ORAL ONCE
Qty: 2 CAPSULE | Refills: 0 | Status: SHIPPED | OUTPATIENT
Start: 2024-10-05 | End: 2024-10-05

## 2024-10-05 NOTE — PROGRESS NOTES
"  Assessment & Plan     Tick bite of penis, initial encounter  Will provide prophylactic treatment with doxycycline 200mg once daily x 1 day. Continue to monitor the area. Follow up as needed.   - doxycycline monohydrate (MONODOX) 100 MG capsule; Take 2 capsules (200 mg) by mouth once for 1 dose.          BMI  Estimated body mass index is 28.62 kg/m  as calculated from the following:    Height as of 5/28/24: 1.638 m (5' 4.5\").    Weight as of this encounter: 76.8 kg (169 lb 6 oz).             Return in about 1 week (around 10/12/2024), or if symptoms worsen or fail to improve.            Subjective   Chief Complaint   Patient presents with    Insect Bites     Deer tick bite towards testicles        HPI     Tick bite     Onset of symptoms was 1 day(s) ago.  Course of illness is same.    Severity mild  Current and Associated symptoms: removed deer tick last night   Treatment measures tried include None tried.  Predisposing factors include None.                      Objective    /84 (BP Location: Right arm, Patient Position: Sitting, Cuff Size: Adult Regular)   Pulse 62   Temp (!) 96.4  F (35.8  C) (Tympanic)   Wt 76.8 kg (169 lb 6 oz)   SpO2 97%   BMI 28.62 kg/m    Body mass index is 28.62 kg/m .    Physical Exam  Constitutional:       General: He is not in acute distress.  Skin:     Comments: Small area of redness on shaft of penis from recent tick bite. No signs of infection.    Neurological:      Mental Status: He is alert.                    Signed Electronically by: Elvia Asencio PA-C    "

## 2024-10-24 ENCOUNTER — OFFICE VISIT (OUTPATIENT)
Dept: FAMILY MEDICINE | Facility: CLINIC | Age: 69
End: 2024-10-24
Payer: COMMERCIAL

## 2024-10-24 VITALS
BODY MASS INDEX: 27.82 KG/M2 | HEIGHT: 65 IN | DIASTOLIC BLOOD PRESSURE: 70 MMHG | SYSTOLIC BLOOD PRESSURE: 122 MMHG | OXYGEN SATURATION: 98 % | WEIGHT: 167 LBS | TEMPERATURE: 97.6 F | RESPIRATION RATE: 18 BRPM | HEART RATE: 54 BPM

## 2024-10-24 DIAGNOSIS — R03.0 ELEVATED BP WITHOUT DIAGNOSIS OF HYPERTENSION: ICD-10-CM

## 2024-10-24 DIAGNOSIS — R73.03 PREDIABETES: ICD-10-CM

## 2024-10-24 DIAGNOSIS — L30.9 DERMATITIS: ICD-10-CM

## 2024-10-24 DIAGNOSIS — Z00.00 ENCOUNTER FOR MEDICARE ANNUAL WELLNESS EXAM: Primary | ICD-10-CM

## 2024-10-24 DIAGNOSIS — E83.51 HYPOCALCEMIA: ICD-10-CM

## 2024-10-24 DIAGNOSIS — G47.00 INSOMNIA, UNSPECIFIED TYPE: ICD-10-CM

## 2024-10-24 PROBLEM — H20.012 PRIMARY IRIDOCYCLITIS OF LEFT EYE: Status: RESOLVED | Noted: 2024-10-24 | Resolved: 2024-10-24

## 2024-10-24 PROBLEM — H40.053 BILATERAL OCULAR HYPERTENSION: Status: ACTIVE | Noted: 2024-10-24

## 2024-10-24 PROBLEM — H20.012 PRIMARY IRIDOCYCLITIS OF LEFT EYE: Status: ACTIVE | Noted: 2024-10-24

## 2024-10-24 LAB
ANION GAP SERPL CALCULATED.3IONS-SCNC: 9 MMOL/L (ref 7–15)
BUN SERPL-MCNC: 17.2 MG/DL (ref 8–23)
CALCIUM SERPL-MCNC: 9.4 MG/DL (ref 8.8–10.4)
CHLORIDE SERPL-SCNC: 105 MMOL/L (ref 98–107)
CREAT SERPL-MCNC: 0.91 MG/DL (ref 0.67–1.17)
EGFRCR SERPLBLD CKD-EPI 2021: >90 ML/MIN/1.73M2
EST. AVERAGE GLUCOSE BLD GHB EST-MCNC: 123 MG/DL
GLUCOSE SERPL-MCNC: 101 MG/DL (ref 70–99)
HBA1C MFR BLD: 5.9 % (ref 0–5.6)
HCO3 SERPL-SCNC: 27 MMOL/L (ref 22–29)
POTASSIUM SERPL-SCNC: 4.9 MMOL/L (ref 3.4–5.3)
PTH-INTACT SERPL-MCNC: 38 PG/ML (ref 15–65)
SODIUM SERPL-SCNC: 141 MMOL/L (ref 135–145)
VIT D+METAB SERPL-MCNC: 24 NG/ML (ref 20–50)

## 2024-10-24 PROCEDURE — 90662 IIV NO PRSV INCREASED AG IM: CPT | Performed by: PHYSICIAN ASSISTANT

## 2024-10-24 PROCEDURE — 83970 ASSAY OF PARATHORMONE: CPT | Performed by: PHYSICIAN ASSISTANT

## 2024-10-24 PROCEDURE — 99214 OFFICE O/P EST MOD 30 MIN: CPT | Mod: 25 | Performed by: PHYSICIAN ASSISTANT

## 2024-10-24 PROCEDURE — G0439 PPPS, SUBSEQ VISIT: HCPCS | Performed by: PHYSICIAN ASSISTANT

## 2024-10-24 PROCEDURE — 83036 HEMOGLOBIN GLYCOSYLATED A1C: CPT | Performed by: PHYSICIAN ASSISTANT

## 2024-10-24 PROCEDURE — 36415 COLL VENOUS BLD VENIPUNCTURE: CPT | Performed by: PHYSICIAN ASSISTANT

## 2024-10-24 PROCEDURE — G0008 ADMIN INFLUENZA VIRUS VAC: HCPCS | Performed by: PHYSICIAN ASSISTANT

## 2024-10-24 PROCEDURE — 82306 VITAMIN D 25 HYDROXY: CPT | Performed by: PHYSICIAN ASSISTANT

## 2024-10-24 PROCEDURE — 80048 BASIC METABOLIC PNL TOTAL CA: CPT | Performed by: PHYSICIAN ASSISTANT

## 2024-10-24 RX ORDER — TRIAMCINOLONE ACETONIDE 1 MG/G
CREAM TOPICAL 2 TIMES DAILY PRN
Qty: 15 G | Refills: 3 | Status: SHIPPED | OUTPATIENT
Start: 2024-10-24

## 2024-10-24 RX ORDER — TRAZODONE HYDROCHLORIDE 50 MG/1
25-50 TABLET, FILM COATED ORAL AT BEDTIME
Qty: 90 TABLET | Refills: 3 | Status: SHIPPED | OUTPATIENT
Start: 2024-10-24

## 2024-10-24 SDOH — HEALTH STABILITY: PHYSICAL HEALTH: ON AVERAGE, HOW MANY DAYS PER WEEK DO YOU ENGAGE IN MODERATE TO STRENUOUS EXERCISE (LIKE A BRISK WALK)?: 3 DAYS

## 2024-10-24 ASSESSMENT — SOCIAL DETERMINANTS OF HEALTH (SDOH): HOW OFTEN DO YOU GET TOGETHER WITH FRIENDS OR RELATIVES?: MORE THAN THREE TIMES A WEEK

## 2024-10-24 ASSESSMENT — PAIN SCALES - GENERAL: PAINLEVEL_OUTOF10: NO PAIN (0)

## 2024-10-24 NOTE — PATIENT INSTRUCTIONS
Lab and BP recheck today  Flu shot here - go to pharmacy for other vaccines - tetanus (arm sore), shingles and option of RSV vaccine.  Note shingles and RSV vaccines can make people feel a bit under the weather.      Calcium make sure to get 1000 mg (or 3 servings) daily.      Increase exercise - 2.5 hrs/wk total    Try trazodone for sleep - let me know if need adjustment  Any erections from trazodone - to emergency room    You do not need medications but continue to focus on healthy lifestyle.  Continue effort towards weight loss.  Make sure you are exercising for at least 30 minutes 5 days per week.  Eat healthy.  Half of your daily food should be fruits and vegetables.  Roughly one-fourth of your intake should be whole grain carbohydrate such as brown rice, whole grain pasta, or whole grain bread.  Try to include heart-healthy oils such as nuts (except peanuts), seeds, olive oil, avocado, and salmon.  These suggestions include the nutrition guidelines for all Americans (see myplate.gov) as well as the Mediterranean diet for heart health.      Patient Education   Preventive Care Advice   This is general advice given by our system to help you stay healthy. However, your care team may have specific advice just for you. Please talk to your care team about your preventive care needs.  Nutrition  Eat 5 or more servings of fruits and vegetables each day.  Try wheat bread, brown rice and whole grain pasta (instead of white bread, rice, and pasta).  Get enough calcium and vitamin D. Check the label on foods and aim for 100% of the RDA (recommended daily allowance).  Lifestyle  Exercise at least 150 minutes each week  (30 minutes a day, 5 days a week).  Do muscle strengthening activities 2 days a week. These help control your weight and prevent disease.  No smoking.  Wear sunscreen to prevent skin cancer.  Have a dental exam and cleaning every 6 months.  Yearly exams  See your health care team every year to talk  about:  Any changes in your health.  Any medicines your care team has prescribed.  Preventive care, family planning, and ways to prevent chronic diseases.  Shots (vaccines)   HPV shots (up to age 26), if you've never had them before.  Hepatitis B shots (up to age 59), if you've never had them before.  COVID-19 shot: Get this shot when it's due.  Flu shot: Get a flu shot every year.  Tetanus shot: Get a tetanus shot every 10 years.  Pneumococcal, hepatitis A, and RSV shots: Ask your care team if you need these based on your risk.  Shingles shot (for age 50 and up)  General health tests  Diabetes screening:  Starting at age 35, Get screened for diabetes at least every 3 years.  If you are younger than age 35, ask your care team if you should be screened for diabetes.  Cholesterol test: At age 39, start having a cholesterol test every 5 years, or more often if advised.  Bone density scan (DEXA): At age 50, ask your care team if you should have this scan for osteoporosis (brittle bones).  Hepatitis C: Get tested at least once in your life.  STIs (sexually transmitted infections)  Before age 24: Ask your care team if you should be screened for STIs.  After age 24: Get screened for STIs if you're at risk. You are at risk for STIs (including HIV) if:  You are sexually active with more than one person.  You don't use condoms every time.  You or a partner was diagnosed with a sexually transmitted infection.  If you are at risk for HIV, ask about PrEP medicine to prevent HIV.  Get tested for HIV at least once in your life, whether you are at risk for HIV or not.  Cancer screening tests  Cervical cancer screening: If you have a cervix, begin getting regular cervical cancer screening tests starting at age 21.  Breast cancer scan (mammogram): If you've ever had breasts, begin having regular mammograms starting at age 40. This is a scan to check for breast cancer.  Colon cancer screening: It is important to start screening for  colon cancer at age 45.  Have a colonoscopy test every 10 years (or more often if you're at risk) Or, ask your provider about stool tests like a FIT test every year or Cologuard test every 3 years.  To learn more about your testing options, visit:   .  For help making a decision, visit:   https://bit.adolph/df83442.  Prostate cancer screening test: If you have a prostate, ask your care team if a prostate cancer screening test (PSA) at age 55 is right for you.  Lung cancer screening: If you are a current or former smoker ages 50 to 80, ask your care team if ongoing lung cancer screenings are right for you.  For informational purposes only. Not to replace the advice of your health care provider. Copyright   2023 [employer]. All rights reserved. Clinically reviewed by the  [employer] [facility]. [employer] 250129 - REV 01/24.  Hearing Loss: Care Instructions  Overview     Hearing loss is a sudden or slow decrease in how well you hear. It can range from slight to profound. Permanent hearing loss can occur with aging. It also can happen when you are exposed long-term to loud noise. Examples include listening to loud music, riding motorcycles, or being around other loud machines.  Hearing loss can affect your work and home life. It can make you feel lonely or depressed. You may feel that you have lost your independence. But hearing aids and other devices can help you hear better and feel connected to others.  Follow-up care is a key part of your treatment and safety. Be sure to make and go to all appointments, and call your doctor if you are having problems. It's also a good idea to know your test results and keep a list of the medicines you take.  How can you care for yourself at home?  Avoid loud noises whenever possible. This helps keep your hearing from getting worse.  Always wear hearing protection around loud noises.  Wear a hearing aid as directed.  A professional can help you pick a  "hearing aid that will work best for you.  You can also get hearing aids over the counter for mild to moderate hearing loss.  Have hearing tests as your doctor suggests. They can show whether your hearing has changed. Your hearing aid may need to be adjusted.  Use other devices as needed. These may include:  Telephone amplifiers and hearing aids that can connect to a television, stereo, radio, or microphone.  Devices that use lights or vibrations. These alert you to the doorbell, a ringing telephone, or a baby monitor.  Television closed-captioning. This shows the words at the bottom of the screen. Most new TVs can do this.  TTY (text telephone). This lets you type messages back and forth on the telephone instead of talking or listening. These devices are also called TDD. When messages are typed on the keyboard, they are sent over the phone line to a receiving TTY. The message is shown on a monitor.  Use text messaging, social media, and email if it is hard for you to communicate by telephone.  Try to learn a listening technique called speechreading. It is not lipreading. You pay attention to people's gestures, expressions, posture, and tone of voice. These clues can help you understand what a person is saying. Face the person you are talking to, and have them face you. Make sure the lighting is good. You need to see the other person's face clearly.  Think about counseling if you need help to adjust to your hearing loss.  When should you call for help?  Watch closely for changes in your health, and be sure to contact your doctor if:    You think your hearing is getting worse.     You have new symptoms, such as dizziness or nausea.   Where can you learn more?  Go to https://www.[employer].net/patiented  Enter R798 in the search box to learn more about \"Hearing Loss: Care Instructions.\"  Current as of: September 27, 2023  Content Version: 14.2 2024 [employer].   Care instructions adapted under license by " your healthcare professional. If you have questions about a medical condition or this instruction, always ask your healthcare professional. Healthwise, [facility] disclaims any warranty or liability for your use of this information.    Substance Use Disorder: Care Instructions  Overview     You can improve your life and health by stopping your use of alcohol or drugs. When you don't drink or use drugs, you may feel and sleep better. You may get along better with your family, friends, and coworkers. There are medicines and programs that can help with substance use disorder.  How can you care for yourself at home?  Here are some ways to help you stay sober and prevent relapse.  If you have been given medicine to help keep you sober or reduce your cravings, be sure to take it exactly as prescribed.  Talk to your doctor about programs that can help you stop using drugs or drinking alcohol.  Do not keep alcohol or drugs in your home.  Plan ahead. Think about what you'll say if other people ask you to drink or use drugs. Try not to spend time with people who drink or use drugs.  Use the time and money spent on drinking or drugs to do something that's important to you.  Preventing a relapse  Have a plan to deal with relapse. Learn to recognize changes in your thinking that lead you to drink or use drugs. Get help before you start to drink or use drugs again.  Try to stay away from situations, friends, or places that may lead you to drink or use drugs.  If you feel the need to drink alcohol or use drugs again, seek help right away. Call a trusted friend or family member. Some people get support from organizations such as Narcotics Anonymous or [employer] or from treatment facilities.  If you relapse, get help as soon as you can. Some people make a plan with another person that outlines what they want that person to do for them if they relapse. The plan usually includes how to handle the relapse and who to notify in  case of relapse.  Don't give up. Remember that a relapse doesn't mean that you have failed. Use the experience to learn the triggers that lead you to drink or use drugs. Then quit again. Recovery is a lifelong process. Many people have several relapses before they are able to quit for good.  Follow-up care is a key part of your treatment and safety. Be sure to make and go to all appointments, and call your doctor if you are having problems. It's also a good idea to know your test results and keep a list of the medicines you take.  When should you call for help?   Call 911  anytime you think you may need emergency care. For example, call if you or someone else:    Has overdosed or has withdrawal signs. Be sure to tell the emergency workers that you are or someone else is using or trying to quit using drugs. Overdose or withdrawal signs may include:  Losing consciousness.  Seizure.  Seeing or hearing things that aren't there (hallucinations).     Is thinking or talking about suicide or harming others.   Where to get help 24 hours a day, 7 days a week   If you or someone you know talks about suicide, self-harm, a mental health crisis, a substance use crisis, or any other kind of emotional distress, get help right away. You can:    Call the Suicide and Crisis Lifeline at 988.     Call 3-528-977-CNFW (1-630.127.7507).     Text HOME to 583102 to access the Crisis Text Line.   Consider saving these numbers in your phone.  Go to [employer] for more information or to chat online.  Call your doctor now or seek immediate medical care if:    You are having withdrawal symptoms. These may include nausea or vomiting, sweating, shakiness, and anxiety.   Watch closely for changes in your health, and be sure to contact your doctor if:    You have a relapse.     You need more help or support to stop.   Where can you learn more?  Go to https://www.healthwise.net/patiented  Enter H573 in the search box to learn more about  "\"Substance Use Disorder: Care Instructions.\"  Current as of: November 15, 2023  Content Version: 14.2 2024 [facility], [facility].   Care instructions adapted under license by your healthcare professional. If you have questions about a medical condition or this instruction, always ask your healthcare professional. [employer] disclaims any warranty or liability for your use of this information.       "

## 2024-10-24 NOTE — PROGRESS NOTES
Preventive Care Visit  [facility]  Celia Lu PA-C, Family Medicine  Oct 24, 2024      Assessment & Plan     Encounter for Medicare annual wellness exam    Prediabetes  Discussed lifestyle treatment    - Hemoglobin A1c; Future    Hypocalcemia  Suspect is from dietary deficiency  - Basic metabolic panel  (Ca, Cl, CO2, Creat, Gluc, K, Na, BUN); Future  - Vitamin D Deficiency; Future  - Parathyroid Hormone Intact; Future    Dermatitis  Treat pre tibial  - triamcinolone (KENALOG) 0.1 % external cream; Apply topically 2 times daily as needed (rash on right shin).    Insomnia, unspecified type  Start new med  - traZODone (DESYREL) 50 MG tablet; Take 0.5-1 tablets (25-50 mg) by mouth at bedtime.    Elevated BP without diagnosis of hypertension  recheck    Patient Instructions   Lab and BP recheck today  Flu shot here - go to pharmacy for other vaccines - tetanus (arm sore), shingles and option of RSV vaccine.  Note shingles and RSV vaccines can make people feel a bit under the weather.      Calcium make sure to get 1000 mg (or 3 servings) daily.      Increase exercise - 2.5 hrs/wk total    Try trazodone for sleep - let me know if need adjustment  Any erections from trazodone - to emergency room    You do not need medications but continue to focus on healthy lifestyle.  Continue effort towards weight loss.  Make sure you are exercising for at least 30 minutes 5 days per week.  Eat healthy.  Half of your daily food should be fruits and vegetables.  Roughly one-fourth of your intake should be whole grain carbohydrate such as brown rice, whole grain pasta, or whole grain bread.  Try to include heart-healthy oils such as nuts (except peanuts), seeds, olive oil, avocado, and salmon.  These suggestions include the nutrition guidelines for all Americans (see myplate.gov) as well as the Mediterranean diet for heart health.      Patient Education  Preventive Care Advice   This is general advice  given by our system to help you stay healthy. However, your care team may have specific advice just for you. Please talk to your care team about your preventive care needs.  Nutrition  Eat 5 or more servings of fruits and vegetables each day.  Try wheat bread, brown rice and whole grain pasta (instead of white bread, rice, and pasta).  Get enough calcium and vitamin D. Check the label on foods and aim for 100% of the RDA (recommended daily allowance).  Lifestyle  Exercise at least 150 minutes each week  (30 minutes a day, 5 days a week).  Do muscle strengthening activities 2 days a week. These help control your weight and prevent disease.  No smoking.  Wear sunscreen to prevent skin cancer.  Have a dental exam and cleaning every 6 months.  Yearly exams  See your health care team every year to talk about:  Any changes in your health.  Any medicines your care team has prescribed.  Preventive care, family planning, and ways to prevent chronic diseases.  Shots (vaccines)   HPV shots (up to age 26), if you've never had them before.  Hepatitis B shots (up to age 59), if you've never had them before.  COVID-19 shot: Get this shot when it's due.  Flu shot: Get a flu shot every year.  Tetanus shot: Get a tetanus shot every 10 years.  Pneumococcal, hepatitis A, and RSV shots: Ask your care team if you need these based on your risk.  Shingles shot (for age 50 and up)  General health tests  Diabetes screening:  Starting at age 35, Get screened for diabetes at least every 3 years.  If you are younger than age 35, ask your care team if you should be screened for diabetes.  Cholesterol test: At age 39, start having a cholesterol test every 5 years, or more often if advised.  Bone density scan (DEXA): At age 50, ask your care team if you should have this scan for osteoporosis (brittle bones).  Hepatitis C: Get tested at least once in your life.  STIs (sexually transmitted infections)  Before age 24: Ask your care team if you should  be screened for STIs.  After age 24: Get screened for STIs if you're at risk. You are at risk for STIs (including HIV) if:  You are sexually active with more than one person.  You don't use condoms every time.  You or a partner was diagnosed with a sexually transmitted infection.  If you are at risk for HIV, ask about PrEP medicine to prevent HIV.  Get tested for HIV at least once in your life, whether you are at risk for HIV or not.  Cancer screening tests  Cervical cancer screening: If you have a cervix, begin getting regular cervical cancer screening tests starting at age 21.  Breast cancer scan (mammogram): If you've ever had breasts, begin having regular mammograms starting at age 40. This is a scan to check for breast cancer.  Colon cancer screening: It is important to start screening for colon cancer at age 45.  Have a colonoscopy test every 10 years (or more often if you're at risk) Or, ask your provider about stool tests like a FIT test every year or Cologuard test every 3 years.  To learn more about your testing options, visit:   .  For help making a decision, visit:   https://bit.ly/yv28787.  Prostate cancer screening test: If you have a prostate, ask your care team if a prostate cancer screening test (PSA) at age 55 is right for you.  Lung cancer screening: If you are a current or former smoker ages 50 to 80, ask your care team if ongoing lung cancer screenings are right for you.  For informational purposes only. Not to replace the advice of your health care provider. Copyright   2023 [facility]. All rights reserved. Clinically reviewed by the [facility] Transitions Program. [employer] [id number] - REV 01/24.  Hearing Loss: Care Instructions  Overview     Hearing loss is a sudden or slow decrease in how well you hear. It can range from slight to profound. Permanent hearing loss can occur with aging. It also can happen when you are exposed long-term to loud noise. Examples include  listening to loud music, riding motorcycles, or being around other loud machines.  Hearing loss can affect your work and home life. It can make you feel lonely or depressed. You may feel that you have lost your independence. But hearing aids and other devices can help you hear better and feel connected to others.  Follow-up care is a key part of your treatment and safety. Be sure to make and go to all appointments, and call your doctor if you are having problems. It's also a good idea to know your test results and keep a list of the medicines you take.  How can you care for yourself at home?  Avoid loud noises whenever possible. This helps keep your hearing from getting worse.  Always wear hearing protection around loud noises.  Wear a hearing aid as directed.  A professional can help you pick a hearing aid that will work best for you.  You can also get hearing aids over the counter for mild to moderate hearing loss.  Have hearing tests as your doctor suggests. They can show whether your hearing has changed. Your hearing aid may need to be adjusted.  Use other devices as needed. These may include:  Telephone amplifiers and hearing aids that can connect to a television, stereo, radio, or microphone.  Devices that use lights or vibrations. These alert you to the doorbell, a ringing telephone, or a baby monitor.  Television closed-captioning. This shows the words at the bottom of the screen. Most new TVs can do this.  TTY (text telephone). This lets you type messages back and forth on the telephone instead of talking or listening. These devices are also called TDD. When messages are typed on the keyboard, they are sent over the phone line to a receiving TTY. The message is shown on a monitor.  Use text messaging, social media, and email if it is hard for you to communicate by telephone.  Try to learn a listening technique called speechreading. It is not lipreading. You pay attention to people's gestures, expressions,  "posture, and tone of voice. These clues can help you understand what a person is saying. Face the person you are talking to, and have them face you. Make sure the lighting is good. You need to see the other person's face clearly.  Think about counseling if you need help to adjust to your hearing loss.  When should you call for help?  Watch closely for changes in your health, and be sure to contact your doctor if:    You think your hearing is getting worse.     You have new symptoms, such as dizziness or nausea.   Where can you learn more?  Go to https://www.[employer].net/patiented  Enter R798 in the search box to learn more about \"Hearing Loss: Care Instructions.\"  Current as of: September 27, 2023  Content Version: 14.2 2024 [employer].   Care instructions adapted under license by your healthcare professional. If you have questions about a medical condition or this instruction, always ask your healthcare professional. [employer] disclaims any warranty or liability for your use of this information.    Substance Use Disorder: Care Instructions  Overview     You can improve your life and health by stopping your use of alcohol or drugs. When you don't drink or use drugs, you may feel and sleep better. You may get along better with your family, friends, and coworkers. There are medicines and programs that can help with substance use disorder.  How can you care for yourself at home?  Here are some ways to help you stay sober and prevent relapse.  If you have been given medicine to help keep you sober or reduce your cravings, be sure to take it exactly as prescribed.  Talk to your doctor about programs that can help you stop using drugs or drinking alcohol.  Do not keep alcohol or drugs in your home.  Plan ahead. Think about what you'll say if other people ask you to drink or use drugs. Try not to spend time with people who drink or use drugs.  Use the time and money spent on drinking or " drugs to do something that's important to you.  Preventing a relapse  Have a plan to deal with relapse. Learn to recognize changes in your thinking that lead you to drink or use drugs. Get help before you start to drink or use drugs again.  Try to stay away from situations, friends, or places that may lead you to drink or use drugs.  If you feel the need to drink alcohol or use drugs again, seek help right away. Call a trusted friend or family member. Some people get support from organizations such as Narcotics Anonymous or [employer] or from treatment facilities.  If you relapse, get help as soon as you can. Some people make a plan with another person that outlines what they want that person to do for them if they relapse. The plan usually includes how to handle the relapse and who to notify in case of relapse.  Don't give up. Remember that a relapse doesn't mean that you have failed. Use the experience to learn the triggers that lead you to drink or use drugs. Then quit again. Recovery is a lifelong process. Many people have several relapses before they are able to quit for good.  Follow-up care is a key part of your treatment and safety. Be sure to make and go to all appointments, and call your doctor if you are having problems. It's also a good idea to know your test results and keep a list of the medicines you take.  When should you call for help?   Call 911  anytime you think you may need emergency care. For example, call if you or someone else:    Has overdosed or has withdrawal signs. Be sure to tell the emergency workers that you are or someone else is using or trying to quit using drugs. Overdose or withdrawal signs may include:  Losing consciousness.  Seizure.  Seeing or hearing things that aren't there (hallucinations).     Is thinking or talking about suicide or harming others.   Where to get help 24 hours a day, 7 days a week   If you or someone you know talks about suicide, self-harm, a mental  "health crisis, a substance use crisis, or any other kind of emotional distress, get help right away. You can:    Call the Suicide and Crisis Lifeline at 988.     Call 6-015-887-DYYH (1-907.882.8256).     Text HOME to 271816 to access the Crisis Text Line.   Consider saving these numbers in your phone.  Go to [email or web address]Virtualtwo for more information or to chat online.  Call your doctor now or seek immediate medical care if:    You are having withdrawal symptoms. These may include nausea or vomiting, sweating, shakiness, and anxiety.   Watch closely for changes in your health, and be sure to contact your doctor if:    You have a relapse.     You need more help or support to stop.   Where can you learn more?  Go to https://www.[employer].net/patiented  Enter H573 in the search box to learn more about \"Substance Use Disorder: Care Instructions.\"  Current as of: November 15, 2023  Content Version: 14.2 2024 [employer].   Care instructions adapted under license by your healthcare professional. If you have questions about a medical condition or this instruction, always ask your healthcare professional. Healthwise[employer] Incorporated disclaims any warranty or liability for your use of this information.         BMI  Estimated body mass index is 28.22 kg/m  as calculated from the following:    Height as of this encounter: 1.638 m (5' 4.5\").    Weight as of this encounter: 75.8 kg (167 lb).       Counseling  Appropriate preventive services were addressed with this patient via screening, questionnaire, or discussion as appropriate for fall prevention, nutrition, physical activity, Tobacco-use cessation, social engagement, weight loss and cognition.  Checklist reviewing preventive services available has been given to the patient.  Reviewed patient's diet, addressing concerns and/or questions.   He is at risk for lack of exercise and has been provided with information to increase physical activity for the benefit of his " well-being.   He is at risk for psychosocial distress and has been provided with information to reduce risk.   The patient was provided with written information regarding signs of hearing loss.       Patient Instructions   Lab and BP recheck today  Flu shot here - go to pharmacy for other vaccines - tetanus (arm sore), shingles and option of RSV vaccine.  Note shingles and RSV vaccines can make people feel a bit under the weather.      Calcium make sure to get 1000 mg (or 3 servings) daily.      Increase exercise - 2.5 hrs/wk total    Try trazodone for sleep - let me know if need adjustment  Any erections from trazodone - to emergency room    You do not need medications but continue to focus on healthy lifestyle.  Continue effort towards weight loss.  Make sure you are exercising for at least 30 minutes 5 days per week.  Eat healthy.  Half of your daily food should be fruits and vegetables.  Roughly one-fourth of your intake should be whole grain carbohydrate such as brown rice, whole grain pasta, or whole grain bread.  Try to include heart-healthy oils such as nuts (except peanuts), seeds, olive oil, avocado, and salmon.  These suggestions include the nutrition guidelines for all Americans (see myplate.gov) as well as the Mediterranean diet for heart health.      Patient Education  Preventive Care Advice   This is general advice given by our system to help you stay healthy. However, your care team may have specific advice just for you. Please talk to your care team about your preventive care needs.  Nutrition  Eat 5 or more servings of fruits and vegetables each day.  Try wheat bread, brown rice and whole grain pasta (instead of white bread, rice, and pasta).  Get enough calcium and vitamin D. Check the label on foods and aim for 100% of the RDA (recommended daily allowance).  Lifestyle  Exercise at least 150 minutes each week  (30 minutes a day, 5 days a week).  Do muscle strengthening activities 2 days a week.  These help control your weight and prevent disease.  No smoking.  Wear sunscreen to prevent skin cancer.  Have a dental exam and cleaning every 6 months.  Yearly exams  See your health care team every year to talk about:  Any changes in your health.  Any medicines your care team has prescribed.  Preventive care, family planning, and ways to prevent chronic diseases.  Shots (vaccines)   HPV shots (up to age 26), if you've never had them before.  Hepatitis B shots (up to age 59), if you've never had them before.  COVID-19 shot: Get this shot when it's due.  Flu shot: Get a flu shot every year.  Tetanus shot: Get a tetanus shot every 10 years.  Pneumococcal, hepatitis A, and RSV shots: Ask your care team if you need these based on your risk.  Shingles shot (for age 50 and up)  General health tests  Diabetes screening:  Starting at age 35, Get screened for diabetes at least every 3 years.  If you are younger than age 35, ask your care team if you should be screened for diabetes.  Cholesterol test: At age 39, start having a cholesterol test every 5 years, or more often if advised.  Bone density scan (DEXA): At age 50, ask your care team if you should have this scan for osteoporosis (brittle bones).  Hepatitis C: Get tested at least once in your life.  STIs (sexually transmitted infections)  Before age 24: Ask your care team if you should be screened for STIs.  After age 24: Get screened for STIs if you're at risk. You are at risk for STIs (including HIV) if:  You are sexually active with more than one person.  You don't use condoms every time.  You or a partner was diagnosed with a sexually transmitted infection.  If you are at risk for HIV, ask about PrEP medicine to prevent HIV.  Get tested for HIV at least once in your life, whether you are at risk for HIV or not.  Cancer screening tests  Cervical cancer screening: If you have a cervix, begin getting regular cervical cancer screening tests starting at age 21.  Breast  cancer scan (mammogram): If you've ever had breasts, begin having regular mammograms starting at age 40. This is a scan to check for breast cancer.  Colon cancer screening: It is important to start screening for colon cancer at age 45.  Have a colonoscopy test every 10 years (or more often if you're at risk) Or, ask your provider about stool tests like a FIT test every year or Cologuard test every 3 years.  To learn more about your testing options, visit:   .  For help making a decision, visit:   https://bit.ly/hz58368.  Prostate cancer screening test: If you have a prostate, ask your care team if a prostate cancer screening test (PSA) at age 55 is right for you.  Lung cancer screening: If you are a current or former smoker ages 50 to 80, ask your care team if ongoing lung cancer screenings are right for you.  For informational purposes only. Not to replace the advice of your health care provider. Copyright   2023 [employer]. All rights reserved. Clinically reviewed by the [facility] Transitions Program. 1d4 Pty 115699 - REV 01/24.  Hearing Loss: Care Instructions  Overview     Hearing loss is a sudden or slow decrease in how well you hear. It can range from slight to profound. Permanent hearing loss can occur with aging. It also can happen when you are exposed long-term to loud noise. Examples include listening to loud music, riding motorcycles, or being around other loud machines.  Hearing loss can affect your work and home life. It can make you feel lonely or depressed. You may feel that you have lost your independence. But hearing aids and other devices can help you hear better and feel connected to others.  Follow-up care is a key part of your treatment and safety. Be sure to make and go to all appointments, and call your doctor if you are having problems. It's also a good idea to know your test results and keep a list of the medicines you take.  How can you care for yourself at  home?  Avoid loud noises whenever possible. This helps keep your hearing from getting worse.  Always wear hearing protection around loud noises.  Wear a hearing aid as directed.  A professional can help you pick a hearing aid that will work best for you.  You can also get hearing aids over the counter for mild to moderate hearing loss.  Have hearing tests as your doctor suggests. They can show whether your hearing has changed. Your hearing aid may need to be adjusted.  Use other devices as needed. These may include:  Telephone amplifiers and hearing aids that can connect to a television, stereo, radio, or microphone.  Devices that use lights or vibrations. These alert you to the doorbell, a ringing telephone, or a baby monitor.  Television closed-captioning. This shows the words at the bottom of the screen. Most new TVs can do this.  TTY (text telephone). This lets you type messages back and forth on the telephone instead of talking or listening. These devices are also called TDD. When messages are typed on the keyboard, they are sent over the phone line to a receiving TTY. The message is shown on a monitor.  Use text messaging, social media, and email if it is hard for you to communicate by telephone.  Try to learn a listening technique called speechreading. It is not lipreading. You pay attention to people's gestures, expressions, posture, and tone of voice. These clues can help you understand what a person is saying. Face the person you are talking to, and have them face you. Make sure the lighting is good. You need to see the other person's face clearly.  Think about counseling if you need help to adjust to your hearing loss.  When should you call for help?  Watch closely for changes in your health, and be sure to contact your doctor if:    You think your hearing is getting worse.     You have new symptoms, such as dizziness or nausea.   Where can you learn more?  Go to  "https://www.[employer].net/patiented  Enter R798 in the search box to learn more about \"Hearing Loss: Care Instructions.\"  Current as of: September 27, 2023  Content Version: 14.2 2024 [employer].   Care instructions adapted under license by your healthcare professional. If you have questions about a medical condition or this instruction, always ask your healthcare professional. Healthwise, Incorporated disclaims any warranty or liability for your use of this information.    Substance Use Disorder: Care Instructions  Overview     You can improve your life and health by stopping your use of alcohol or drugs. When you don't drink or use drugs, you may feel and sleep better. You may get along better with your family, friends, and coworkers. There are medicines and programs that can help with substance use disorder.  How can you care for yourself at home?  Here are some ways to help you stay sober and prevent relapse.  If you have been given medicine to help keep you sober or reduce your cravings, be sure to take it exactly as prescribed.  Talk to your doctor about programs that can help you stop using drugs or drinking alcohol.  Do not keep alcohol or drugs in your home.  Plan ahead. Think about what you'll say if other people ask you to drink or use drugs. Try not to spend time with people who drink or use drugs.  Use the time and money spent on drinking or drugs to do something that's important to you.  Preventing a relapse  Have a plan to deal with relapse. Learn to recognize changes in your thinking that lead you to drink or use drugs. Get help before you start to drink or use drugs again.  Try to stay away from situations, friends, or places that may lead you to drink or use drugs.  If you feel the need to drink alcohol or use drugs again, seek help right away. Call a trusted friend or family member. Some people get support from organizations such as Narcotics Anonymous or [employer] or from " treatment facilities.  If you relapse, get help as soon as you can. Some people make a plan with another person that outlines what they want that person to do for them if they relapse. The plan usually includes how to handle the relapse and who to notify in case of relapse.  Don't give up. Remember that a relapse doesn't mean that you have failed. Use the experience to learn the triggers that lead you to drink or use drugs. Then quit again. Recovery is a lifelong process. Many people have several relapses before they are able to quit for good.  Follow-up care is a key part of your treatment and safety. Be sure to make and go to all appointments, and call your doctor if you are having problems. It's also a good idea to know your test results and keep a list of the medicines you take.  When should you call for help?   Call 911  anytime you think you may need emergency care. For example, call if you or someone else:    Has overdosed or has withdrawal signs. Be sure to tell the emergency workers that you are or someone else is using or trying to quit using drugs. Overdose or withdrawal signs may include:  Losing consciousness.  Seizure.  Seeing or hearing things that aren't there (hallucinations).     Is thinking or talking about suicide or harming others.   Where to get help 24 hours a day, 7 days a week   If you or someone you know talks about suicide, self-harm, a mental health crisis, a substance use crisis, or any other kind of emotional distress, get help right away. You can:    Call the Suicide and Crisis Lifeline at 988.     Call 9-881-056-TALK (1-866.528.7139).     Text HOME to 917762 to access the Crisis Text Line.   Consider saving these numbers in your phone.  Go to [email or web address] for more information or to chat online.  Call your doctor now or seek immediate medical care if:    You are having withdrawal symptoms. These may include nausea or vomiting, sweating, shakiness, and anxiety.   Watch closely for  "changes in your health, and be sure to contact your doctor if:    You have a relapse.     You need more help or support to stop.   Where can you learn more?  Go to https://www.[employer].net/patiented  Enter H573 in the search box to learn more about \"Substance Use Disorder: Care Instructions.\"  Current as of: November 15, 2023  Content Version: 14.2 2024 [employer].   Care instructions adapted under license by your healthcare professional. If you have questions about a medical condition or this instruction, always ask your healthcare professional. [employer] disclaims any warranty or liability for your use of this information.         Gustavo Hanna is a 69 year old, presenting for the following:  Physical        10/24/2024     8:30 AM   Additional Questions   Roomed by Pinky Patricio CMA   Accompanied by self       HPI    Cardiac history with ablation and 2 heart attacks, last 2019.  Just saw cardiology and is stable.  He is on both plavix and asprin.     Erratic schedule at [employer].    Doesn't plan meals much.  Not much veggies but does some better with fruit.    He lives alone.  He helps his elderly mom a lot.  Does a lot of handSmartmarketman work, welding, transporting people.    Rash - on R shin, more in harp.      No consistent urinary symptoms.      Sleep - using unisom nightly for several years.     Has colonoscopy scheduled for March.  Will be his 1st screen ever.    Health Care Directive  Patient does not have a Health Care Directive: Discussed advance care planning with patient; however, patient declined at this time.      10/24/2024   General Health   How would you rate your overall physical health? Good   Feel stress (tense, anxious, or unable to sleep) Only a little      (!) STRESS CONCERN      10/24/2024   Nutrition   Diet: Regular (no restrictions)    Low fat/cholesterol    Watches eating when he is at home, but eats out couple times a week  Probably half cup yogurt and " some cheese daily.   Multiple values from one day are sorted in reverse-chronological order         10/24/2024   Exercise   Days per week of moderate/strenous exercise 3 days    Elliptical for half hour         10/24/2024   Social Factors   Frequency of gathering with friends or relatives More than three times a week   Worry food won't last until get money to buy more No   Food not last or not have enough money for food? No   Do you have housing? (Housing is defined as stable permanent housing and does not include staying ouside in a car, in a tent, in an abandoned building, in an overnight shelter, or couch-surfing.) Yes   Are you worried about losing your housing? No   Lack of transportation? No   Unable to get utilities (heat,electricity)? No            10/24/2024   Fall Risk   Fallen 2 or more times in the past year? No    Trouble with walking or balance? No        Patient-reported          10/24/2024   Activities of Daily Living- Home Safety   Needs help with the following daily activites None of the above   Safety concerns in the home None of the above            10/24/2024   Dental   Dentist two times every year? Yes            10/24/2024   Hearing Screening   Hearing concerns? (!) IT'S HARD TO FOLLOW A CONVERSATION IN A NOISY RESTAURANT OR CROWDED ROOM.    (!) TROUBLE UNDERSTANDING SOFT OR WHISPERED SPEECH.       Multiple values from one day are sorted in reverse-chronological order         10/24/2024   Driving Risk Screening   Patient/family members have concerns about driving No          10/24/2024   General Alertness/Fatigue Screening   Have you been more tired than usual lately? No          10/24/2024   Urinary Incontinence Screening   Bothered by leaking urine in past 6 months No          10/24/2024   TB Screening   Were you born outside of the [place]? No      Today's PHQ-2 Score:       5/28/2024     8:44 AM   PHQ-2 ( 1999 [employer])   Q1: Little interest or pleasure in doing things 0   Q2: Feeling down,  depressed or hopeless 0   PHQ-2 Score 0         10/24/2024   Substance Use   Alcohol more than 3/day or more than 7/wk Not Applicable   Do you have a current opioid prescription? No   How severe/bad is pain from 1 to 10? 1/10   Do you use any other substances recreationally? (!) OTHER      Social History     Tobacco Use    Smoking status: Former    Smokeless tobacco: Never    Tobacco comments:     quit 30+ years ago   Substance Use Topics    Alcohol use: No    Drug use: No     Last PSA:   PSA   Date Value Ref Range Status   11/20/2019 2.79 0 - 4 ug/L Final     Comment:     Assay Method:  Chemiluminescence using Siemens Vista analyzer     ASCVD Risk   The ASCVD Risk score (Hola STEVENS, et al., 2019) failed to calculate for the following reasons:    Risk score cannot be calculated because patient has a medical history suggesting prior/existing ASCVD    Reviewed and updated as needed this visit by Provider                    Current providers sharing in care for this patient include:  Patient Care Team:  Celia Lu PA-C as PCP - General (Family Medicine)  Celia Lu PA-C as Assigned PCP  Alvaro Caballero MD as Assigned Heart and Vascular Provider    The following health maintenance items are reviewed in Epic and correct as of today:  Health Maintenance   Topic Date Due    ZOSTER IMMUNIZATION (2 of 3) 07/22/2014    RSV VACCINE (1 - Risk 60-74 years 1-dose series) Never done    DTAP/TDAP/TD IMMUNIZATION (2 - Td or Tdap) 02/26/2023    MEDICARE ANNUAL WELLNESS VISIT  01/04/2024    ANNUAL REVIEW OF HM ORDERS  01/04/2024    INFLUENZA VACCINE (1) 09/01/2024    COVID-19 Vaccine (5 - 2024-25 season) 09/01/2024    BMP  05/28/2025    LIPID  05/28/2025    FALL RISK ASSESSMENT  10/24/2025    GLUCOSE  05/28/2027    ADVANCE CARE PLANNING  01/04/2028    COLORECTAL CANCER SCREENING  03/07/2033    HEPATITIS C SCREENING  Completed    PHQ-2 (once per calendar year)  Completed    Pneumococcal  "Vaccine: 65+ Years  Completed    AORTIC ANEURYSM SCREENING (SYSTEM ASSIGNED)  Completed    HPV IMMUNIZATION  Aged Out    MENINGITIS IMMUNIZATION  Aged Out    RSV MONOCLONAL ANTIBODY  Aged Out    LUNG CANCER SCREENING  Discontinued        Objective    Exam  BP (!) 150/80   Pulse 54   Temp 97.6  F (36.4  C) (Tympanic)   Resp 18   Ht 1.638 m (5' 4.5\")   Wt 75.8 kg (167 lb)   SpO2 98%   BMI 28.22 kg/m     Estimated body mass index is 28.22 kg/m  as calculated from the following:    Height as of this encounter: 1.638 m (5' 4.5\").    Weight as of this encounter: 75.8 kg (167 lb).    Physical Exam         10/24/2024   Mini Cog   Clock Draw Score 2 Normal   3 Item Recall 2 objects recalled   Mini Cog Total Score 4             Signed Electronically by: Celia Lu PA-C  "

## 2024-10-25 NOTE — RESULT ENCOUNTER NOTE
Ross  A1c test shows continued prediabetes, slightly better than it was a few months ago.  Your calcium level entirely came back to normal.  Other labs normal.  Still make sure you get enough calcium in your diet.  Let me know if you have questions.  Celia

## 2025-01-30 ENCOUNTER — NURSE TRIAGE (OUTPATIENT)
Dept: FAMILY MEDICINE | Facility: CLINIC | Age: 70
End: 2025-01-30
Payer: COMMERCIAL

## 2025-01-30 NOTE — TELEPHONE ENCOUNTER
"Reason for Disposition   Patient wants to be seen    Additional Information   Negative: Swelling of scrotum and has not previously been diagnosed with a hernia   Negative: SEVERE abdominal pain   Negative: Hernia is painful or tender to touch   Negative: Vomiting and can't reduce the hernia   Negative: Vomiting and abdomen looks much more swollen than usual   Negative: Vomiting and hernia is more painful or swollen than usual   Negative: Swollen lump in groin and pulsating (like heartbeat)   Negative: Patient sounds very sick or weak to the triager   Negative: Constant abdominal pain and present > 2 hours (NO pain or tenderness of hernia)   Negative: Can't reduce the hernia (NO pain, local tenderness, or vomiting)    Answer Assessment - Initial Assessment Questions  1. ONSET:  \"When did this first appear?\"      2-3 months ago  2. APPEARANCE: \"What does it look like?\"      Puffed up  3. SIZE: \"How big is it?\" (inches, cm or compare to coins, fruit)      2\" round  4. LOCATION: \"Where exactly is the hernia located?\"      Groin  5. PATTERN: \"Does the swelling come and go, or has it been constant since it started?\"      Comes and goes. When I lift things  6. PAIN: \"Is there any pain?\" If Yes, ask: \"How bad is it?\"  (Scale 1-10; or mild, moderate, severe)      Dull pain  7. DIAGNOSIS: \"Have you been seen by a doctor (or NP/PA) for this?\" \"Did the doctor diagnose you as having a hernia?\"      No  8. OTHER SYMPTOMS: \"Do you have any other symptoms?\" (e.g., fever, abdomen pain, vomiting)      No  9. PREGNANCY: \"Is there any chance you are pregnant?\" \"When was your last menstrual period?\"      N/A    Protocols used: Hernia-A-OH    Jayant KAUFMAN RN  [facility]    "

## 2025-02-03 ENCOUNTER — OFFICE VISIT (OUTPATIENT)
Dept: FAMILY MEDICINE | Facility: CLINIC | Age: 70
End: 2025-02-03
Payer: COMMERCIAL

## 2025-02-03 VITALS
HEART RATE: 65 BPM | OXYGEN SATURATION: 97 % | DIASTOLIC BLOOD PRESSURE: 68 MMHG | SYSTOLIC BLOOD PRESSURE: 134 MMHG | RESPIRATION RATE: 16 BRPM | BODY MASS INDEX: 26.63 KG/M2 | TEMPERATURE: 98.2 F | HEIGHT: 64 IN | WEIGHT: 156 LBS

## 2025-02-03 DIAGNOSIS — K40.90 RIGHT INGUINAL HERNIA: Primary | ICD-10-CM

## 2025-02-03 PROCEDURE — 99213 OFFICE O/P EST LOW 20 MIN: CPT | Performed by: PHYSICIAN ASSISTANT

## 2025-02-03 ASSESSMENT — PATIENT HEALTH QUESTIONNAIRE - PHQ9
SUM OF ALL RESPONSES TO PHQ QUESTIONS 1-9: 4
SUM OF ALL RESPONSES TO PHQ QUESTIONS 1-9: 4
10. IF YOU CHECKED OFF ANY PROBLEMS, HOW DIFFICULT HAVE THESE PROBLEMS MADE IT FOR YOU TO DO YOUR WORK, TAKE CARE OF THINGS AT HOME, OR GET ALONG WITH OTHER PEOPLE: NOT DIFFICULT AT ALL

## 2025-02-03 ASSESSMENT — PAIN SCALES - GENERAL: PAINLEVEL_OUTOF10: NO PAIN (0)

## 2025-02-03 NOTE — NURSING NOTE
"Chief Complaint   Patient presents with    Hernia       Initial /68 (BP Location: Right arm, Patient Position: Sitting, Cuff Size: Adult Regular)   Pulse 65   Temp 98.2  F (36.8  C)   Resp 16   Ht 1.638 m (5' 4.49\")   Wt 70.8 kg (156 lb)   SpO2 97%   BMI 26.37 kg/m   Estimated body mass index is 26.37 kg/m  as calculated from the following:    Height as of this encounter: 1.638 m (5' 4.49\").    Weight as of this encounter: 70.8 kg (156 lb).    Patient presents to the clinic using No DME    Is there anyone who you would like to be able to receive your results? No  If yes have patient fill out AYAKA      "

## 2025-02-03 NOTE — PROGRESS NOTES
"  Assessment & Plan     Right inguinal hernia  Indirect  - Adult Gen Surg  Referral; Future    Patient Instructions   Referral to surgeon  Discussed covid, tetanus, shingles vaccine options     Subjective   Jacques is a 69 year old, presenting for the following health issues:  Hernia        2/3/2025     2:33 PM   Additional Questions   Roomed by Rosemary CABRALES CMA     History of Present Illness       Reason for visit:  Preop  Symptom onset:  3-4 weeks ago  Symptoms include:  Hernia  Symptom intensity:  Moderate  Symptom progression:  Worsening  Had these symptoms before:  No  What makes it worse:  Activity  What makes it better:  Rest He is missing 1 dose(s) of medications per week.  He is not taking prescribed medications regularly due to remembering to take.     Started around time of deer hunting.  Has been much more active this month and has been feeling it more.  Has been able to self reduce.        Objective    /68 (BP Location: Right arm, Patient Position: Sitting, Cuff Size: Adult Regular)   Pulse 65   Temp 98.2  F (36.8  C)   Resp 16   Ht 1.638 m (5' 4.49\")   Wt 70.8 kg (156 lb)   SpO2 97%   BMI 26.37 kg/m    Body mass index is 26.37 kg/m .  Physical Exam   Indirect right inguinal hernia felt on exam.  Patient declined offer of chaperone.        Signed Electronically by: Celia Lu PA-C    "

## 2025-02-05 ENCOUNTER — TELEPHONE (OUTPATIENT)
Dept: SURGERY | Facility: CLINIC | Age: 70
End: 2025-02-05

## 2025-02-05 ENCOUNTER — OFFICE VISIT (OUTPATIENT)
Dept: SURGERY | Facility: CLINIC | Age: 70
End: 2025-02-05
Attending: PHYSICIAN ASSISTANT
Payer: COMMERCIAL

## 2025-02-05 VITALS
HEART RATE: 50 BPM | DIASTOLIC BLOOD PRESSURE: 77 MMHG | SYSTOLIC BLOOD PRESSURE: 172 MMHG | WEIGHT: 156.09 LBS | TEMPERATURE: 96.5 F | BODY MASS INDEX: 26.65 KG/M2 | HEIGHT: 64 IN

## 2025-02-05 DIAGNOSIS — I25.2 HISTORY OF MI (MYOCARDIAL INFARCTION): ICD-10-CM

## 2025-02-05 DIAGNOSIS — I25.10 CORONARY ARTERY DISEASE INVOLVING NATIVE CORONARY ARTERY OF NATIVE HEART WITHOUT ANGINA PECTORIS: ICD-10-CM

## 2025-02-05 DIAGNOSIS — K40.90 RIGHT INGUINAL HERNIA: Primary | ICD-10-CM

## 2025-02-05 PROCEDURE — 99204 OFFICE O/P NEW MOD 45 MIN: CPT | Performed by: SURGERY

## 2025-02-05 NOTE — LETTER
2/5/2025      Jacques Sharp  [address] [place] [address]      Dear Colleague,    Thank you for referring your patient, Jacques Sharp, to the [facility]. Please see a copy of my visit note below.      Assessment & Plan  Problem List Items Addressed This Visit          Circulatory    Coronary artery disease involving native coronary artery of native heart without angina pectoris     Other Visit Diagnoses       Right inguinal hernia    -  Primary    Relevant Orders    Case Request: HERNIORRHAPHY, INGUINAL, ROBOT-ASSISTED, LAPAROSCOPIC, USING DA ISAK XI (Completed)    History of MI (myocardial infarction), 2013, 2019        stents 4x; plavix           70 yo M with right inguinal hernia (likely direct)  The patient was thoroughly counseled regarding Right inguinal hernia [K40.90].     The patient was informed that the proposed procedure or medical intervention involves reduction and repair with or without mesh and does offer a very good likelihood of symptom relief. We also discussed the options of repairing the hernia laparoscopically, robotically, versus open. I recommend robotic inguinal hernia repair.     The patient was made aware of the risks of the procedure, including but not limited to:  nerve entrapment or injury, persistence of pain (10%), injury to the bowel/bladder, infertility, ischemic orchitis (rare), Injury to the vas deferens (rare), hematoma, mesh migration, mesh infection, cardiac or pulmonary complication and anesthesia related complications also that difficulties may be encountered during recovery to include: wound infection, recurrence (5-10%), seroma, hematoma and chronic pain.     In the course of the evaluation we did discuss other therapeutic options with the patient, including continued watchful waiting. The risks and benefits of these options were also discussed which include but are not limited to: incarceration and/or strangulation..     Also discussed were  possible problems or difficulties the patient may encounter if treatment was not pursued at this time.     The patient was informed [facility] will be [facility] responsible for the procedure. Assistance during the procedure and during hospitalization may also be provided by other physicians, nurses and technicians.       We discussed risk factors of mesh infection -including uncontrolled diabetes, tobacco/nicotine use, immune suppressed medication usage, morbid obesity.  If the mesh becomes infected, it will need to come out surgically.  Then patient will need another hernia repair once here she recovers.    The patient will be provided additional education resources by the support staff. If there are ever any questions regarding their diagnosis or the procedure, the patient is encouraged to ask.     All of the patient s or their legal representative s questions have been answered to their satisfaction and they have indicated a clear understanding of this discussion.   Jacques expressed understanding of risks, benefits and alternatives and wished to proceed.     All findings, test results, and diagnosis were discussed with the patient. Jacques  participated in the decision making process and agreed with the plan of care. Questions were sought and answered.     Face to Face/patient Contact total time: 20 minutes  Pre Charting time: 10 minutes; Post charting time, communication and other activities: 10 minutes;   Total time:  40 minutes        No follow-ups on file.      Subjective  Jacques is a 69 year old, presenting for the following health issues:  Consult (Right inguinal hernia)    Right inguinal hernia the past few months  Noted a small bulge especially with increased activity  No pain but definitely a reducible bulge  No numbness, no tingling  More urinary frequency   No signs of obstruction  Never had abdominal surgery  Hx of MI x3 in 2013 and 2019 - does have 4 stents (plavix and baby ASA)  Last cardiology  "was a few months and said he's \"fine\"  Quit smoking in his mid 20s.              Review of Systems  Constitutional, neuro, ENT, endocrine, pulmonary, cardiac, gastrointestinal, genitourinary, musculoskeletal, integument and psychiatric systems are negative, except as otherwise noted.      Objective   BP (!) 172/77 (BP Location: Right arm, Patient Position: Sitting, Cuff Size: Adult Regular)   Pulse 50   Temp (!) 96.5  F (35.8  C) (Tympanic)   Ht 1.638 m (5' 4.49\")   Wt 70.8 kg (156 lb 1.4 oz)   BMI 26.39 kg/m    Body mass index is 26.39 kg/m .  Physical Exam  Vitals reviewed. Exam conducted with a chaperone present.   HENT:      Nose: Nose normal.   Eyes:      Conjunctiva/sclera: Conjunctivae normal.   Pulmonary:      Effort: Pulmonary effort is normal.   Abdominal:       Skin:     General: Skin is warm.      Capillary Refill: Capillary refill takes less than 2 seconds.   Neurological:      General: No focal deficit present.      Mental Status: He is alert.   Psychiatric:         Mood and Affect: Mood normal.                Signed Electronically by: Artemio Langley MD        Again, thank you for allowing me to participate in the care of your patient.        Sincerely,        Artemio Langley MD    Electronically signed"

## 2025-02-05 NOTE — PROGRESS NOTES
Assessment & Plan   Problem List Items Addressed This Visit          Circulatory    Coronary artery disease involving native coronary artery of native heart without angina pectoris     Other Visit Diagnoses       Right inguinal hernia    -  Primary    Relevant Orders    Case Request: HERNIORRHAPHY, INGUINAL, ROBOT-ASSISTED, LAPAROSCOPIC, USING DA ISAK XI (Completed)    History of MI (myocardial infarction), 2013, 2019        stents 4x; plavix           70 yo M with right inguinal hernia (likely direct)  The patient was thoroughly counseled regarding Right inguinal hernia [K40.90].     The patient was informed that the proposed procedure or medical intervention involves reduction and repair with or without mesh and does offer a very good likelihood of symptom relief. We also discussed the options of repairing the hernia laparoscopically, robotically, versus open. I recommend robotic inguinal hernia repair.     The patient was made aware of the risks of the procedure, including but not limited to:  nerve entrapment or injury, persistence of pain (10%), injury to the bowel/bladder, infertility, ischemic orchitis (rare), Injury to the vas deferens (rare), hematoma, mesh migration, mesh infection, cardiac or pulmonary complication and anesthesia related complications also that difficulties may be encountered during recovery to include: wound infection, recurrence (5-10%), seroma, hematoma and chronic pain.     In the course of the evaluation we did discuss other therapeutic options with the patient, including continued watchful waiting. The risks and benefits of these options were also discussed which include but are not limited to: incarceration and/or strangulation..     Also discussed were possible problems or difficulties the patient may encounter if treatment was not pursued at this time.     The patient was informed that [facility] the procedure. Assistance during the  "procedure and during hospitalization may also be provided by other physicians, nurses and technicians.       We discussed risk factors of mesh infection -including uncontrolled diabetes, tobacco/nicotine use, immune suppressed medication usage, morbid obesity.  If the mesh becomes infected, it will need to come out surgically.  Then patient will need another hernia repair once here she recovers.    The patient will be provided additional education resources by the support staff. If there are ever any questions regarding their diagnosis or the procedure, the patient is encouraged to ask.     All of the patient s or their legal representative s questions have been answered to their satisfaction and they have indicated a clear understanding of this discussion.   Jacques expressed understanding of risks, benefits and alternatives and wished to proceed.     All findings, test results, and diagnosis were discussed with the patient. Jacques  participated in the decision making process and agreed with the plan of care. Questions were sought and answered.     Face to Face/patient Contact total time: 20 minutes  Pre Charting time: 10 minutes; Post charting time, communication and other activities: 10 minutes;   Total time:  40 minutes        No follow-ups on file.      Subjective   Jacques is a 69 year old, presenting for the following health issues:  Consult (Right inguinal hernia)    Right inguinal hernia the past few months  Noted a small bulge especially with increased activity  No pain but definitely a reducible bulge  No numbness, no tingling  More urinary frequency   No signs of obstruction  Never had abdominal surgery  Hx of MI x3 in 2013 and 2019 - does have 4 stents (plavix and baby ASA)  Last cardiology was a few months and said he's \"fine\"  Quit smoking in his mid 20s.              Review of Systems  Constitutional, neuro, ENT, endocrine, pulmonary, cardiac, gastrointestinal, genitourinary, musculoskeletal, integument and " "psychiatric systems are negative, except as otherwise noted.      Objective    BP (!) 172/77 (BP Location: Right arm, Patient Position: Sitting, Cuff Size: Adult Regular)   Pulse 50   Temp (!) 96.5  F (35.8  C) (Tympanic)   Ht 1.638 m (5' 4.49\")   Wt 70.8 kg (156 lb 1.4 oz)   BMI 26.39 kg/m    Body mass index is 26.39 kg/m .  Physical Exam  Vitals reviewed. Exam conducted with a chaperone present.   HENT:      Nose: Nose normal.   Eyes:      Conjunctiva/sclera: Conjunctivae normal.   Pulmonary:      Effort: Pulmonary effort is normal.   Abdominal:       Skin:     General: Skin is warm.      Capillary Refill: Capillary refill takes less than 2 seconds.   Neurological:      General: No focal deficit present.      Mental Status: He is alert.   Psychiatric:         Mood and Affect: Mood normal.                Signed Electronically by: Artemio Langley MD    "

## 2025-02-05 NOTE — NURSING NOTE
"Initial BP (!) 172/77 (BP Location: Right arm, Patient Position: Sitting, Cuff Size: Adult Regular)   Pulse 50   Temp (!) 96.5  F (35.8  C) (Tympanic)   Ht 1.638 m (5' 4.49\")   Wt 70.8 kg (156 lb 1.4 oz)   BMI 26.39 kg/m   Estimated body mass index is 26.39 kg/m  as calculated from the following:    Height as of this encounter: 1.638 m (5' 4.49\").    Weight as of this encounter: 70.8 kg (156 lb 1.4 oz). .  Martha Lopez MA    "

## 2025-02-05 NOTE — TELEPHONE ENCOUNTER
Type of surgery: Hernia Robot-assisted  Location of surgery: [facility]  Date and time of surgery: 3/13  Surgeon: Korin  Pre-Op Appt Date: 2/19  Post-Op Appt Date: 3/25  Packet sent out: Yes helen antony per pt   Pre-cert/Authorization completed:  Not Applicable  Date: na

## 2025-02-19 ENCOUNTER — MYC MEDICAL ADVICE (OUTPATIENT)
Dept: FAMILY MEDICINE | Facility: CLINIC | Age: 70
End: 2025-02-19

## 2025-02-19 ENCOUNTER — OFFICE VISIT (OUTPATIENT)
Dept: FAMILY MEDICINE | Facility: CLINIC | Age: 70
End: 2025-02-19
Payer: COMMERCIAL

## 2025-02-19 VITALS
HEIGHT: 64 IN | RESPIRATION RATE: 16 BRPM | TEMPERATURE: 98.1 F | OXYGEN SATURATION: 99 % | BODY MASS INDEX: 26.63 KG/M2 | DIASTOLIC BLOOD PRESSURE: 75 MMHG | WEIGHT: 156 LBS | SYSTOLIC BLOOD PRESSURE: 131 MMHG | HEART RATE: 63 BPM

## 2025-02-19 DIAGNOSIS — Z23 HIGH PRIORITY FOR 2019-NCOV VACCINE: ICD-10-CM

## 2025-02-19 DIAGNOSIS — Z01.818 PREOP GENERAL PHYSICAL EXAM: Primary | ICD-10-CM

## 2025-02-19 DIAGNOSIS — I25.10 CORONARY ARTERY DISEASE INVOLVING NATIVE CORONARY ARTERY OF NATIVE HEART WITHOUT ANGINA PECTORIS: ICD-10-CM

## 2025-02-19 DIAGNOSIS — K40.90 RIGHT INGUINAL HERNIA: ICD-10-CM

## 2025-02-19 PROCEDURE — 91320 SARSCV2 VAC 30MCG TRS-SUC IM: CPT | Performed by: PHYSICIAN ASSISTANT

## 2025-02-19 PROCEDURE — 99214 OFFICE O/P EST MOD 30 MIN: CPT | Mod: 25 | Performed by: PHYSICIAN ASSISTANT

## 2025-02-19 PROCEDURE — 90480 ADMN SARSCOV2 VAC 1/ONLY CMP: CPT | Performed by: PHYSICIAN ASSISTANT

## 2025-02-19 PROCEDURE — 93000 ELECTROCARDIOGRAM COMPLETE: CPT | Performed by: PHYSICIAN ASSISTANT

## 2025-02-19 ASSESSMENT — PAIN SCALES - GENERAL: PAINLEVEL_OUTOF10: NO PAIN (0)

## 2025-02-19 NOTE — PROGRESS NOTES
Preoperative Evaluation  [facility]  [address] [place] [id number]  Phone: [phone]  Fax: [phone]  Primary Provider: Celia Lu PA-C  Pre-op Performing Provider: Celia Lu PA-C  Feb 19, 2025 2/19/2025   Surgical Information   What procedure is being done? hernia repair   Facility or Hospital where procedure/surgery will be performed: [facility]   Who is doing the procedure / surgery? dr millan   Date of surgery / procedure: march 13   Time of surgery / procedure: unknown   Where do you plan to recover after surgery? at home alone     Fax number for surgical facility: Note does not need to be faxed, will be available electronically in Epic.    Assessment & Plan     The proposed surgical procedure is considered INTERMEDIATE risk.    Preop general physical exam  Right inguinal hernia  - EKG 12-lead complete w/read - Clinics    Coronary artery disease involving native coronary artery of native heart without angina pectoris  Asymptomatic, works out most days 30 min on elliptical, with HR in 130s.  - EKG 12-lead complete w/read - Clinics    Risks and Recommendations  The patient has the following additional risks and recommendations for perioperative complications:   - No identified additional risk factors other than previously addressed    Recommendation  Approval given to proceed with proposed procedure, without further diagnostic evaluation.    Pending approval from cardiology (staff message sent) he will hold aspirin and plavix for 1 wk prior to surgery.    ADDENDUM 2/19/25 1:18 pm  - Received correspondence back from cardiology okaying patient to hold plavix for 7 days but recommending that he NOT hold aspirin.  Patient will be updated.  END ADDENDUM     Gustavo Hanna is a 69 year old, presenting for the following:  Pre-Op Exam          2/3/2025     2:33 PM   Additional Questions   Roomed by JALIL Nice related to  upcoming procedure: symptomatic right inguinal hernia         2/19/2025   Pre-Op Questionnaire   Have you ever had a heart attack or stroke? (!) YES - 2013 AV ablation with postop NSTEMI   Have you ever had surgery on your heart or blood vessels, such as a stent placement, a coronary artery bypass, or surgery on an artery in your head, neck, heart, or legs? (!) YES - as above    Do you have chest pain with activity? No   Do you have a history of heart failure? No    Do you currently have a cold, bronchitis or symptoms of other infection? No   Do you have a cough, shortness of breath, or wheezing? No   Do you or anyone in your family have previous history of blood clots? No   Do you or does anyone in your family have a serious bleeding problem such as prolonged bleeding following surgeries or cuts? No   Have you ever had problems with anemia or been told to take iron pills? No   Have you had any abnormal blood loss such as black, tarry or bloody stools? No   Have you ever had a blood transfusion? No   Are you willing to have a blood transfusion if it is medically needed before, during, or after your surgery? Yes   Have you or any of your relatives ever had problems with anesthesia? No   Do you have sleep apnea, excessive snoring or daytime drowsiness? No   Do you have any artifical heart valves or other implanted medical devices like a pacemaker, defibrillator, or continuous glucose monitor? No   Do you have artificial joints? (!) YES   Are you allergic to latex? No     Health Care Directive  Patient does not have a Health Care Directive: Discussed advance care planning with patient; information given to patient to review.    Preoperative Review of    reviewed - no record of controlled substances prescribed.      Status of Chronic Conditions:  See problem list for active medical problems.  Problems all longstanding and stable, except as noted/documented.  See ROS for pertinent symptoms related to these  conditions.    Patient Active Problem List    Diagnosis Date Noted    Bilateral ocular hypertension 10/24/2024     Priority: Medium    Coronary artery disease involving native coronary artery of native heart without angina pectoris 09/17/2019     Priority: Medium     In December 2013 he underwent ablation for AV node reentry tachycardia.  He had a postop inferior STEMI with subsequent drug-eluting stent to the RCA and then staged intervention of the OM 2, with another stent placed.  He was also noted to have atrial tachycardia originating near the his node and no ablation was pursued.  He has been on beta blocker therapy since then.        September 2019 he presented to [facility] with inferior STEMI, troponin 47.  Angiogram showed 100% mid RCA lesion, 80% RPDA, 60% third RPL, 45% proximal to mid LAD, 25% proximal circumflex.  This was a late stent thrombosis of the distal RCA, repeat drug-eluting stents placed.  Echo showed EF 55%, inferior and inferolateral akinesis, normal RV, no valve disease.  Per clarification with cardiology 1/4/23 - He had a late stent thrombosis, plan was to keep him on aspirin and clopidogrel indefinitely.          Paroxysmal supraventricular tachycardia 02/19/2014     Priority: Medium     12/26/13:SVT RFA performed. Two foci were noted during his EP study, however only one was ablated as the second site was very close to the HIS bundle.  Complicated by MI after the procedure.         Hyperlipidemia LDL goal <70 02/26/2013     Priority: Medium      Past Medical History:   Diagnosis Date    Acute MI (H) 12/24/2013    Basal cell carcinoma     Epididymitis      Past Surgical History:   Procedure Laterality Date    COLONOSCOPY N/A 3/7/2023    Procedure: COLONOSCOPY, FLEXIBLE, WITH LESION REMOVAL USING SNARE;  Surgeon: Jose Baker MD;  Location: [facility]    CORONARY ANGIOGRAPHY ADULT ORDER  1/30/14, 12/24/13    CV CORONARY ANGIOGRAM N/A 9/17/2019    Procedure: Coronary Angiogram;  Surgeon:  Hoang Lozada MD;  Location:  [facility]ATH LAB    EP ABLATION / EP STUDIES  12/23/13    H ABLATION SVT  12/23/13    LUNG SURGERY      benign cyst removed.     VASECTOMY       Current Outpatient Medications   Medication Sig Dispense Refill    acetaminophen (TYLENOL) 325 MG tablet Take 1-2 tablets (325-650 mg) by mouth every 4 hours as needed 100 tablet     aspirin (ASA) 81 MG chewable tablet CHEW AND SWALLOW ONE TABLET BY MOUTH ONCE DAILY 90 tablet 3    atorvastatin (LIPITOR) 80 MG tablet Take 1 tablet (80 mg) by mouth daily 90 tablet 3    clopidogrel (PLAVIX) 75 MG tablet Take 1 tablet (75 mg) by mouth daily 90 tablet 3    metoprolol succinate ER (TOPROL XL) 50 MG 24 hr tablet Take 1 tablet (50 mg) by mouth daily 90 tablet 3    nitroGLYcerin (NITROSTAT) 0.4 MG sublingual tablet Place 1 tablet (0.4 mg) under the tongue every 5 minutes as needed for chest pain DO NOT USE WITHIN 24 HOURS OF SILDENAFIL 60 tablet 0    sildenafil (REVATIO) 20 MG tablet Take 1 tablet (20 mg) by mouth daily as needed (erectile dysfunction) DO NOT USE WITHIN 24 HOURS OF NITROGLYCERIN 25 tablet 3    traZODone (DESYREL) 50 MG tablet Take 0.5-1 tablets (25-50 mg) by mouth at bedtime. 90 tablet 3    triamcinolone (KENALOG) 0.1 % external cream Apply topically 2 times daily as needed (rash on right shin). 15 g 3       Allergies   Allergen Reactions    Sulfa Antibiotics         Social History     Tobacco Use    Smoking status: Former    Smokeless tobacco: Never    Tobacco comments:     quit 30+ years ago   Substance Use Topics    Alcohol use: No     Family History   Problem Relation Age of Onset    Hypertension Mother     Hyperlipidemia Mother     Cancer Father         lung    Lymphoma Sister     Obesity Sister         and prediabetes    Hyperlipidemia Sister     Unknown/Adopted Brother         gets sick a lot but not really diagnosed.    No Known Problems Brother     Cancer - colorectal No family hx of     Prostate Cancer No family  "hx of      History   Drug Use No             Review of Systems  Constitutional, HEENT, cardiovascular, pulmonary, GI, , musculoskeletal, neuro, skin, endocrine and psych systems are negative, except as otherwise noted.  CHRONIC TINNITUS, NONPULSATILE AND UNCHANGED.  WEARS GLASSES.      Objective    Ht 1.638 m (5' 4.49\")   BMI 26.39 kg/m     Estimated body mass index is 26.39 kg/m  as calculated from the following:    Height as of this encounter: 1.638 m (5' 4.49\").    Weight as of 2/5/25: 70.8 kg (156 lb 1.4 oz).  Physical Exam  GENERAL: alert and no distress  EYES: Eyes grossly normal to inspection, PERRL and conjunctivae and sclerae normal  HENT: ear canals and TM's normal, nose and mouth without ulcers or lesions  NECK: no adenopathy, no asymmetry, masses, or scars  RESP: lungs clear to auscultation - no rales, rhonchi or wheezes  CV: regular rate and rhythm, normal S1 S2, no S3 or S4, no murmur, click or rub, no peripheral edema  ABDOMEN: soft, nontender, no hepatosplenomegaly, no masses and bowel sounds normal  MS: no gross musculoskeletal defects noted, no edema  SKIN: no suspicious lesions or rashes  NEURO: Normal strength and tone, mentation intact and speech normal  PSYCH: mentation appears normal, affect normal/bright    Recent Labs   Lab Test 10/24/24  1015 05/28/24  0921   HGB  --  15.2   PLT  --  214    136   POTASSIUM 4.9 5.0   CR 0.91 0.92   A1C 5.9* 6.1*        Diagnostics  No labs were ordered during this visit.   EKG required for known coronary heart disease and not completed in the last 90 days.     Revised Cardiac Risk Index (RCRI)  The patient has the following serious cardiovascular risks for perioperative complications:   - Coronary Artery Disease (MI, positive stress test, angina, Qs on EKG) = 1 point     RCRI Interpretation: 1 point: Class II (low risk - 0.9% complication rate)         Signed Electronically by: eClia Lu PA-C  A copy of this evaluation report is " provided to the requesting physician.    Patient Instructions   Stop aspirin 1 wk prior to surgery  I think we will also stop plavix but will reach out to your cardiologist about this     Patient Education  Preparing for Your Surgery  For Adults  Getting started  In most cases, a nurse will call to review your health history and instructions. They will give you an arrival time based on your scheduled surgery time. Please be ready to share:  Your doctor's clinic name and phone number  Your medical, surgical, and anesthesia history  A list of allergies and sensitivities  A list of medicines, including herbal treatments and over-the-counter drugs  Whether the patient has a legal guardian (ask how to send us the papers in advance)  Note: You may not receive a call if you were seen at our PAC (Preoperative Assessment Center).  Please tell us if you're pregnant--or if there's any chance you might be pregnant. Some surgeries may injure a fetus (unborn baby), so they require a pregnancy test. Surgeries that are safe for a fetus don't always need a test, and you can choose whether to have one.   Preparing for surgery  Within 10 to 30 days of surgery: Have a pre-op exam (sometimes called an H&P, or History and Physical). This can be done at a clinic or pre-operative center.  If you're having a , you may not need this exam. Talk to your care team.  At your pre-op exam, talk to your care team about all medicines you take. (This includes CBD oil and any drugs, such as THC, marijuana, and other forms of cannabis.) If you need to stop any medicine before surgery, ask when to start taking it again.  This is for your safety. Many medicines and drugs can make you bleed too much during surgery. Some change how well surgery (anesthesia) drugs work.  Call your insurance company to let them know you're having surgery. (If you don't have insurance, call [phone].)  Call your clinic if there's any change in your health. This  includes a scrape or scratch near the surgery site, or any signs of a cold (sore throat, runny nose, cough, rash, fever).  Eating and drinking guidelines  For your safety: Unless your surgeon tells you otherwise, follow the guidelines below.  Eat and drink as normal until 8 hours before you arrive for surgery. After that, no food or milk. You can spit out gum when you arrive.  Drink clear liquids until 2 hours before you arrive. These are liquids you can see through, like water, Gatorade, and Propel Water. They also include plain black coffee and tea (no cream or milk).  No alcohol for 24 hours before you arrive. The night before surgery, stop any drinks that contain THC.  If your care team tells you to take medicine on the morning of surgery, it's okay to take it with a sip of water. No other medicines or drugs are allowed (including CBD oil)--follow your care team's instructions.  If you have questions the day of surgery, call your hospital or surgery center.   Preventing infection  Shower or bathe the night before and the morning of surgery. Follow the instructions your clinic gave you. (If no instructions, use regular soap.)  Don't shave or clip hair near your surgery site. We'll remove the hair if needed.  Don't smoke or vape the morning of surgery. No chewing tobacco for 6 hours before you arrive. A nicotine patch is okay. You may spit out nicotine gum when you arrive.  For some surgeries, the surgeon will tell you to fully quit smoking and nicotine.  We will make every effort to keep you safe from infection. We will:  Clean our hands often with soap and water (or an alcohol-based hand rub).  Clean the skin at your surgery site with a special soap that kills germs.  Give you a special gown to keep you warm. (Cold raises the risk of infection.)  Wear hair covers, masks, gowns, and gloves during surgery.  Give antibiotic medicine, if prescribed. Not all surgeries need this medicine.  What to bring on the day of  surgery  Photo ID and insurance card  Copy of your health care directive, if you have one  Glasses and hearing aids (bring cases)  You can't wear contacts during surgery  Inhaler and eye drops, if you use them (tell us about these when you arrive)  CPAP machine or breathing device, if you use them  A few personal items, if spending the night  If you have . . .  A pacemaker, ICD (cardiac defibrillator), or other implant: Bring the ID card.  An implanted stimulator: Bring the remote control.  A legal guardian: Bring a copy of the certified (court-stamped) guardianship papers.  Please remove any jewelry, including body piercings. Leave jewelry and other valuables at home.  If you're going home the day of surgery  You must have a responsible adult drive you home. They should stay with you overnight as well.  If you don't have someone to stay with you, and you aren't safe to go home alone, we may keep you overnight. Insurance often won't pay for this.  After surgery  If it's hard to control your pain or you need more pain medicine, please call your surgeon's office.  Questions?   If you have any questions for your care team, list them here:   ____________________________________________________________________________________________________________________________________________________________________________________________________________________________________________________________  For informational purposes only. Not to replace the advice of your health care provider. Copyright   2003, 2019 [facility]. All rights reserved. Clinically reviewed by Sundeep Garner MD. [employer] [id number] - REV 08/24.

## 2025-02-19 NOTE — PATIENT INSTRUCTIONS
Stop aspirin 1 wk prior to surgery  I think we will also stop plavix but will reach out to your cardiologist about this     Patient Education   Preparing for Your Surgery  For Adults  Getting started  In most cases, a nurse will call to review your health history and instructions. They will give you an arrival time based on your scheduled surgery time. Please be ready to share:  Your doctor's clinic name and phone number  Your medical, surgical, and anesthesia history  A list of allergies and sensitivities  A list of medicines, including herbal treatments and over-the-counter drugs  Whether the patient has a legal guardian (ask how to send us the papers in advance)  Note: You may not receive a call if you were seen at our PAC (Preoperative Assessment Center).  Please tell us if you're pregnant--or if there's any chance you might be pregnant. Some surgeries may injure a fetus (unborn baby), so they require a pregnancy test. Surgeries that are safe for a fetus don't always need a test, and you can choose whether to have one.   Preparing for surgery  Within 10 to 30 days of surgery: Have a pre-op exam (sometimes called an H&P, or History and Physical). This can be done at a clinic or pre-operative center.  If you're having a , you may not need this exam. Talk to your care team.  At your pre-op exam, talk to your care team about all medicines you take. (This includes CBD oil and any drugs, such as THC, marijuana, and other forms of cannabis.) If you need to stop any medicine before surgery, ask when to start taking it again.  This is for your safety. Many medicines and drugs can make you bleed too much during surgery. Some change how well surgery (anesthesia) drugs work.  Call your insurance company to let them know you're having surgery. (If you don't have insurance, call [phone].)  Call your clinic if there's any change in your health. This includes a scrape or scratch near the surgery site, or any signs  of a cold (sore throat, runny nose, cough, rash, fever).  Eating and drinking guidelines  For your safety: Unless your surgeon tells you otherwise, follow the guidelines below.  Eat and drink as normal until 8 hours before you arrive for surgery. After that, no food or milk. You can spit out gum when you arrive.  Drink clear liquids until 2 hours before you arrive. These are liquids you can see through, like water, Gatorade, and Propel Water. They also include plain black coffee and tea (no cream or milk).  No alcohol for 24 hours before you arrive. The night before surgery, stop any drinks that contain THC.  If your care team tells you to take medicine on the morning of surgery, it's okay to take it with a sip of water. No other medicines or drugs are allowed (including CBD oil)--follow your care team's instructions.  If you have questions the day of surgery, call your hospital or surgery center.   Preventing infection  Shower or bathe the night before and the morning of surgery. Follow the instructions your clinic gave you. (If no instructions, use regular soap.)  Don't shave or clip hair near your surgery site. We'll remove the hair if needed.  Don't smoke or vape the morning of surgery. No chewing tobacco for 6 hours before you arrive. A nicotine patch is okay. You may spit out nicotine gum when you arrive.  For some surgeries, the surgeon will tell you to fully quit smoking and nicotine.  We will make every effort to keep you safe from infection. We will:  Clean our hands often with soap and water (or an alcohol-based hand rub).  Clean the skin at your surgery site with a special soap that kills germs.  Give you a special gown to keep you warm. (Cold raises the risk of infection.)  Wear hair covers, masks, gowns, and gloves during surgery.  Give antibiotic medicine, if prescribed. Not all surgeries need this medicine.  What to bring on the day of surgery  Photo ID and insurance card  Copy of your health care  directive, if you have one  Glasses and hearing aids (bring cases)  You can't wear contacts during surgery  Inhaler and eye drops, if you use them (tell us about these when you arrive)  CPAP machine or breathing device, if you use them  A few personal items, if spending the night  If you have . . .  A pacemaker, ICD (cardiac defibrillator), or other implant: Bring the ID card.  An implanted stimulator: Bring the remote control.  A legal guardian: Bring a copy of the certified (court-stamped) guardianship papers.  Please remove any jewelry, including body piercings. Leave jewelry and other valuables at home.  If you're going home the day of surgery  You must have a responsible adult drive you home. They should stay with you overnight as well.  If you don't have someone to stay with you, and you aren't safe to go home alone, we may keep you overnight. Insurance often won't pay for this.  After surgery  If it's hard to control your pain or you need more pain medicine, please call your surgeon's office.  Questions?   If you have any questions for your care team, list them here:   ____________________________________________________________________________________________________________________________________________________________________________________________________________________________________________________________  For informational purposes only. Not to replace the advice of your health care provider. Copyright   2003, 2019 [employer] [facility]. All rights reserved. Clinically reviewed by Sundeep Garner MD. [employer] [id number] - REV 08/24.

## 2025-02-19 NOTE — NURSING NOTE
"Chief Complaint   Patient presents with    Pre-Op Exam       Initial Ht 1.638 m (5' 4.49\")   BMI 26.39 kg/m   Estimated body mass index is 26.39 kg/m  as calculated from the following:    Height as of this encounter: 1.638 m (5' 4.49\").    Weight as of 2/5/25: 70.8 kg (156 lb 1.4 oz).    Patient presents to the clinic using No DME    Is there anyone who you would like to be able to receive your results? No  If yes have patient fill out [id number]      "

## 2025-02-19 NOTE — H&P (VIEW-ONLY)
Preoperative Evaluation  [facility] [place]  [address] [place] [address]  Phone: [phone]  Fax: [phone]  Primary Provider: Celia Lu PA-C  Pre-op Performing Provider: Celia Lu PA-C  Feb 19, 2025 2/19/2025   Surgical Information   What procedure is being done? hernia repair   Facility or Hospital where procedure/surgery will be performed: [facility]   Who is doing the procedure / surgery? dr millan   Date of surgery / procedure: march 13   Time of surgery / procedure: unknown   Where do you plan to recover after surgery? at home alone     Fax number for surgical facility: Note does not need to be faxed, will be available electronically in Epic.    Assessment & Plan     The proposed surgical procedure is considered INTERMEDIATE risk.    Preop general physical exam  Right inguinal hernia  - EKG 12-lead complete w/read - Clinics    Coronary artery disease involving native coronary artery of native heart without angina pectoris  Asymptomatic, works out most days 30 min on elliptical, with HR in 130s.  - EKG 12-lead complete w/read - Clinics    Risks and Recommendations  The patient has the following additional risks and recommendations for perioperative complications:   - No identified additional risk factors other than previously addressed    Recommendation  Approval given to proceed with proposed procedure, without further diagnostic evaluation.    Pending approval from cardiology (staff message sent) he will hold aspirin and plavix for 1 wk prior to surgery.    ADDENDUM 2/19/25 1:18 pm  - Received correspondence back from cardiology okaying patient to hold plavix for 7 days but recommending that he NOT hold aspirin.  Patient will be updated.  END ADDENDUM     Gustavo Hanna is a 69 year old, presenting for the following:  Pre-Op Exam          2/3/2025     2:33 PM   Additional Questions   Roomed by JALIL Nice related to  upcoming procedure: symptomatic right inguinal hernia         2/19/2025   Pre-Op Questionnaire   Have you ever had a heart attack or stroke? (!) YES - 2013 AV ablation with postop NSTEMI   Have you ever had surgery on your heart or blood vessels, such as a stent placement, a coronary artery bypass, or surgery on an artery in your head, neck, heart, or legs? (!) YES - as above    Do you have chest pain with activity? No   Do you have a history of heart failure? No    Do you currently have a cold, bronchitis or symptoms of other infection? No   Do you have a cough, shortness of breath, or wheezing? No   Do you or anyone in your family have previous history of blood clots? No   Do you or does anyone in your family have a serious bleeding problem such as prolonged bleeding following surgeries or cuts? No   Have you ever had problems with anemia or been told to take iron pills? No   Have you had any abnormal blood loss such as black, tarry or bloody stools? No   Have you ever had a blood transfusion? No   Are you willing to have a blood transfusion if it is medically needed before, during, or after your surgery? Yes   Have you or any of your relatives ever had problems with anesthesia? No   Do you have sleep apnea, excessive snoring or daytime drowsiness? No   Do you have any artifical heart valves or other implanted medical devices like a pacemaker, defibrillator, or continuous glucose monitor? No   Do you have artificial joints? (!) YES   Are you allergic to latex? No     Health Care Directive  Patient does not have a Health Care Directive: Discussed advance care planning with patient; information given to patient to review.    Preoperative Review of    reviewed - no record of controlled substances prescribed.      Status of Chronic Conditions:  See problem list for active medical problems.  Problems all longstanding and stable, except as noted/documented.  See ROS for pertinent symptoms related to these  conditions.    Patient Active Problem List    Diagnosis Date Noted    Bilateral ocular hypertension 10/24/2024     Priority: Medium    Coronary artery disease involving native coronary artery of native heart without angina pectoris 09/17/2019     Priority: Medium     In December 2013 he underwent ablation for AV node reentry tachycardia.  He had a postop inferior STEMI with subsequent drug-eluting stent to the RCA and then staged intervention of the OM 2, with another stent placed.  He was also noted to have atrial tachycardia originating near the his node and no ablation was pursued.  He has been on beta blocker therapy since then.        September 2019 he [facility] to [facility] with inferior STEMI, troponin 47.  Angiogram showed 100% mid RCA lesion, 80% RPDA, 60% third RPL, 45% proximal to mid LAD, 25% proximal circumflex.  This was a late stent thrombosis of the distal RCA, repeat drug-eluting stents placed.  Echo showed EF 55%, inferior and inferolateral akinesis, normal RV, no valve disease.  Per clarification with cardiology 1/4/23 - He had a late stent thrombosis, plan was to keep him on aspirin and clopidogrel indefinitely.          Paroxysmal supraventricular tachycardia 02/19/2014     Priority: Medium     12/26/13:SVT RFA performed. Two foci were noted during his EP study, however only one was ablated as the second site was very close to the HIS bundle.  Complicated by MI after the procedure.         Hyperlipidemia LDL goal <70 02/26/2013     Priority: Medium      Past Medical History:   Diagnosis Date    Acute MI (H) 12/24/2013    Basal cell carcinoma     Epididymitis      Past Surgical History:   Procedure Laterality Date    COLONOSCOPY N/A 3/7/2023    Procedure: COLONOSCOPY, FLEXIBLE, WITH LESION REMOVAL USING SNARE;  Surgeon: Jose Baker MD;  Location: [facility]    CORONARY ANGIOGRAPHY ADULT ORDER  1/30/14, 12/24/13    CV CORONARY ANGIOGRAM N/A 9/17/2019    Procedure: Coronary Angiogram;  Surgeon:  Hoang Lozada MD;  Location:  [facility]ARDIAC CATH LAB    EP ABLATION / EP STUDIES  12/23/13    H ABLATION SVT  12/23/13    LUNG SURGERY      benign cyst removed.     VASECTOMY       Current Outpatient Medications   Medication Sig Dispense Refill    acetaminophen (TYLENOL) 325 MG tablet Take 1-2 tablets (325-650 mg) by mouth every 4 hours as needed 100 tablet     aspirin (ASA) 81 MG chewable tablet CHEW AND SWALLOW ONE TABLET BY MOUTH ONCE DAILY 90 tablet 3    atorvastatin (LIPITOR) 80 MG tablet Take 1 tablet (80 mg) by mouth daily 90 tablet 3    clopidogrel (PLAVIX) 75 MG tablet Take 1 tablet (75 mg) by mouth daily 90 tablet 3    metoprolol succinate ER (TOPROL XL) 50 MG 24 hr tablet Take 1 tablet (50 mg) by mouth daily 90 tablet 3    nitroGLYcerin (NITROSTAT) 0.4 MG sublingual tablet Place 1 tablet (0.4 mg) under the tongue every 5 minutes as needed for chest pain DO NOT USE WITHIN 24 HOURS OF SILDENAFIL 60 tablet 0    sildenafil (REVATIO) 20 MG tablet Take 1 tablet (20 mg) by mouth daily as needed (erectile dysfunction) DO NOT USE WITHIN 24 HOURS OF NITROGLYCERIN 25 tablet 3    traZODone (DESYREL) 50 MG tablet Take 0.5-1 tablets (25-50 mg) by mouth at bedtime. 90 tablet 3    triamcinolone (KENALOG) 0.1 % external cream Apply topically 2 times daily as needed (rash on right shin). 15 g 3       Allergies   Allergen Reactions    Sulfa Antibiotics         Social History     Tobacco Use    Smoking status: Former    Smokeless tobacco: Never    Tobacco comments:     quit 30+ years ago   Substance Use Topics    Alcohol use: No     Family History   Problem Relation Age of Onset    Hypertension Mother     Hyperlipidemia Mother     Cancer Father         lung    Lymphoma Sister     Obesity Sister         and prediabetes    Hyperlipidemia Sister     Unknown/Adopted Brother         gets sick a lot but not really diagnosed.    No Known Problems Brother     Cancer - colorectal No family hx of     Prostate Cancer No family  "hx of      History   Drug Use No             Review of Systems  Constitutional, HEENT, cardiovascular, pulmonary, GI, , musculoskeletal, neuro, skin, endocrine and psych systems are negative, except as otherwise noted.  CHRONIC TINNITUS, NONPULSATILE AND UNCHANGED.  WEARS GLASSES.      Objective    Ht 1.638 m (5' 4.49\")   BMI 26.39 kg/m     Estimated body mass index is 26.39 kg/m  as calculated from the following:    Height as of this encounter: 1.638 m (5' 4.49\").    Weight as of 2/5/25: 70.8 kg (156 lb 1.4 oz).  Physical Exam  GENERAL: alert and no distress  EYES: Eyes grossly normal to inspection, PERRL and conjunctivae and sclerae normal  HENT: ear canals and TM's normal, nose and mouth without ulcers or lesions  NECK: no adenopathy, no asymmetry, masses, or scars  RESP: lungs clear to auscultation - no rales, rhonchi or wheezes  CV: regular rate and rhythm, normal S1 S2, no S3 or S4, no murmur, click or rub, no peripheral edema  ABDOMEN: soft, nontender, no hepatosplenomegaly, no masses and bowel sounds normal  MS: no gross musculoskeletal defects noted, no edema  SKIN: no suspicious lesions or rashes  NEURO: Normal strength and tone, mentation intact and speech normal  PSYCH: mentation appears normal, affect normal/bright    Recent Labs   Lab Test 10/24/24  1015 05/28/24  0921   HGB  --  15.2   PLT  --  214    136   POTASSIUM 4.9 5.0   CR 0.91 0.92   A1C 5.9* 6.1*        Diagnostics  No labs were ordered during this visit.   EKG required for known coronary heart disease and not completed in the last 90 days.     Revised Cardiac Risk Index (RCRI)  The patient has the following serious cardiovascular risks for perioperative complications:   - Coronary Artery Disease (MI, positive stress test, angina, Qs on EKG) = 1 point     RCRI Interpretation: 1 point: Class II (low risk - 0.9% complication rate)         Signed Electronically by: Celia Lu PA-C  A copy of this evaluation report is " provided to the requesting physician.    Patient Instructions   Stop aspirin 1 wk prior to surgery  I think we will also stop plavix but will reach out to your cardiologist about this     Patient Education  Preparing for Your Surgery  For Adults  Getting started  In most cases, a nurse will call to review your health history and instructions. They will give you an arrival time based on your scheduled surgery time. Please be ready to share:  Your doctor's clinic name and phone number  Your medical, surgical, and anesthesia history  A list of allergies and sensitivities  A list of medicines, including herbal treatments and over-the-counter drugs  Whether the patient has a legal guardian (ask how to send us the papers in advance)  Note: You may not receive a call if you were seen at our PAC (Preoperative Assessment Center).  Please tell us if you're pregnant--or if there's any chance you might be pregnant. Some surgeries may injure a fetus (unborn baby), so they require a pregnancy test. Surgeries that are safe for a fetus don't always need a test, and you can choose whether to have one.   Preparing for surgery  Within 10 to 30 days of surgery: Have a pre-op exam (sometimes called an H&P, or History and Physical). This can be done at a clinic or pre-operative center.  If you're having a , you may not need this exam. Talk to your care team.  At your pre-op exam, talk to your care team about all medicines you take. (This includes CBD oil and any drugs, such as THC, marijuana, and other forms of cannabis.) If you need to stop any medicine before surgery, ask when to start taking it again.  This is for your safety. Many medicines and drugs can make you bleed too much during surgery. Some change how well surgery (anesthesia) drugs work.  Call your insurance company to let them know you're having surgery. (If you don't have insurance, call [phone].)  Call your clinic if there's any change in your health. This  includes a scrape or scratch near the surgery site, or any signs of a cold (sore throat, runny nose, cough, rash, fever).  Eating and drinking guidelines  For your safety: Unless your surgeon tells you otherwise, follow the guidelines below.  Eat and drink as normal until 8 hours before you arrive for surgery. After that, no food or milk. You can spit out gum when you arrive.  Drink clear liquids until 2 hours before you arrive. These are liquids you can see through, like water, Gatorade, and Propel Water. They also include plain black coffee and tea (no cream or milk).  No alcohol for 24 hours before you arrive. The night before surgery, stop any drinks that contain THC.  If your care team tells you to take medicine on the morning of surgery, it's okay to take it with a sip of water. No other medicines or drugs are allowed (including CBD oil)--follow your care team's instructions.  If you have questions the day of surgery, call your hospital or surgery center.   Preventing infection  Shower or bathe the night before and the morning of surgery. Follow the instructions your clinic gave you. (If no instructions, use regular soap.)  Don't shave or clip hair near your surgery site. We'll remove the hair if needed.  Don't smoke or vape the morning of surgery. No chewing tobacco for 6 hours before you arrive. A nicotine patch is okay. You may spit out nicotine gum when you arrive.  For some surgeries, the surgeon will tell you to fully quit smoking and nicotine.  We will make every effort to keep you safe from infection. We will:  Clean our hands often with soap and water (or an alcohol-based hand rub).  Clean the skin at your surgery site with a special soap that kills germs.  Give you a special gown to keep you warm. (Cold raises the risk of infection.)  Wear hair covers, masks, gowns, and gloves during surgery.  Give antibiotic medicine, if prescribed. Not all surgeries need this medicine.  What to bring on the day of  surgery  Photo ID and insurance card  Copy of your health care directive, if you have one  Glasses and hearing aids (bring cases)  You can't wear contacts during surgery  Inhaler and eye drops, if you use them (tell us about these when you arrive)  CPAP machine or breathing device, if you use them  A few personal items, if spending the night  If you have . . .  A pacemaker, ICD (cardiac defibrillator), or other implant: Bring the ID card.  An implanted stimulator: Bring the remote control.  A legal guardian: Bring a copy of the certified (court-stamped) guardianship papers.  Please remove any jewelry, including body piercings. Leave jewelry and other valuables at home.  If you're going home the day of surgery  You must have a responsible adult drive you home. They should stay with you overnight as well.  If you don't have someone to stay with you, and you aren't safe to go home alone, we may keep you overnight. Insurance often won't pay for this.  After surgery  If it's hard to control your pain or you need more pain medicine, please call your surgeon's office.  Questions?   If you have any questions for your care team, list them here:   ____________________________________________________________________________________________________________________________________________________________________________________________________________________________________________________________  For informational purposes only. Not to replace the advice of your health care provider. Copyright   2003, 2019 [facility]. All rights reserved. Clinically reviewed by Sundeep Garner MD. "[employer] [id number] - REV 08/24.

## 2025-03-12 ENCOUNTER — ANESTHESIA EVENT (OUTPATIENT)
Dept: SURGERY | Facility: CLINIC | Age: 70
End: 2025-03-12
Payer: COMMERCIAL

## 2025-03-12 ASSESSMENT — LIFESTYLE VARIABLES: TOBACCO_USE: 1

## 2025-03-12 ASSESSMENT — ENCOUNTER SYMPTOMS: DYSRHYTHMIAS: 1

## 2025-03-12 NOTE — ANESTHESIA PREPROCEDURE EVALUATION
Anesthesia Pre-Procedure Evaluation    Patient: Jacques Sharp   MRN: [id number] : 1955        Procedure : Procedure(s):  HERNIORRHAPHY, INGUINAL, ROBOT-ASSISTED, LAPAROSCOPIC, USING DA ISAK XI          Past Medical History:   Diagnosis Date    Acute MI (H) 2013    Basal cell carcinoma     Epididymitis       Past Surgical History:   Procedure Laterality Date    COLONOSCOPY N/A 3/7/2023    Procedure: COLONOSCOPY, FLEXIBLE, WITH LESION REMOVAL USING SNARE;  Surgeon: Jose Baker MD;  Location: [facility]    CORONARY ANGIOGRAPHY ADULT ORDER  14, 13    CV CORONARY ANGIOGRAM N/A 2019    Procedure: Coronary Angiogram;  Surgeon: Hoang Lozada MD;  Location:  [facility] CARDIAC CATH LAB    EP ABLATION / EP STUDIES  13    H ABLATION SVT  13    LUNG SURGERY      benign cyst removed.     VASECTOMY        Allergies   Allergen Reactions    Sulfa Antibiotics       Social History     Tobacco Use    Smoking status: Former    Smokeless tobacco: Never    Tobacco comments:     quit 30+ years ago   Substance Use Topics    Alcohol use: No      Wt Readings from Last 1 Encounters:   25 70.8 kg (156 lb)        Anesthesia Evaluation            ROS/MED HX  ENT/Pulmonary:     (+)                tobacco use, Past use,                       Neurologic:       Cardiovascular: Comment: SVT    (+) Dyslipidemia - -  CAD - past MI - stent-   Taking blood thinners                     dysrhythmias, Other,        Previous cardiac testing   Echo: Date: 19 Results:  Interpretation Summary  Left ventricular size is normal.  The Ejection Fraction is estimated at 55-60%.  Inferior wall akinesis is present.  Inferolateral (posterior) wall akinesis is present.  Right ventricular function, chamber size, wall motion, and thickness are  normal.  No significant valve disease.  IVC diameter <2.1 cm collapsing >50% with sniff suggests a normal RA pressure  of 3 mmHg.  The peak velocity of the tricuspid  regurgitant jet is not obtainable.  Dilatation of aortic root (3.9 cm, 2.2 cm/m2).    Stress Test:  Date: Results:    ECG Reviewed:  Date: 2/19/25 Results:  Sinus Bradycardia -frequent ectopic ventricular beats   # VECs = 2  -Right bundle branch block.  -Atypical T axis -possible acute process.  Cath:  Date: 9/17/19 Results:    Mid RCA to Dist RCA lesion is 100% stenosed.    RPDA lesion is 80% stenosed.    3rd RPL lesion is 60% stenosed.    Prox LAD to Mid LAD lesion is 45% stenosed.    Ost Cx to Prox Cx lesion is 25% stenosed.     Impression and plan: Late late stent thrombosis of the distal RCA.     Successful PCI of the distal RCA, ostial PDA using synergy drug-eluting stent and PTCA of the PL branch     Recommendation: Lifelong dual antiplatelet therapy.      METS/Exercise Tolerance:     Hematologic:       Musculoskeletal:       GI/Hepatic:       Renal/Genitourinary:       Endo:       Psychiatric/Substance Use:       Infectious Disease:       Malignancy:   (+) Malignancy, History of Skin.    Other: Comment: Bilateral ocular hypertension             Physical Exam    Airway        Mallampati: I   TM distance: > 3 FB   Neck ROM: full   Mouth opening: > 3 cm    Respiratory Devices and Support         Dental       (+) Minor Abnormalities - some fillings, tiny chips      Cardiovascular   cardiovascular exam normal          Pulmonary   pulmonary exam normal                OUTSIDE LABS:  CBC:   Lab Results   Component Value Date    WBC 4.9 05/28/2024    WBC 5.4 09/09/2022    HGB 15.2 05/28/2024    HGB 13.0 (L) 09/09/2022    HCT 47.2 05/28/2024    HCT 39.0 (L) 09/09/2022     05/28/2024     09/09/2022     BMP:   Lab Results   Component Value Date     10/24/2024     05/28/2024    POTASSIUM 4.9 10/24/2024    POTASSIUM 5.0 05/28/2024    CHLORIDE 105 10/24/2024    CHLORIDE 101 05/28/2024    CO2 27 10/24/2024    CO2 24 05/28/2024    BUN 17.2 10/24/2024    BUN 26.8 (H) 05/28/2024    CR 0.91 10/24/2024  "   CR 0.92 05/28/2024     (H) 10/24/2024    GLC 99 05/28/2024     COAGS:   Lab Results   Component Value Date    PTT 33 01/30/2014    INR 1.11 01/30/2014     POC:   Lab Results   Component Value Date     (H) 09/17/2019     HEPATIC:   Lab Results   Component Value Date    ALBUMIN 3.8 09/21/2020    PROTTOTAL 7.2 09/21/2020    ALT 38 09/21/2020    AST 26 09/21/2020    ALKPHOS 59 09/21/2020    BILITOTAL 0.7 09/21/2020     OTHER:   Lab Results   Component Value Date    A1C 5.9 (H) 10/24/2024    YRIS 9.4 10/24/2024    PHOS 4.2 09/17/2019    MAG 2.1 09/18/2019    TSH 1.34 09/21/2020    CRP <2.9 06/11/2015    SED 5 06/11/2015       Anesthesia Plan    ASA Status:  3       Anesthesia Type: General.     - Airway: ETT   Induction: Intravenous.   Maintenance: Balanced.        Consents    Anesthesia Plan(s) and associated risks, benefits, and realistic alternatives discussed. Questions answered and patient/representative(s) expressed understanding.     - Discussed: Risks, Benefits and Alternatives for BOTH SEDATION and the PROCEDURE were discussed     - Discussed with:  Patient            Postoperative Care       PONV prophylaxis: Background Propofol Infusion     Comments:               JOSE JUAN Giles CRNA    I have reviewed the pertinent notes and labs in the chart from the past 30 days and (re)examined the patient.  Any updates or changes from those notes are reflected in this note.    Clinically Significant Risk Factors Present on Admission                             # Overweight: Estimated body mass index is 26.37 kg/m  as calculated from the following:    Height as of 2/19/25: 1.638 m (5' 4.49\").    Weight as of 2/19/25: 70.8 kg (156 lb).                "

## 2025-03-13 ENCOUNTER — HOSPITAL ENCOUNTER (OUTPATIENT)
Facility: CLINIC | Age: 70
Discharge: HOME | End: 2025-03-13
Attending: SURGERY | Admitting: SURGERY
Payer: COMMERCIAL

## 2025-03-13 ENCOUNTER — ANESTHESIA (OUTPATIENT)
Dept: SURGERY | Facility: CLINIC | Age: 70
End: 2025-03-13
Payer: COMMERCIAL

## 2025-03-13 VITALS
DIASTOLIC BLOOD PRESSURE: 61 MMHG | OXYGEN SATURATION: 98 % | TEMPERATURE: 97.3 F | RESPIRATION RATE: 18 BRPM | SYSTOLIC BLOOD PRESSURE: 145 MMHG | HEART RATE: 57 BPM

## 2025-03-13 DIAGNOSIS — Z87.19 S/P RIGHT INGUINAL HERNIA REPAIR: Primary | ICD-10-CM

## 2025-03-13 DIAGNOSIS — Z98.890 S/P RIGHT INGUINAL HERNIA REPAIR: Primary | ICD-10-CM

## 2025-03-13 PROCEDURE — 710N000009 HC RECOVERY PHASE 1, LEVEL 1, PER MIN: Performed by: SURGERY

## 2025-03-13 PROCEDURE — 49650 LAP ING HERNIA REPAIR INIT: CPT | Mod: RT | Performed by: SURGERY

## 2025-03-13 PROCEDURE — 258N000003 HC RX IP 258 OP 636: Performed by: NURSE ANESTHETIST, CERTIFIED REGISTERED

## 2025-03-13 PROCEDURE — 999N000141 HC STATISTIC PRE-PROCEDURE NURSING ASSESSMENT: Performed by: SURGERY

## 2025-03-13 PROCEDURE — 250N000009 HC RX 250: Performed by: SURGERY

## 2025-03-13 PROCEDURE — 258N000003 HC RX IP 258 OP 636

## 2025-03-13 PROCEDURE — 250N000009 HC RX 250: Performed by: NURSE ANESTHETIST, CERTIFIED REGISTERED

## 2025-03-13 PROCEDURE — 250N000011 HC RX IP 250 OP 636: Performed by: NURSE ANESTHETIST, CERTIFIED REGISTERED

## 2025-03-13 PROCEDURE — 272N000001 HC OR GENERAL SUPPLY STERILE: Performed by: SURGERY

## 2025-03-13 PROCEDURE — C1781 MESH (IMPLANTABLE): HCPCS | Performed by: SURGERY

## 2025-03-13 PROCEDURE — 710N000012 HC RECOVERY PHASE 2, PER MINUTE: Performed by: SURGERY

## 2025-03-13 PROCEDURE — 250N000011 HC RX IP 250 OP 636: Performed by: SURGERY

## 2025-03-13 PROCEDURE — 370N000017 HC ANESTHESIA TECHNICAL FEE, PER MIN: Performed by: SURGERY

## 2025-03-13 PROCEDURE — 250N000025 HC SEVOFLURANE, PER MIN: Performed by: SURGERY

## 2025-03-13 PROCEDURE — 250N000013 HC RX MED GY IP 250 OP 250 PS 637: Performed by: SURGERY

## 2025-03-13 PROCEDURE — S2900 ROBOTIC SURGICAL SYSTEM: HCPCS | Performed by: SURGERY

## 2025-03-13 PROCEDURE — 360N000080 HC SURGERY LEVEL 7, PER MIN: Performed by: SURGERY

## 2025-03-13 DEVICE — ANATOMICAL MESH PRE-SHAPED MONOFILAMENT POLYPROPYLENE TEXTILE WITH MARKING;RIGHT SIDE
Type: IMPLANTABLE DEVICE | Site: ABDOMEN | Status: FUNCTIONAL
Brand: DEXTILE

## 2025-03-13 RX ORDER — FENTANYL CITRATE 50 UG/ML
INJECTION, SOLUTION INTRAMUSCULAR; INTRAVENOUS PRN
Status: DISCONTINUED | OUTPATIENT
Start: 2025-03-13 | End: 2025-03-13

## 2025-03-13 RX ORDER — AMOXICILLIN 250 MG
1-2 CAPSULE ORAL 2 TIMES DAILY
Qty: 30 TABLET | Refills: 0 | Status: SHIPPED | OUTPATIENT
Start: 2025-03-13

## 2025-03-13 RX ORDER — CEFAZOLIN SODIUM/WATER 2 G/20 ML
2 SYRINGE (ML) INTRAVENOUS SEE ADMIN INSTRUCTIONS
Status: DISCONTINUED | OUTPATIENT
Start: 2025-03-13 | End: 2025-03-13 | Stop reason: HOSPADM

## 2025-03-13 RX ORDER — ACETAMINOPHEN 325 MG/1
975 TABLET ORAL ONCE
Status: COMPLETED | OUTPATIENT
Start: 2025-03-13 | End: 2025-03-13

## 2025-03-13 RX ORDER — ALBUTEROL SULFATE 0.83 MG/ML
2.5 SOLUTION RESPIRATORY (INHALATION) EVERY 4 HOURS PRN
Status: DISCONTINUED | OUTPATIENT
Start: 2025-03-13 | End: 2025-03-13 | Stop reason: HOSPADM

## 2025-03-13 RX ORDER — DEXAMETHASONE SODIUM PHOSPHATE 4 MG/ML
INJECTION, SOLUTION INTRA-ARTICULAR; INTRALESIONAL; INTRAMUSCULAR; INTRAVENOUS; SOFT TISSUE PRN
Status: DISCONTINUED | OUTPATIENT
Start: 2025-03-13 | End: 2025-03-13

## 2025-03-13 RX ORDER — HEPARIN SODIUM 5000 [USP'U]/.5ML
5000 INJECTION, SOLUTION INTRAVENOUS; SUBCUTANEOUS
Status: COMPLETED | OUTPATIENT
Start: 2025-03-13 | End: 2025-03-13

## 2025-03-13 RX ORDER — CELECOXIB 200 MG/1
400 CAPSULE ORAL ONCE
Status: COMPLETED | OUTPATIENT
Start: 2025-03-13 | End: 2025-03-13

## 2025-03-13 RX ORDER — ONDANSETRON 2 MG/ML
INJECTION INTRAMUSCULAR; INTRAVENOUS PRN
Status: DISCONTINUED | OUTPATIENT
Start: 2025-03-13 | End: 2025-03-13

## 2025-03-13 RX ORDER — SODIUM CHLORIDE, SODIUM LACTATE, POTASSIUM CHLORIDE, CALCIUM CHLORIDE 600; 310; 30; 20 MG/100ML; MG/100ML; MG/100ML; MG/100ML
INJECTION, SOLUTION INTRAVENOUS CONTINUOUS
Status: DISCONTINUED | OUTPATIENT
Start: 2025-03-13 | End: 2025-03-13 | Stop reason: HOSPADM

## 2025-03-13 RX ORDER — OXYCODONE HYDROCHLORIDE 5 MG/1
5 TABLET ORAL EVERY 6 HOURS PRN
Qty: 12 TABLET | Refills: 0 | Status: SHIPPED | OUTPATIENT
Start: 2025-03-13

## 2025-03-13 RX ORDER — GLYCOPYRROLATE 0.2 MG/ML
INJECTION, SOLUTION INTRAMUSCULAR; INTRAVENOUS PRN
Status: DISCONTINUED | OUTPATIENT
Start: 2025-03-13 | End: 2025-03-13

## 2025-03-13 RX ORDER — DEXAMETHASONE SODIUM PHOSPHATE 4 MG/ML
4 INJECTION, SOLUTION INTRA-ARTICULAR; INTRALESIONAL; INTRAMUSCULAR; INTRAVENOUS; SOFT TISSUE
Status: DISCONTINUED | OUTPATIENT
Start: 2025-03-13 | End: 2025-03-13 | Stop reason: HOSPADM

## 2025-03-13 RX ORDER — ONDANSETRON 4 MG/1
4 TABLET, ORALLY DISINTEGRATING ORAL EVERY 30 MIN PRN
Status: DISCONTINUED | OUTPATIENT
Start: 2025-03-13 | End: 2025-03-13 | Stop reason: HOSPADM

## 2025-03-13 RX ORDER — GABAPENTIN 100 MG/1
100 CAPSULE ORAL
Status: DISCONTINUED | OUTPATIENT
Start: 2025-03-13 | End: 2025-03-13

## 2025-03-13 RX ORDER — FENTANYL CITRATE 50 UG/ML
50 INJECTION, SOLUTION INTRAMUSCULAR; INTRAVENOUS EVERY 5 MIN PRN
Status: DISCONTINUED | OUTPATIENT
Start: 2025-03-13 | End: 2025-03-13 | Stop reason: HOSPADM

## 2025-03-13 RX ORDER — ACETAMINOPHEN 325 MG/1
975 TABLET ORAL ONCE
Status: DISCONTINUED | OUTPATIENT
Start: 2025-03-13 | End: 2025-03-13

## 2025-03-13 RX ORDER — LIDOCAINE 40 MG/G
CREAM TOPICAL
Status: DISCONTINUED | OUTPATIENT
Start: 2025-03-13 | End: 2025-03-13 | Stop reason: HOSPADM

## 2025-03-13 RX ORDER — GABAPENTIN 600 MG/1
600 TABLET ORAL ONCE
Status: COMPLETED | OUTPATIENT
Start: 2025-03-13 | End: 2025-03-13

## 2025-03-13 RX ORDER — MEPERIDINE HYDROCHLORIDE 25 MG/ML
12.5 INJECTION INTRAMUSCULAR; INTRAVENOUS; SUBCUTANEOUS EVERY 5 MIN PRN
Status: DISCONTINUED | OUTPATIENT
Start: 2025-03-13 | End: 2025-03-13 | Stop reason: HOSPADM

## 2025-03-13 RX ORDER — KETOROLAC TROMETHAMINE 30 MG/ML
INJECTION, SOLUTION INTRAMUSCULAR; INTRAVENOUS PRN
Status: DISCONTINUED | OUTPATIENT
Start: 2025-03-13 | End: 2025-03-13

## 2025-03-13 RX ORDER — NALOXONE HYDROCHLORIDE 0.4 MG/ML
0.1 INJECTION, SOLUTION INTRAMUSCULAR; INTRAVENOUS; SUBCUTANEOUS
Status: DISCONTINUED | OUTPATIENT
Start: 2025-03-13 | End: 2025-03-13 | Stop reason: HOSPADM

## 2025-03-13 RX ORDER — HYDROMORPHONE HCL IN WATER/PF 6 MG/30 ML
0.4 PATIENT CONTROLLED ANALGESIA SYRINGE INTRAVENOUS EVERY 5 MIN PRN
Status: DISCONTINUED | OUTPATIENT
Start: 2025-03-13 | End: 2025-03-13 | Stop reason: HOSPADM

## 2025-03-13 RX ORDER — PROPOFOL 10 MG/ML
INJECTION, EMULSION INTRAVENOUS PRN
Status: DISCONTINUED | OUTPATIENT
Start: 2025-03-13 | End: 2025-03-13

## 2025-03-13 RX ORDER — BUPIVACAINE HYDROCHLORIDE AND EPINEPHRINE 2.5; 5 MG/ML; UG/ML
INJECTION, SOLUTION EPIDURAL; INFILTRATION; INTRACAUDAL; PERINEURAL PRN
Status: DISCONTINUED | OUTPATIENT
Start: 2025-03-13 | End: 2025-03-13 | Stop reason: HOSPADM

## 2025-03-13 RX ORDER — HYDROXYZINE HYDROCHLORIDE 25 MG/1
25 TABLET, FILM COATED ORAL EVERY 6 HOURS PRN
Status: DISCONTINUED | OUTPATIENT
Start: 2025-03-13 | End: 2025-03-13 | Stop reason: HOSPADM

## 2025-03-13 RX ORDER — HYDRALAZINE HYDROCHLORIDE 20 MG/ML
2.5-5 INJECTION INTRAMUSCULAR; INTRAVENOUS EVERY 10 MIN PRN
Status: DISCONTINUED | OUTPATIENT
Start: 2025-03-13 | End: 2025-03-13 | Stop reason: HOSPADM

## 2025-03-13 RX ORDER — OXYCODONE HYDROCHLORIDE 5 MG/1
5 TABLET ORAL
Status: DISCONTINUED | OUTPATIENT
Start: 2025-03-13 | End: 2025-03-13 | Stop reason: HOSPADM

## 2025-03-13 RX ORDER — CEFAZOLIN SODIUM/WATER 2 G/20 ML
2 SYRINGE (ML) INTRAVENOUS
Status: COMPLETED | OUTPATIENT
Start: 2025-03-13 | End: 2025-03-13

## 2025-03-13 RX ORDER — ONDANSETRON 2 MG/ML
4 INJECTION INTRAMUSCULAR; INTRAVENOUS EVERY 30 MIN PRN
Status: DISCONTINUED | OUTPATIENT
Start: 2025-03-13 | End: 2025-03-13 | Stop reason: HOSPADM

## 2025-03-13 RX ADMIN — HEPARIN SODIUM 5000 UNITS: 10000 INJECTION, SOLUTION INTRAVENOUS; SUBCUTANEOUS at 09:53

## 2025-03-13 RX ADMIN — PROPOFOL 100 MG: 10 INJECTION, EMULSION INTRAVENOUS at 09:40

## 2025-03-13 RX ADMIN — PROPOFOL 200 MCG/KG/MIN: 10 INJECTION, EMULSION INTRAVENOUS at 09:45

## 2025-03-13 RX ADMIN — SODIUM CHLORIDE, POTASSIUM CHLORIDE, SODIUM LACTATE AND CALCIUM CHLORIDE: 600; 310; 30; 20 INJECTION, SOLUTION INTRAVENOUS at 08:37

## 2025-03-13 RX ADMIN — DEXAMETHASONE SODIUM PHOSPHATE 10 MG: 4 INJECTION, SOLUTION INTRA-ARTICULAR; INTRALESIONAL; INTRAMUSCULAR; INTRAVENOUS; SOFT TISSUE at 09:40

## 2025-03-13 RX ADMIN — ACETAMINOPHEN 975 MG: 325 TABLET, FILM COATED ORAL at 08:36

## 2025-03-13 RX ADMIN — PROPOFOL 25 MG: 10 INJECTION, EMULSION INTRAVENOUS at 10:25

## 2025-03-13 RX ADMIN — Medication 2 G: at 09:30

## 2025-03-13 RX ADMIN — GABAPENTIN 600 MG: 600 TABLET, FILM COATED ORAL at 08:41

## 2025-03-13 RX ADMIN — SODIUM CHLORIDE, POTASSIUM CHLORIDE, SODIUM LACTATE AND CALCIUM CHLORIDE: 600; 310; 30; 20 INJECTION, SOLUTION INTRAVENOUS at 10:15

## 2025-03-13 RX ADMIN — KETOROLAC TROMETHAMINE 30 MG: 30 INJECTION, SOLUTION INTRAMUSCULAR at 10:00

## 2025-03-13 RX ADMIN — FENTANYL CITRATE 100 MCG: 50 INJECTION INTRAMUSCULAR; INTRAVENOUS at 09:40

## 2025-03-13 RX ADMIN — MIDAZOLAM 2 MG: 1 INJECTION INTRAMUSCULAR; INTRAVENOUS at 09:31

## 2025-03-13 RX ADMIN — ONDANSETRON 4 MG: 2 INJECTION INTRAMUSCULAR; INTRAVENOUS at 09:40

## 2025-03-13 RX ADMIN — Medication 200 MG: at 10:55

## 2025-03-13 RX ADMIN — ROCURONIUM BROMIDE 50 MG: 50 INJECTION, SOLUTION INTRAVENOUS at 09:40

## 2025-03-13 RX ADMIN — PHENYLEPHRINE HYDROCHLORIDE 100 MCG: 10 INJECTION INTRAVENOUS at 10:00

## 2025-03-13 RX ADMIN — GLYCOPYRROLATE 0.2 MG: 0.2 INJECTION, SOLUTION INTRAMUSCULAR; INTRAVENOUS at 09:37

## 2025-03-13 RX ADMIN — CELECOXIB 400 MG: 200 CAPSULE ORAL at 08:36

## 2025-03-13 RX ADMIN — FENTANYL CITRATE 150 MCG: 50 INJECTION INTRAMUSCULAR; INTRAVENOUS at 09:37

## 2025-03-13 ASSESSMENT — ACTIVITIES OF DAILY LIVING (ADL)
ADLS_ACUITY_SCORE: 23
ADLS_ACUITY_SCORE: 24
ADLS_ACUITY_SCORE: 23

## 2025-03-13 NOTE — ANESTHESIA CARE TRANSFER NOTE
Patient: Jacques BARRETT Aron    Procedure: Procedure(s):  HERNIORRHAPHY, INGUINAL, ROBOT-ASSISTED, LAPAROSCOPIC, USING DA ISAK XI       Diagnosis: Right inguinal hernia [K40.90]  Diagnosis Additional Information: No value filed.    Anesthesia Type:   General     Note:    Oropharynx: oral airway in place  Level of Consciousness: unresponsive      Independent Airway: airway patency satisfactory and stable  Dentition: dentition unchanged  Vital Signs Stable: post-procedure vital signs reviewed and stable  Report to RN Given: handoff report given  Patient transferred to: PACU    Handoff Report: Identifed the Patient, Identified the Reponsible Provider, Reviewed the pertinent medical history, Discussed the surgical course, Reviewed Intra-OP anesthesia mangement and issues during anesthesia, Set expectations for post-procedure period and Allowed opportunity for questions and acknowledgement of understanding      Vitals:  Vitals Value Taken Time   /63 03/13/25 1110   Temp     Pulse 76 03/13/25 1114   Resp 11 03/13/25 1114   SpO2 97 % 03/13/25 1114   Vitals shown include unfiled device data.    Electronically Signed By: JOSE JUAN Wilkins CRNA  March 13, 2025  11:15 AM

## 2025-03-13 NOTE — ANESTHESIA PROCEDURE NOTES
Airway       Patient location during procedure: OR       Procedure Start/Stop Times: 3/13/2025 9:40 AM  Staff -        CRNA: Mode Schneider APRN CRNA       Performed By: CRNAIndications and Patient Condition       Indications for airway management: belinda-procedural       Induction type:intravenous       Mask difficulty assessment: 1 - vent by mask    Final Airway Details       Final airway type: endotracheal airway       Successful airway: ETT - single  Endotracheal Airway Details        ETT size (mm): 7.5       Cuffed: yes       Cuff volume (mL): 7       Successful intubation technique: video laryngoscopy       VL Blade Size: Singleton 3       Grade View of Cords: 1       Adjucts: stylet       Position: Center       Measured from: lips       Secured at (cm): 22       Bite block used: None    Post intubation assessment        Placement verified by: capnometry, equal breath sounds and chest rise        Number of attempts at approach: 1       Number of other approaches attempted: 0       Secured with: commercial tube simon       Ease of procedure: easy       Dentition: Intact    Medication(s) Administered   Medication Administration Time: 3/13/2025 9:40 AM

## 2025-03-13 NOTE — ANESTHESIA POSTPROCEDURE EVALUATION
Patient: Jacques Sharp    Procedure: Procedure(s):  HERNIORRHAPHY, INGUINAL, ROBOT-ASSISTED, LAPAROSCOPIC, USING DA [employer] XI       Anesthesia Type:  General    Note:  Disposition: Outpatient   Postop Pain Control: Uneventful            Sign Out: Well controlled pain   PONV: No   Neuro/Psych: Uneventful            Sign Out: Acceptable/Baseline neuro status   Airway/Respiratory: Uneventful            Sign Out: Acceptable/Baseline resp. status   CV/Hemodynamics: Uneventful            Sign Out: Acceptable CV status; No obvious hypovolemia; No obvious fluid overload   Other NRE: NONE   DID A NON-ROUTINE EVENT OCCUR? No           Last vitals:  Vitals Value Taken Time   /68 03/13/25 1153   Temp 36.4  C (97.6  F) 03/13/25 1150   Pulse 71 03/13/25 1154   Resp 9 03/13/25 1154   SpO2 97 % 03/13/25 1154   Vitals shown include unfiled device data.    Electronically Signed By: JOSE JUAN Wilkins CRNA  March 13, 2025  11:58 AM

## 2025-03-13 NOTE — OP NOTE
[facility]  Operative Note    Pre-operative diagnosis: Right inguinal hernia [K40.90]   Post-operative diagnosis Right indirect inguinal hernia   Procedure: Procedure(s):  HERNIORRHAPHY, INGUINAL, ROBOT-ASSISTED, LAPAROSCOPIC, USING DA ISAK XI   Surgeon: Artemio Langley MD   Assistants(s): Hali Stanton PA-C assistance was required for port placement, bedside robotic instrument exchanges, bedside needs for retraction and suction as needed, introduction/removal of sutures or mesh, and closure     Anesthesia: General    Estimated blood loss: Minimal                Drains: None   Specimens: None   Implants: [id number] Dextile, right   Findings: Large right inguinal hernia, indirect.  .   Complications: none   Condition: Stable       Indications for the procedure:   This is a 69M with right inguinal hernia.  Patient is symptomatic and would like it to be repaired.  We discussed risks, benefits, and alternatives of the procedure during the office visit.  Patient agreed and wishes to proceed with the procedures above.    Description of procedure:  The patient was preoperatively identified. Consent was signed and placed on the chart. She/he was brought back to the operative suite where he was laid supine on the operating table. General endotracheal anesthesia was induced per anesthesia protocol. She/he was then prepped and draped in sterile fashion. We started by infiltrating 5 cc of local anesthetic in the left upper quadrant area and made small skin incision and accessing the abdominal cavity by using Optiview trocar and 5-mm trocar site visualizing all layers of the abdominal wall until we reached the peritoneal cavity. We then insufflated  with CO2 gas to create pneumoperitoneum.  Brief glance of the abdomen noted tiny left inguinal hernia with obvious right inguinal hernia.  An 8mm robotic trocar was then placed at the RUQ.  An 8mm robotic camera port was also placed just lateral to the midline  supraumbilical. A final 8 mm robotic trocar on the left upper quadrant in place of the 5mm port.  All these were placed under direct visualization and also after local anesthetic to the skin.  Patient was then placed in Trendelenburg position until was able to see the hernia adequately.  The [employer] [place] robot was then docked.    I start my dissection on the right.  The previously marked iliac crests was then palpated by my first assist.  Began taking down the peritoneum lateral to medial utilizing the monopolar scissors and Cadiere forceps; started approximately 6 cm above the area of the inguinal hernia.  Dissected this bluntly and also with monopolar until I saw the pubic symphysis and Tyler's ligament medially.  I was cognizant of the bladder just below the pubic symphysis.  Once I was 1 to 2 cm inferior to the pubic symphysis, and continued the dissection more laterally.  Made my lateral space until the level of the iliac crest.  I then reduce the peritoneum from the hernia.  The peritoneum was reduced until I am able to see the iliac vessels and the psoas muscle. I checked for any cord lipoma and there was none.  I then have a full view of my myopectineal space -noted there was right hernia.      I then place a right [id number] Dextile mesh within the space made on the right inguinal area.  I sutured the medial aspect of the mesh onto Tyler's ligament utilizing a 2-0 Vicryl.  I also tacked the lateral aspect of the mesh far away from the nerves utilizing a 2-0 Vicryl.      At this point I close the peritoneal flap utilizing a 2-0V lock to complete my procedure.  Any rents made in the peritoneum was closed with a 3-0 Vicryl in a figure-of-eight fashion.      At this point brief glance of the abdomen was noted.  Noted hemostasis was achieved.  Then removed all needles through the 8 mm robotic arm under direct visualization.  And the robot was then undocked.     All skin incisions were closed with 4-0 Monocryl in a  simple interrupted buried fashion.  Area was then cleaned and dried.  Skin glue then applied.      Patient tolerated the procedure well.  All instruments, needles, lap counts were correct at the end of the case x2.            [facility] Langley, , NINOOS

## 2025-03-13 NOTE — INTERVAL H&P NOTE
"I have reviewed the surgical (or preoperative) H&P that is linked to this encounter, and examined the patient. There are no significant changes    Right inguinal hernia; MIx3; 4 stents (plavix and ASA)    Clinical Conditions Present on Arrival:  Clinically Significant Risk Factors Present on Admission                  # Drug Induced Platelet Defect: home medication list includes an antiplatelet medication      # Overweight: Estimated body mass index is 26.37 kg/m  as calculated from the following:    Height as of 2/19/25: 1.638 m (5' 4.49\").    Weight as of 2/19/25: 70.8 kg (156 lb).       "

## 2025-03-13 NOTE — DISCHARGE INSTRUCTIONS
Home Care Following Hernia Repair    [facility] Langley, DO      Hernia Type:  Inguinal    It is not uncommon to have urinary retention after an inguinal hernia repair.  This is a condition where your bladder cannot relax therefore you cannot urinate after surgery.  If this continues to persist several hours after surgery, please go to the ER for further evaluation.  Please have the ER placed a Kim catheter and leave in the bladder for at least 5 days or until your follow-up.  Pain meds:   600mg ibuprofen every 6hrs prn   650mg of acetaminophen every 6hrs prn  You can alternate these meds so that you take one every 3 hrs.    Make sure you do not go over 4000mg of acetaminophen every 24hrs, especially if you are taking Norco or percocet 5/325mg.    Care of the Incision:  Remove gauze dressing (if present) after 48 hours.  If surgical glue was used, keep your incision dry for 24 hours.  Then you may shower, but don t submerge under water for at least 2 weeks.  Gently pat your incision dry with a freshly laundered towel.  Do not touch your incision with bare hands or pick at scabs.  Leave your incision open to air.  Cover it only if clothing rubs or irritates it.  Remove the gauze and clear tape from the belly button 48 hrs after surgery, if present.   Wear your abdominal binder, if given, while awake for the first 2-4 weeks.   Activity:  Gradually increase your activity.  Walk short distances several times each day and increase the distance as your strength allows.  To promote circulation, do not cross your legs while sitting.  No strenuous lifting or straining for 2-3 weeks.   Do not lift anything over 10-20 pounds until your doctor approves an increase.  Return to work will be determined by the type of work you do and should be discussed with your physician.  Do not drive or operate equipment while taking prescription pain medicines.  You may drive 1 week after surgery if you have stopped taking prescription pain  medicines and are pain-free enough to react quickly and make an emergency stop if necessary.    Diet:  Return to the diet you were on before surgery.  Drink plenty of  water.  Avoid foods that cause constipation.    REMEMBER--most prescription pain pills cause constipation.  Walking, extra fluids, and increased fiber (fresh fruits and vegetables, etc.) are natural remedies for constipation.  You can also take mineral oil, 1-2 Tablespoons per day.  If still constipated you may try a stool softener such as Colace or Miralax.    Call Your Physician if You Have:  Redness, increased swelling or cloudy drainage from your incision.  A temperature of more than 101 degrees F.  Worsening pain in your incision not relieved by your prescription pain pills and/or a short rest.  Any questions or concerns about your recovery, please call  [facility] at [phone]      Follow-up Care:  Make an appointment 1-2 week after your surgery.  Call [facility] at [phone]                          Same Day Surgery Discharge Instructions  Special Precautions After Surgery - Adult    It is not unusual to feel lightheaded or faint, up to 24 hours after surgery or while taking pain medication.  If you have these symptoms; sit for a few minutes before standing and have someone assist you when getting up.  You should rest and relax for the next 24 hours and must have someone stay with you for at least 24 hours after your discharge.  DO NOT DRIVE any vehicle or operate mechanical equipment for 24 hours following the end of your surgery.  DO NOT DRIVE while taking narcotic pain medications that have been prescribed by your physician.  If you had a limb operated on, you must be able to use it fully to drive.  DO NOT drink alcoholic beverages for 24 hours following surgery or while taking prescription pain medication.  Drink clear liquids (apple juice, ginger ale, broth, 7-Up, etc.).  Progress to your regular  diet as you feel able.  Any questions call your physician and do not make important decisions for 24 hours.    Nausea and Vomiting: Nausea and vomiting can occur any time after receiving anesthesia. If you experience nausea and vomiting we encourage you to move to a clear liquid diet and advance your diet as tolerated. If nausea and vomiting do not improve within 12 hours please call the surgeon or present to the Emergency department.     Break-through Bleeding: If your experience bleeding from your surgical site apply pressure and additional dressing per nurse instruction. For simple problems such as a saturated dressing, you may need to reinforce the dressing with more gauze and tape and put slight pressure on the site. If bleeding does not subside contact the surgeon or present to the Emergency Department.    Post-op Infection: If you develop a fever of 100.4 or greater, have pus like drainage, redness, swelling or severe pain at the surgical site not alleviated with pain medications; please contact the surgeon or present to the Emergency Department.     Medications:  Acetaminophen (Tylenol):  Next dose: 2:30.  Oxycodone :  Next dose: as needed.  Follow the instructions on the bottle.  __________________________________________________________________________________________________________________________________  IMPORTANT NUMBERS:    [facility] [place]  [phone], [phone]  Pharmacy:  [phone]  Same Day Surgery:  [phone], for general post-op questions call Monday - Thursday until 8:30 p.m., Fridays until 6:00 p.m.   Mental Health Mobile Crisis line: [phone]                                                                      General Surgery:  [phone]  Specialty [facility]: [phone]

## 2025-03-25 ENCOUNTER — OFFICE VISIT (OUTPATIENT)
Dept: SURGERY | Facility: CLINIC | Age: 70
End: 2025-03-25
Payer: COMMERCIAL

## 2025-03-25 VITALS — SYSTOLIC BLOOD PRESSURE: 131 MMHG | RESPIRATION RATE: 16 BRPM | HEART RATE: 58 BPM | DIASTOLIC BLOOD PRESSURE: 68 MMHG

## 2025-03-25 DIAGNOSIS — Z98.890 S/P RIGHT INGUINAL HERNIA REPAIR: Primary | ICD-10-CM

## 2025-03-25 DIAGNOSIS — Z87.19 S/P RIGHT INGUINAL HERNIA REPAIR: Primary | ICD-10-CM

## 2025-03-25 NOTE — PROGRESS NOTES
Surgery Post-op Note  [facility] General Surgery  [address] [facility] [place] [address]  T: [phone]  F: [phone]    Subjective:     Jacques Sharp presents to the clinic after undergoing robot assisted inguinal hernia repair on 3/13/2025 with Dr. Langley. He has no pain. He is anxious to return to working on his projects, raking leaves and fixing his cars. He states that he had no pain. He did have a bulge before surgery, and this is gone. He does not have any scrotal swelling or bruising. His incisions are healing well and he has no issues with them. He states that he is able to urinate, pass flatus and have BM without issues.         Objective:     Vitals:   /68 (BP Location: Right arm, Patient Position: Chair, Cuff Size: Adult Regular)   Pulse 58   Resp 16    General Appearance:    Jacques is a well-appearing thin 69 year old  male who is in no acute distress.   Cardiac:    Skin is warm and dry     Pulmonary:   Regular rate, rhythm   Abdomen:    Soft, nontender, nondistended. Incisions are well healed. No palpable hernia.    Incision: The incision site is healing  well. There are no signs of cellulitis or drainage.        Labs/Imaging:     None       Pathology:     None     Assessment:     Mr.Ross ARNOLD Sharp is status post robot assisted inguinal hernia repair, doing well .        Plan:     1. The patient is instructed to call the office if there is any increasing incisional pain, swelling, redness, drainage, or any other problems.   2. Continue with lifting restrictions for 4-6 weeks. At 4 weeks, slowly increase activity and lifting as tolerated.   3. If any return of symptoms, bulge, pain, issues or concerns, return to clinic as needed  4. All questions answered. Patient understands and agrees with plan.

## 2025-03-25 NOTE — NURSING NOTE
"Initial /68 (BP Location: Right arm, Patient Position: Chair, Cuff Size: Adult Regular)   Pulse 58   Resp 16  Estimated body mass index is 26.37 kg/m  as calculated from the following:    Height as of 2/19/25: 1.638 m (5' 4.49\").    Weight as of 2/19/25: 70.8 kg (156 lb). .  Patient is here for a post op.  Offers no complaints. valentin goss LPN    "

## 2025-03-25 NOTE — LETTER
3/25/2025      Jacques Sharp  [address] [place] [address]      Dear Colleague,    Thank you for referring your patient, Jacques Sharp, to the [facility]. Please see a copy of my visit note below.    Surgery Post-op Note  [facility] Surgery  [address] [place] [address]  T: [phone]  F: [phone]    Subjective:     Jacques Sharp presents to the clinic after undergoing robot assisted inguinal hernia repair on 3/13/2025 with Dr. Langley. He has no pain. He is anxious to return to working on his projects, raking leaves and fixing his cars. He states that he had no pain. He did have a bulge before surgery, and this is gone. He does not have any scrotal swelling or bruising. His incisions are healing well and he has no issues with them. He states that he is able to urinate, pass flatus and have BM without issues.         Objective:     Vitals:   /68 (BP Location: Right arm, Patient Position: Chair, Cuff Size: Adult Regular)   Pulse 58   Resp 16    General Appearance:    Jacques is a well-appearing thin 69 year old  male who is in no acute distress.   Cardiac:    Skin is warm and dry     Pulmonary:   Regular rate, rhythm   Abdomen:    Soft, nontender, nondistended. Incisions are well healed. No palpable hernia.    Incision: The incision site is healing  well. There are no signs of cellulitis or drainage.        Labs/Imaging:     None       Pathology:     None     Assessment:     Mr.Ross ARNOLD Sharp is status post robot assisted inguinal hernia repair, doing well .        Plan:     1. The patient is instructed to call the office if there is any increasing incisional pain, swelling, redness, drainage, or any other problems.   2. Continue with lifting restrictions for 4-6 weeks. At 4 weeks, slowly increase activity and lifting as tolerated.   3. If any return of symptoms, bulge, pain, issues or concerns, return to clinic as needed  4. All questions answered.  Patient understands and agrees with plan.        Again, thank you for allowing me to participate in the care of your patient.        Sincerely,        MIKE MARQUEZ PA-C    Electronically signed

## (undated) DEVICE — PACK HEART LEFT CUSTOM

## (undated) DEVICE — KIT HAND CONTROL ACIST 014644 AR-P54

## (undated) DEVICE — DAVINCI HOT SHEARS TIP COVER  400180

## (undated) DEVICE — SU WND CLOSURE VLOC 180 ABS 3-0 6" V-20 VLOCL0604

## (undated) DEVICE — GOWN XLG DISP 9545

## (undated) DEVICE — SYR 30ML SLIP TIP W/O NDL 302833

## (undated) DEVICE — GUIDEWIRE ASAHI SION BLUE 0.014"X180CM J-TIP AHW14R004J

## (undated) DEVICE — MANIFOLD KIT ANGIO AUTOMATED 014613

## (undated) DEVICE — DAVINCI XI DRAPE ARM 470015

## (undated) DEVICE — KIT ACCESSORY INTRO INFLATION SYS 20/30 PRIORITY 1000186-115

## (undated) DEVICE — CATH BALLOON EMERGE 3.0X12MM H7493918912300

## (undated) DEVICE — KIT PATIENT POSITIONING PIGAZZI LATEX FREE 40580

## (undated) DEVICE — CATH GUIDING BLUE YELLOW PTFE JR4 6FRX100CM 67008200

## (undated) DEVICE — DRAPE U SPLIT 74X120" 29440

## (undated) DEVICE — DRSG KERLIX 4 1/2"X4YDS ROLL 6730

## (undated) DEVICE — VALVE HEMOSTASIS .096" COPILOT MECH 1003331

## (undated) DEVICE — GLOVE BIOGEL PI MICRO SZ 5.5 48555

## (undated) DEVICE — CATH EMBOLECTOMY ART 2LUM ASP 6FRX145CM 30ML 60090-01

## (undated) DEVICE — ENDO SNARE EXACTO COLD 9MM LOOP 2.4MMX230CM 00711115

## (undated) DEVICE — CLOSURE ANGIOSEAL 6FR 610130

## (undated) DEVICE — DAVINCI XI DRIVER NDL MEGA SUTURECUT 8MM EXT 471309

## (undated) DEVICE — FILTER LAPROSHIELD SMOKE EVAC LSF1

## (undated) DEVICE — DAVINCI XI OBTURATOR BLADELESS 8MM 470359

## (undated) DEVICE — CATH ANGIO SUPERTORQUE PLUS JL4 6FRX100CM 533620

## (undated) DEVICE — SU MONOCRYL 4-0 PS-2 18" UND Y496G

## (undated) DEVICE — ENDO TROCAR FIRST ENTRY KII FIOS Z-THRD 05X100MM CTF03

## (undated) DEVICE — PREP SKIN SCRUB TRAY 4461A

## (undated) DEVICE — CATH BALLOON EMERGE 2.0X12MM H7493918912200

## (undated) DEVICE — Device

## (undated) DEVICE — SU DERMABOND ADVANCED .7ML DNX12

## (undated) DEVICE — CATH GUIDING COR LNCHR OD6FR L100CM

## (undated) DEVICE — GOWN LG DISP 9515

## (undated) DEVICE — GLOVE BIOGEL PI MICRO INDICATOR UNDERGLOVE SZ 6.0 48960

## (undated) DEVICE — SOL WATER IRRIG 1000ML BOTTLE 2F7114

## (undated) DEVICE — DRAPE TIBURON GENERAL ENDOSCOPY 9458

## (undated) DEVICE — INTRO SHEATH 6FRX25CM PINNACLE RSS606

## (undated) DEVICE — DAVINCI XI MONOPOLAR SCISSORS HOT SHEARS 8MM 470179

## (undated) DEVICE — DAVINCI XI DRAPE COLUMN 470341

## (undated) DEVICE — SU VICRYL 2-0 SH 27" J317H

## (undated) DEVICE — ANTIFOG SOLUTION SEE SHARP 150M TROCAR SWABS 30978

## (undated) DEVICE — DAVINCI XI FCP CADIERE 8MM ENDOWRIST 471049

## (undated) DEVICE — DAVINCI XI SEAL UNIVERSAL 5-12MM 470500

## (undated) DEVICE — PREP CHLORHEXIDINE 4% 4OZ (HIBICLENS) 57504

## (undated) DEVICE — CATH GUIDELINER 6FR 5571

## (undated) DEVICE — DECANTER VIAL 2006S

## (undated) DEVICE — CATH BALLOON EMERGE 2.5X12MM H7493918912250

## (undated) RX ORDER — GLYCOPYRROLATE 0.2 MG/ML
INJECTION, SOLUTION INTRAMUSCULAR; INTRAVENOUS
Status: DISPENSED
Start: 2025-03-13

## (undated) RX ORDER — ACETAMINOPHEN 325 MG/1
TABLET ORAL
Status: DISPENSED
Start: 2025-03-13

## (undated) RX ORDER — FENTANYL CITRATE 50 UG/ML
INJECTION, SOLUTION INTRAMUSCULAR; INTRAVENOUS
Status: DISPENSED
Start: 2019-09-17

## (undated) RX ORDER — DEXAMETHASONE SODIUM PHOSPHATE 10 MG/ML
INJECTION, SOLUTION INTRAMUSCULAR; INTRAVENOUS
Status: DISPENSED
Start: 2025-03-13

## (undated) RX ORDER — FENTANYL CITRATE 50 UG/ML
INJECTION, SOLUTION INTRAMUSCULAR; INTRAVENOUS
Status: DISPENSED
Start: 2025-03-13

## (undated) RX ORDER — KETOROLAC TROMETHAMINE 30 MG/ML
INJECTION, SOLUTION INTRAMUSCULAR; INTRAVENOUS
Status: DISPENSED
Start: 2025-03-13

## (undated) RX ORDER — GABAPENTIN 600 MG/1
TABLET ORAL
Status: DISPENSED
Start: 2025-03-13

## (undated) RX ORDER — FENTANYL CITRATE-0.9 % NACL/PF 10 MCG/ML
PLASTIC BAG, INJECTION (ML) INTRAVENOUS
Status: DISPENSED
Start: 2025-03-13

## (undated) RX ORDER — GABAPENTIN 300 MG/1
CAPSULE ORAL
Status: DISPENSED
Start: 2025-03-13

## (undated) RX ORDER — LIDOCAINE HYDROCHLORIDE 20 MG/ML
JELLY TOPICAL
Status: DISPENSED
Start: 2025-03-13

## (undated) RX ORDER — CELECOXIB 200 MG/1
CAPSULE ORAL
Status: DISPENSED
Start: 2025-03-13

## (undated) RX ORDER — BUPIVACAINE HYDROCHLORIDE AND EPINEPHRINE 2.5; 5 MG/ML; UG/ML
INJECTION, SOLUTION EPIDURAL; INFILTRATION; INTRACAUDAL; PERINEURAL
Status: DISPENSED
Start: 2025-03-13

## (undated) RX ORDER — ONDANSETRON 2 MG/ML
INJECTION INTRAMUSCULAR; INTRAVENOUS
Status: DISPENSED
Start: 2025-03-13

## (undated) RX ORDER — HEPARIN SODIUM 5000 [USP'U]/.5ML
INJECTION, SOLUTION INTRAVENOUS; SUBCUTANEOUS
Status: DISPENSED
Start: 2025-03-13

## (undated) RX ORDER — PROPOFOL 10 MG/ML
INJECTION, EMULSION INTRAVENOUS
Status: DISPENSED
Start: 2025-03-13